# Patient Record
Sex: FEMALE | Race: WHITE | NOT HISPANIC OR LATINO | Employment: OTHER | URBAN - METROPOLITAN AREA
[De-identification: names, ages, dates, MRNs, and addresses within clinical notes are randomized per-mention and may not be internally consistent; named-entity substitution may affect disease eponyms.]

---

## 2017-01-20 ENCOUNTER — TELEPHONE (OUTPATIENT)
Dept: RHEUMATOLOGY | Facility: CLINIC | Age: 82
End: 2017-01-20

## 2017-03-13 ENCOUNTER — TELEPHONE (OUTPATIENT)
Dept: PULMONOLOGY | Facility: CLINIC | Age: 82
End: 2017-03-13

## 2017-03-13 NOTE — TELEPHONE ENCOUNTER
----- Message from Kourtney Ching sent at 3/10/2017 10:17 AM CST -----  Contact: pt  Pt is requesting to speak to the nurse regarding an order that she needs for cpap supplies. Pls call pt back at [phone].

## 2017-03-14 ENCOUNTER — OFFICE VISIT (OUTPATIENT)
Dept: PULMONOLOGY | Facility: CLINIC | Age: 82
End: 2017-03-14
Payer: MEDICARE

## 2017-03-14 VITALS
WEIGHT: 137.13 LBS | HEIGHT: 58 IN | OXYGEN SATURATION: 98 % | HEART RATE: 90 BPM | DIASTOLIC BLOOD PRESSURE: 58 MMHG | SYSTOLIC BLOOD PRESSURE: 150 MMHG | BODY MASS INDEX: 28.78 KG/M2 | RESPIRATION RATE: 18 BRPM

## 2017-03-14 DIAGNOSIS — G47.33 OSA (OBSTRUCTIVE SLEEP APNEA): Primary | ICD-10-CM

## 2017-03-14 PROCEDURE — 99999 PR PBB SHADOW E&M-EST. PATIENT-LVL III: CPT | Mod: PBBFAC,,, | Performed by: NURSE PRACTITIONER

## 2017-03-14 PROCEDURE — 1160F RVW MEDS BY RX/DR IN RCRD: CPT | Mod: S$GLB,,, | Performed by: NURSE PRACTITIONER

## 2017-03-14 PROCEDURE — 99213 OFFICE O/P EST LOW 20 MIN: CPT | Mod: S$GLB,,, | Performed by: NURSE PRACTITIONER

## 2017-03-14 PROCEDURE — 1157F ADVNC CARE PLAN IN RCRD: CPT | Mod: S$GLB,,, | Performed by: NURSE PRACTITIONER

## 2017-03-14 PROCEDURE — 1159F MED LIST DOCD IN RCRD: CPT | Mod: S$GLB,,, | Performed by: NURSE PRACTITIONER

## 2017-03-14 NOTE — Clinical Note
Qualify for new machine. Please verify she is on 10. That is what i am ordering. New supply order too

## 2017-03-14 NOTE — PROGRESS NOTES
"Subjective:      Patient ID: Samantha Balderrama is a 89 y.o. female.    Chief Complaint: Sleep Apnea    HPI Comments: Patient presents to the office today for evaluation of sleep apnea.  Last visit 2015.  She states she is still doing well with CPAP pressure.  She wears nightly.  She states she cannot sleep without her CPAP.  She needs new supplies.  She may also qualify for new CPAP machine.  She benefits from CPAP.      BP (!) 150/58  Pulse 90  Resp 18  Ht 4' 10" (1.473 m)  Wt 62.2 kg (137 lb 2 oz)  SpO2 98%  BMI 28.66 kg/m2  Body mass index is 28.66 kg/(m^2).    Review of Systems   Constitutional: Negative.    HENT: Negative.    Respiratory: Negative.    Cardiovascular: Negative.    Musculoskeletal: Negative.    Gastrointestinal: Negative.    Neurological: Negative.    Psychiatric/Behavioral: Negative.      Objective:      Physical Exam   Constitutional: She is oriented to person, place, and time. She appears well-developed and well-nourished.   HENT:   Head: Normocephalic and atraumatic.   Neck: Normal range of motion. Neck supple.   Cardiovascular: Normal rate and regular rhythm.  Exam reveals no gallop.    No murmur heard.  Pulmonary/Chest: Effort normal and breath sounds normal.   Abdominal: Soft. She exhibits no mass.   Musculoskeletal: Normal range of motion. She exhibits no edema.   Neurological: She is alert and oriented to person, place, and time.   Skin: Skin is warm and dry.   Psychiatric: She has a normal mood and affect.       Assessment:       1. BASILIO (obstructive sleep apnea)        Outpatient Encounter Prescriptions as of 3/14/2017   Medication Sig Dispense Refill    cholecalciferol, vitamin D3, 50,000 unit capsule Take 1 capsule (50,000 Units total) by mouth twice a week. 24 capsule 4    fluticasone (FLONASE) 50 mcg/actuation nasal spray 1 spray by Each Nare route once daily. 1 Bottle 11    FLUZONE HIGH-DOSE 2016-17, PF, 180 mcg/0.5 mL Syrg TO BE ADMINISTERED BY IMMUNIZING PHARMACIST  0 "    montelukast (SINGULAIR) 10 mg tablet Take 1 tablet (10 mg total) by mouth every evening. 30 tablet 6    neomycin-polymyxin-dexamethasone (MAXITROL) 3.5mg/mL-10,000 unit/mL-0.1 % DrpS Use 1 drop in left eye (put in eye and on skin of lid) 4 x a day for 10 days 5 mL 0    salt moisturizing solution no1 (OCEAN ULTRA SALINE MIST) Mist 2 Pump by Nasal route 2 (two) times daily as needed. 90 mL 0    tamoxifen (NOLVADEX) 20 MG Tab Take 1 tablet (20 mg total) by mouth once daily. 90 tablet 3    VIT A/VIT C/VIT E/ZINC/COPPER (PRESERVISION AREDS ORAL) Take by mouth.      [DISCONTINUED] tamoxifen (NOLVADEX) 20 MG Tab TAKE 1 TABLET EVERY DAY 90 tablet 3     Facility-Administered Encounter Medications as of 3/14/2017   Medication Dose Route Frequency Provider Last Rate Last Dose    sodium chloride 0.9% flush 10 mL  10 mL Intravenous PRN Sudeep Beckwith MD   10 mL at 07/23/14 0910    sodium chloride 0.9% flush 10 mL  10 mL Intravenous PRN Sudeep Beckwith MD   10 mL at 07/22/15 1355     Orders Placed This Encounter   Procedures    CPAP/BIPAP SUPPLIES     Order Specific Question:   Type of mask:     Answer:   Nasal     Comments:   or FFM     Order Specific Question:   Headgear?     Answer:   Yes     Order Specific Question:   Tubing?     Answer:   Yes     Order Specific Question:   Humidifier chamber?     Answer:   Yes     Order Specific Question:   Chin strap?     Answer:   Yes     Order Specific Question:   Filters?     Answer:   Yes     Order Specific Question:   Length of need (1-99 months):     Answer:   99    CPAP FOR HOME USE     Order Specific Question:   Type:     Answer:   CPAP     Order Specific Question:   CPAP setting (cmH20):     Answer:   10     Order Specific Question:   Length of need (1-99 months):     Answer:   99     Order Specific Question:   Humidification:     Answer:   Heated     Order Specific Question:   Type of mask:     Answer:   FFM     Comments:   or nasal     Order Specific  Question:   Headgear?     Answer:   Yes     Order Specific Question:   Tubing?     Answer:   Yes     Order Specific Question:   Humidifier chamber?     Answer:   Yes     Order Specific Question:   Chin strap?     Answer:   Yes     Order Specific Question:   Filters?     Answer:   Yes     Plan:   Patient states CPAP over 5 years old.  Order for new CPAP follow-up in 2 months to review compliance and symptoms or call earlier if any problems.  CPAP supply order.

## 2017-05-08 ENCOUNTER — LAB VISIT (OUTPATIENT)
Dept: LAB | Facility: HOSPITAL | Age: 82
End: 2017-05-08
Attending: INTERNAL MEDICINE
Payer: MEDICARE

## 2017-05-08 DIAGNOSIS — Z85.89 HISTORY OF SECONDARY LUNG CANCER: ICD-10-CM

## 2017-05-08 DIAGNOSIS — I70.0 ATHEROSCLEROSIS OF AORTA: ICD-10-CM

## 2017-05-08 DIAGNOSIS — Z13.220 LIPID SCREENING: ICD-10-CM

## 2017-05-08 DIAGNOSIS — Z13.29 THYROID DISORDER SCREENING: ICD-10-CM

## 2017-05-08 DIAGNOSIS — M81.0 OSTEOPOROSIS: ICD-10-CM

## 2017-05-08 DIAGNOSIS — E55.9 HYPOVITAMINOSIS D: Chronic | ICD-10-CM

## 2017-05-08 DIAGNOSIS — Z85.3 HISTORY OF MALIGNANT NEOPLASM OF BOTH BREASTS: ICD-10-CM

## 2017-05-08 LAB
25(OH)D3+25(OH)D2 SERPL-MCNC: 50 NG/ML
ALBUMIN SERPL BCP-MCNC: 3.5 G/DL
ALP SERPL-CCNC: 40 U/L
ALT SERPL W/O P-5'-P-CCNC: 16 U/L
ANION GAP SERPL CALC-SCNC: 8 MMOL/L
AST SERPL-CCNC: 19 U/L
BASOPHILS # BLD AUTO: 0.04 K/UL
BASOPHILS NFR BLD: 0.7 %
BILIRUB SERPL-MCNC: 0.6 MG/DL
BUN SERPL-MCNC: 13 MG/DL
CALCIUM SERPL-MCNC: 9.3 MG/DL
CHLORIDE SERPL-SCNC: 104 MMOL/L
CHOLEST/HDLC SERPL: 3.4 {RATIO}
CO2 SERPL-SCNC: 27 MMOL/L
CREAT SERPL-MCNC: 0.7 MG/DL
DIFFERENTIAL METHOD: ABNORMAL
EOSINOPHIL # BLD AUTO: 0.1 K/UL
EOSINOPHIL NFR BLD: 2.1 %
ERYTHROCYTE [DISTWIDTH] IN BLOOD BY AUTOMATED COUNT: 14.7 %
EST. GFR  (AFRICAN AMERICAN): >60 ML/MIN/1.73 M^2
EST. GFR  (NON AFRICAN AMERICAN): >60 ML/MIN/1.73 M^2
GLUCOSE SERPL-MCNC: 115 MG/DL
HCT VFR BLD AUTO: 39.3 %
HDL/CHOLESTEROL RATIO: 29.3 %
HDLC SERPL-MCNC: 188 MG/DL
HDLC SERPL-MCNC: 55 MG/DL
HGB BLD-MCNC: 12.5 G/DL
LDLC SERPL CALC-MCNC: 109.4 MG/DL
LYMPHOCYTES # BLD AUTO: 1.7 K/UL
LYMPHOCYTES NFR BLD: 28.2 %
MCH RBC QN AUTO: 27.9 PG
MCHC RBC AUTO-ENTMCNC: 31.8 %
MCV RBC AUTO: 88 FL
MONOCYTES # BLD AUTO: 0.6 K/UL
MONOCYTES NFR BLD: 9.2 %
NEUTROPHILS # BLD AUTO: 3.6 K/UL
NEUTROPHILS NFR BLD: 59.6 %
NONHDLC SERPL-MCNC: 133 MG/DL
PLATELET # BLD AUTO: 155 K/UL
PMV BLD AUTO: 10.2 FL
POTASSIUM SERPL-SCNC: 4 MMOL/L
PROT SERPL-MCNC: 7.2 G/DL
RBC # BLD AUTO: 4.48 M/UL
SODIUM SERPL-SCNC: 139 MMOL/L
TRIGL SERPL-MCNC: 118 MG/DL
TSH SERPL DL<=0.005 MIU/L-ACNC: 2.32 UIU/ML
WBC # BLD AUTO: 6.06 K/UL

## 2017-05-08 PROCEDURE — 85025 COMPLETE CBC W/AUTO DIFF WBC: CPT

## 2017-05-08 PROCEDURE — 80053 COMPREHEN METABOLIC PANEL: CPT

## 2017-05-08 PROCEDURE — 80061 LIPID PANEL: CPT

## 2017-05-08 PROCEDURE — 84443 ASSAY THYROID STIM HORMONE: CPT

## 2017-05-08 PROCEDURE — 82306 VITAMIN D 25 HYDROXY: CPT

## 2017-05-08 PROCEDURE — 36415 COLL VENOUS BLD VENIPUNCTURE: CPT | Mod: PO

## 2017-05-15 ENCOUNTER — TELEPHONE (OUTPATIENT)
Dept: PULMONOLOGY | Facility: CLINIC | Age: 82
End: 2017-05-15

## 2017-05-16 ENCOUNTER — OFFICE VISIT (OUTPATIENT)
Dept: HEMATOLOGY/ONCOLOGY | Facility: CLINIC | Age: 82
End: 2017-05-16
Payer: MEDICARE

## 2017-05-16 ENCOUNTER — OFFICE VISIT (OUTPATIENT)
Dept: SLEEP MEDICINE | Facility: CLINIC | Age: 82
End: 2017-05-16
Payer: MEDICARE

## 2017-05-16 ENCOUNTER — OFFICE VISIT (OUTPATIENT)
Dept: RHEUMATOLOGY | Facility: CLINIC | Age: 82
End: 2017-05-16
Payer: MEDICARE

## 2017-05-16 ENCOUNTER — HOSPITAL ENCOUNTER (OUTPATIENT)
Dept: RADIOLOGY | Facility: HOSPITAL | Age: 82
Discharge: HOME | End: 2017-05-16
Attending: NURSE PRACTITIONER
Payer: MEDICARE

## 2017-05-16 VITALS
DIASTOLIC BLOOD PRESSURE: 70 MMHG | RESPIRATION RATE: 18 BRPM | HEIGHT: 58 IN | WEIGHT: 137.38 LBS | TEMPERATURE: 97 F | SYSTOLIC BLOOD PRESSURE: 120 MMHG | HEART RATE: 83 BPM | OXYGEN SATURATION: 99 % | BODY MASS INDEX: 28.84 KG/M2

## 2017-05-16 VITALS
WEIGHT: 139.75 LBS | SYSTOLIC BLOOD PRESSURE: 91 MMHG | HEART RATE: 83 BPM | BODY MASS INDEX: 29.21 KG/M2 | DIASTOLIC BLOOD PRESSURE: 66 MMHG

## 2017-05-16 VITALS
HEIGHT: 58 IN | WEIGHT: 139.75 LBS | SYSTOLIC BLOOD PRESSURE: 104 MMHG | DIASTOLIC BLOOD PRESSURE: 60 MMHG | HEART RATE: 89 BPM | RESPIRATION RATE: 18 BRPM | OXYGEN SATURATION: 97 % | BODY MASS INDEX: 29.33 KG/M2

## 2017-05-16 DIAGNOSIS — M81.0 SENILE OSTEOPOROSIS: ICD-10-CM

## 2017-05-16 DIAGNOSIS — Z85.89 HISTORY OF SECONDARY LUNG CANCER: Primary | ICD-10-CM

## 2017-05-16 DIAGNOSIS — Z85.3 HISTORY OF MALIGNANT NEOPLASM OF BOTH BREASTS: ICD-10-CM

## 2017-05-16 DIAGNOSIS — E55.9 HYPOVITAMINOSIS D: Chronic | ICD-10-CM

## 2017-05-16 DIAGNOSIS — M15.9 PRIMARY OSTEOARTHRITIS INVOLVING MULTIPLE JOINTS: Primary | ICD-10-CM

## 2017-05-16 DIAGNOSIS — Z85.89 HISTORY OF SECONDARY LUNG CANCER: ICD-10-CM

## 2017-05-16 DIAGNOSIS — G47.33 OSA (OBSTRUCTIVE SLEEP APNEA): Primary | ICD-10-CM

## 2017-05-16 PROCEDURE — 1157F ADVNC CARE PLAN IN RCRD: CPT | Mod: S$GLB,,, | Performed by: NURSE PRACTITIONER

## 2017-05-16 PROCEDURE — 99999 PR PBB SHADOW E&M-EST. PATIENT-LVL III: CPT | Mod: PBBFAC,,, | Performed by: NURSE PRACTITIONER

## 2017-05-16 PROCEDURE — 71020 XR CHEST PA AND LATERAL: CPT | Mod: 26,,, | Performed by: RADIOLOGY

## 2017-05-16 PROCEDURE — 1159F MED LIST DOCD IN RCRD: CPT | Mod: S$GLB,,, | Performed by: NURSE PRACTITIONER

## 2017-05-16 PROCEDURE — 1125F AMNT PAIN NOTED PAIN PRSNT: CPT | Mod: S$GLB,,, | Performed by: PHYSICIAN ASSISTANT

## 2017-05-16 PROCEDURE — 1160F RVW MEDS BY RX/DR IN RCRD: CPT | Mod: S$GLB,,, | Performed by: PHYSICIAN ASSISTANT

## 2017-05-16 PROCEDURE — 99214 OFFICE O/P EST MOD 30 MIN: CPT | Mod: S$GLB,,, | Performed by: PHYSICIAN ASSISTANT

## 2017-05-16 PROCEDURE — 99213 OFFICE O/P EST LOW 20 MIN: CPT | Mod: S$GLB,,, | Performed by: NURSE PRACTITIONER

## 2017-05-16 PROCEDURE — 99999 PR PBB SHADOW E&M-EST. PATIENT-LVL III: CPT | Mod: PBBFAC,,, | Performed by: PHYSICIAN ASSISTANT

## 2017-05-16 PROCEDURE — 71020 XR CHEST PA AND LATERAL: CPT | Mod: TC,PO

## 2017-05-16 PROCEDURE — 1160F RVW MEDS BY RX/DR IN RCRD: CPT | Mod: S$GLB,,, | Performed by: NURSE PRACTITIONER

## 2017-05-16 PROCEDURE — 99499 UNLISTED E&M SERVICE: CPT | Mod: S$GLB,,, | Performed by: PHYSICIAN ASSISTANT

## 2017-05-16 PROCEDURE — 99999 PR PBB SHADOW E&M-EST. PATIENT-LVL IV: CPT | Mod: PBBFAC,,, | Performed by: NURSE PRACTITIONER

## 2017-05-16 PROCEDURE — 1159F MED LIST DOCD IN RCRD: CPT | Mod: S$GLB,,, | Performed by: PHYSICIAN ASSISTANT

## 2017-05-16 PROCEDURE — 99499 UNLISTED E&M SERVICE: CPT | Mod: S$GLB,,, | Performed by: NURSE PRACTITIONER

## 2017-05-16 PROCEDURE — 1126F AMNT PAIN NOTED NONE PRSNT: CPT | Mod: S$GLB,,, | Performed by: NURSE PRACTITIONER

## 2017-05-16 NOTE — PROGRESS NOTES
Subjective:       Patient ID: Samantha Balderrama is a 89 y.o. female.    Chief Complaint: Osteoarthritis and Osteoporosis    HPI Comments: Ms Samantha Lauren is a sweet 88 y/o laldy here for routine follow up today. She is seen for osteoporosis and osteoarthritis.  She has been on Reclast and has had a total of 6 treatments (last tx 7/2015).  Last July 2016 she was put on holiday after her dexa was reveiwed showing stable BMD.  Total hip -1.6, neck -2.5, spine -2.6.  Reclast Holiday was recommended and next dexa to be done in 1-2 years.  Patient has a history of Vit D deficiency and was on 50,000units twice weekly.  Her levels have been normal so we decreased to once weekly vit D.  No recent falls or fractures.  She is very active.      She also has osteoarthritis mainly in her hands with degenerative changes at her pip and dip joints.  She says they don't hurt her as much as they just dont work like they used to.  She does not take anything for her arthritis.  No complaints with knee pain or shoulder pain.  She has a h/o breast ca and is still on tamoxifen.  Overall she is doing very well and has no issues today.    Osteoarthritis   Associated symptoms include arthralgias. Pertinent negatives include no abdominal pain, chest pain, chills, coughing, fatigue, fever, headaches, joint swelling, myalgias, nausea, numbness, rash, sore throat, vomiting or weakness.     Review of Systems   Constitutional: Negative for chills, fatigue and fever.   HENT: Negative for mouth sores, rhinorrhea and sore throat.    Eyes: Negative for pain and redness.   Respiratory: Negative for cough and shortness of breath.    Cardiovascular: Negative for chest pain.   Gastrointestinal: Negative for abdominal pain, constipation, diarrhea, nausea and vomiting.   Genitourinary: Negative for dysuria and hematuria.   Musculoskeletal: Positive for arthralgias. Negative for joint swelling and myalgias.   Skin: Negative for rash.   Neurological: Negative  for weakness, numbness and headaches.   Psychiatric/Behavioral: The patient is not nervous/anxious.          Objective:     BP 91/66  Pulse 83  Wt 63.4 kg (139 lb 12.4 oz)  BMI 29.21 kg/m2     Physical Exam   Constitutional: She is oriented to person, place, and time and well-developed, well-nourished, and in no distress.   HENT:   Head: Normocephalic and atraumatic.   Eyes: Pupils are equal, round, and reactive to light. Right eye exhibits no discharge.   Neck: Normal range of motion.   Cardiovascular: Normal rate, regular rhythm and normal heart sounds.  Exam reveals no friction rub.    Pulmonary/Chest: Effort normal and breath sounds normal. No respiratory distress.   Abdominal: Soft. She exhibits no distension. There is no tenderness.   Lymphadenopathy:     She has no cervical adenopathy.   Neurological: She is alert and oriented to person, place, and time.   Skin: No rash noted. No erythema.     Psychiatric: Mood normal.   Musculoskeletal: Normal range of motion. She exhibits no edema or deformity.   Significant degenerative changes to naseem hands pip and dip joints, heberdens and bouchards nodes, no acute inflammation or synovitis notes  Naseem knees with good rom, no effusions, minimal crepitus         Recent Results (from the past 504 hour(s))   Vitamin D    Collection Time: 05/08/17 11:18 AM   Result Value Ref Range    Vit D, 25-Hydroxy 50 30 - 96 ng/mL   TSH    Collection Time: 05/08/17 11:18 AM   Result Value Ref Range    TSH 2.317 0.400 - 4.000 uIU/mL   CBC auto differential    Collection Time: 05/08/17 11:18 AM   Result Value Ref Range    WBC 6.06 3.90 - 12.70 K/uL    RBC 4.48 4.00 - 5.40 M/uL    Hemoglobin 12.5 12.0 - 16.0 g/dL    Hematocrit 39.3 37.0 - 48.5 %    MCV 88 82 - 98 fL    MCH 27.9 27.0 - 31.0 pg    MCHC 31.8 (L) 32.0 - 36.0 %    RDW 14.7 (H) 11.5 - 14.5 %    Platelets 155 150 - 350 K/uL    MPV 10.2 9.2 - 12.9 fL    Gran # 3.6 1.8 - 7.7 K/uL    Lymph # 1.7 1.0 - 4.8 K/uL    Mono # 0.6 0.3 -  1.0 K/uL    Eos # 0.1 0.0 - 0.5 K/uL    Baso # 0.04 0.00 - 0.20 K/uL    Gran% 59.6 38.0 - 73.0 %    Lymph% 28.2 18.0 - 48.0 %    Mono% 9.2 4.0 - 15.0 %    Eosinophil% 2.1 0.0 - 8.0 %    Basophil% 0.7 0.0 - 1.9 %    Differential Method Automated    CMP    Collection Time: 05/08/17 11:18 AM   Result Value Ref Range    Sodium 139 136 - 145 mmol/L    Potassium 4.0 3.5 - 5.1 mmol/L    Chloride 104 95 - 110 mmol/L    CO2 27 23 - 29 mmol/L    Glucose 115 (H) 70 - 110 mg/dL    BUN, Bld 13 8 - 23 mg/dL    Creatinine 0.7 0.5 - 1.4 mg/dL    Calcium 9.3 8.7 - 10.5 mg/dL    Total Protein 7.2 6.0 - 8.4 g/dL    Albumin 3.5 3.5 - 5.2 g/dL    Total Bilirubin 0.6 0.1 - 1.0 mg/dL    Alkaline Phosphatase 40 (L) 55 - 135 U/L    AST 19 10 - 40 U/L    ALT 16 10 - 44 U/L    Anion Gap 8 8 - 16 mmol/L    eGFR if African American >60.0 >60 mL/min/1.73 m^2    eGFR if non African American >60.0 >60 mL/min/1.73 m^2   Lipid panel    Collection Time: 05/08/17 11:18 AM   Result Value Ref Range    Cholesterol 188 120 - 199 mg/dL    Triglycerides 118 30 - 150 mg/dL    HDL 55 40 - 75 mg/dL    LDL Cholesterol 109.4 63.0 - 159.0 mg/dL    HDL/Chol Ratio 29.3 20.0 - 50.0 %    Total Cholesterol/HDL Ratio 3.4 2.0 - 5.0    Non-HDL Cholesterol 133 mg/dL       DEXA 7/2016: osteoporosis stable bmd, Total hip -1.6, femur neck -2.5, spine -2.6     Assessment:       1. Primary osteoarthritis involving multiple joints    2. Senile osteoporosis    3. Hypovitaminosis D        1.  OA garett hands with notable OA changes, no significant pain, not taking any medication for this  2.  Osteoporosis--on reclast holiday after 6 treatments, last dexa 7/2016 osteoporosis stable bmd, Total hip -1.6, femur neck -2.5, spine -2.6  3. Vit D deficiency: now normalized, taking 50,000units Vit d weekly      Plan:       Continue reclast holiday, next dexa July 2018  cont Vit D once weekly 50,000units, calcium in diet  Call us if any flares of pain, with any questions or concers  rec  tumeric 1000mg/day     Will follow her once yearly, bring back when time for next bone density scan but sooner if needed

## 2017-05-16 NOTE — PROGRESS NOTES
ONCOLOGIST: JACQUELINE Toney MD    CC: Breast cancer followup.    HPI: 89-year-old female with a history of bilateral breast cancer with metastatic disease to the lung status post lung resection, bilateral mastectomies, and radiation therapy/ chemo in 1985. She has been on long term Tamoxifen with no adverse side effects. She has been followed every 6 mons with clinical exams, labs and chest x-rays.     Pt has dips in her platelet counts intermittently dating back to Sept 2012. Saw Dr. Toney 9/25/15- no further recommendations- just follow cbc. Dr. Toney would like for her to remain on Tamoxifen indefinitely. Had gyn exam with Dr. Coronado on 10/7/15.     Pt comes in for 6 mon chest x-ray and chest wall exam. Lab review.     Answers for HPI/ROS submitted by the patient on 5/16/2017   appetite change : No  unexpected weight change: No  visual disturbance: Yes  cough: No  shortness of breath: No  chest pain: Yes  abdominal pain: No  diarrhea: No  frequency: No  back pain: No  rash: No  headaches: No  adenopathy: No  nervous/ anxious: No      ROS: Denies sob, chest pain, weakness or fatigue, dizziness, nausea, wt loss or gain. No sore throat. No sinus congestion, dry cough or sneezing. No fever or night sweats. No itchy eyes. No lela aches or pains out of the ordinary. No change in bowel or bladder function or character. No chest wall lumps. No appetite changes. No vaginal discharge or bleeding. No lower extremity edema or pain. Has hearing aids and hears very well. No gingival bleeding, nose bleeds or unusual bruising. Not walking- admits needs to get back in to it. States feels good.  Balance of ROS is negative otherwise.     PAST MEDICAL HISTORY: Arthritis, obstructive sleep apnea, bilateral breast cancer with metastatic disease to lung s/p resection, osteoporosis, thrombocytopenia.     PAST SURGICAL HISTORY: Bilateral mastectomies. Lung resection.     FAMILY HISTORY: Sisters with hypertension, father and mother with  heart disease, sister with asthma, sister with breast cancer, aunt with colon cancer. Niece in mid 30's breast cancer. There is also a family history of osteoporosis.     SOCIAL HISTORY: She is single, never , no children. Does not smoke and reports alcohol usage of 1 to 2 per week, caffeine 4 to 5 per day. Denies any regular exercise program. 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 Appears younger than her stated age. Well-nourished and developed.  HEAD: Atraumatic and normocephalic. Pupils equal round reactive to light. Conjunctiva is non-icteric. Oral mucosa pink and moist with no posterior erythema. Nasal passages patent and pale/boggy in appearance.   NECK: Soft, supple, symmetric. No thyromegaly. No bruit garett carotids.   LYMPHATICS: There is no cervical, supraclavicular, infraclavicular or axillary adenopathy.  CV: Regular rate rhythm with no murmurs or gallops.  CHEST: Clear to auscultation. Unlabored respiratory effort.  CHEST WALL: Breasts are surgically absent. There is no visible rash, lump, erythema, dimpling, or architectural distortion over bilateral chest walls. No palpable abnormality appreciated over the chest walls.  ABDOMEN: Soft, nontender, no hepatosplenomegaly.  SKIN: Warm and dry to touch. Skin turgor within normal limits.    EXTREMITIES: There is no clubbing, cyanosis, or edema. No evidence of lymphedema in the upper extremities.  PSYCH: Affect is appropriate and pleasant.    CHEST X-ray today -results pending. Cbc wnl- chemistries - mild elevation in glucose- pt not fasting, lipids - wnl, tsh- wnl, vit d normal- dexa up to date.    Last colonoscopy 2/2005 - had polyp excised. Was recommended by Dr. Pierre to have 5 year follow-up. This was ordered and Gastro decided due to her age she would be ok not to have the scope.     ASSESSMENT:  1. Bilateral breast cancer with metastatic disease to the lung. Status post  bilateral mastectomies and lung resection. Status post chemotherapy and chest wall radiation and on long-term tamoxifen.  2. Sleep apnea. Using CPAP without difficulty  3. Health maintenance. Colonoscopy canceled by gastro - decided not needed  4. Hearing loss corrected with bilateral hearing aides  5. Thrombocytopenia intermittent and relatively stable- labs wnl today  6. CXR - pending    PLAN:  1. RTC 6 mons with PA and lateral chest x-ray, CBC, and chem 20  2. B12 sublinguinal daily.   3. Patient was asked to perform monthly chest wall examinations and was instructed on what to look for during the exam.  4. Patient encouraged to get back into her walking and she agrees.      Clarice Lombardi, RN, MSN, NP-C

## 2017-05-16 NOTE — PROGRESS NOTES
"Subjective:      Patient ID: Samantha Balderrama is a 89 y.o. female.    Chief Complaint: Sleep Apnea    HPI Comments: Patient presents to office for evaluation of CPAP. This is replacement. She is doing well with new machine. She is wearing her old machine when she is at her sister's house which is quite often.   She benefits from use and wears nightly.   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    Review of Systems   Constitutional: Negative.    HENT: Negative.    Respiratory: Negative.    Cardiovascular: Negative.    Musculoskeletal: Negative.    Gastrointestinal: Negative.    Neurological: Negative.    Psychiatric/Behavioral: Negative.      Objective:      Physical Exam   Constitutional: She is oriented to person, place, and time. She appears well-developed and well-nourished.   HENT:   Head: Normocephalic and atraumatic.   Mouth/Throat: Oropharynx is clear and moist.   Neck: Normal range of motion. Neck supple.   Cardiovascular: Normal rate and regular rhythm.  Exam reveals no gallop.    No murmur heard.  Pulmonary/Chest: Effort normal and breath sounds normal.   Abdominal: Soft.   Musculoskeletal: Normal range of motion. She exhibits no edema.   Neurological: She is alert and oriented to person, place, and time.   Skin: Skin is warm and dry.   Psychiatric: She has a normal mood and affect.     Personal Diagnostic Review  CPAP download shows patient wears on average 6 " hrs and 54 minutes. Greater than 4 hrs 60 % of the time. AHI 2.6  She also wears another CPAP          Assessment:       1. BASILIO (obstructive sleep apnea)        Outpatient Encounter Prescriptions as of 5/16/2017   Medication Sig Dispense Refill    cholecalciferol, vitamin D3, 50,000 unit capsule Take 1 capsule (50,000 Units total) by mouth twice a week. 24 capsule 4    montelukast (SINGULAIR) 10 mg tablet Take 1 tablet (10 mg total) by mouth every evening. 30 tablet 6    neomycin-polymyxin-dexamethasone (MAXITROL) 3.5mg/mL-10,000 unit/mL-0.1 % DrpS Use 1 drop in left eye (put in eye and on skin of lid) 4 x a day for 10 days 5 mL 0    salt moisturizing solution no1 (OCEAN ULTRA SALINE MIST) Mist 2 Pump by Nasal route 2 (two) times daily as needed. 90 mL 0    tamoxifen (NOLVADEX) 20 MG Tab Take 1 tablet (20 mg total) by mouth once daily. 90 tablet 3    VIT A/VIT C/VIT E/ZINC/COPPER (PRESERVISION AREDS ORAL) Take by mouth.      [DISCONTINUED] FLUZONE HIGH-DOSE 2016-17, PF, 180 mcg/0.5 mL Syrg TO BE ADMINISTERED BY IMMUNIZING PHARMACIST  0     Facility-Administered Encounter Medications as of 5/16/2017   Medication Dose Route Frequency Provider Last Rate Last Dose    sodium chloride 0.9% flush 10 mL  10 mL Intravenous PRN Sudeep Beckwith MD   10 mL at 07/23/14 0910    sodium chloride 0.9% flush 10 mL  10 mL Intravenous PRN Sudeep Beckwith MD   10 mL at 07/22/15 1355     No orders of the defined types were placed in this encounter.    Plan:      Discussed with patient to wear new machine nightly and followup before 90 day set up day to review compliance.

## 2017-05-16 NOTE — MR AVS SNAPSHOT
[facility] Rheumatology  [address] [place] [address]  Phone: [phone]  Fax: [phone]                  Samantha Balderrama   2017 10:00 AM   Office Visit    Description:  Female : 10/28/1927   Provider:  Danya Downey PA-C   Department[facility]           Reason for Visit     Osteoarthritis     Osteoporosis           Diagnoses this Visit        Comments    Primary osteoarthritis involving multiple joints    -  Primary     Senile osteoporosis                To Do List           Future Appointments        Provider Department Dept Phone    2017 11:00 AM Clarice Lombardi NP [facility] - Hemotology Oncology [phone]    2017 8:40 AM Elizabeth Lejeune, NP [facility] Sleep Clinic [phone]    2017 10:30 AM ANASTACIA Amaral MD [facility] Ophthalmology [phone]    3/14/2018 10:00 AM Elizabeth Lejeune, NP [facility] Sleep [facility] [phone]      Goals (5 Years of Data)     None      Follow-Up and Disposition     Return in about 1 year (around 2018) for dexa due july.      [facility]     [facility] Nurse Care Line -  Assistance  Unless otherwise directed by your provider, please contact [facility], our nurse care line that is available for  assistance.     Registered nurses in the [facility] provide: appointment scheduling, clinical advisement, health education, and other advisory services.  Call: [phone] (toll free)               Medications           Message regarding Medications     Verify the changes and/or additions to your medication regime listed below are the same as discussed with your clinician today.  If any of these changes or additions are incorrect, please notify your healthcare provider.             Verify that the below list of medications is an accurate representation of the medications you are currently taking.  If none reported, the list may be blank. If incorrect, please contact your healthcare provider.  Carry this list with you in case of emergency.           Current Medications     cholecalciferol, vitamin D3, 50,000 unit capsule Take 1 capsule (50,000 Units total) by mouth twice a week.    montelukast (SINGULAIR) 10 mg tablet Take 1 tablet (10 mg total) by mouth every evening.    neomycin-polymyxin-dexamethasone (MAXITROL) 3.5mg/mL-10,000 unit/mL-0.1 % DrpS Use 1 drop in left eye (put in eye and on skin of lid) 4 x a day for 10 days    salt moisturizing solution no1 (OCEAN ULTRA SALINE MIST) Mist 2 Pump by Nasal route 2 (two) times daily as needed.    tamoxifen (NOLVADEX) 20 MG Tab Take 1 tablet (20 mg total) by mouth once daily.    VIT A/VIT C/VIT E/ZINC/COPPER (PRESERVISION AREDS ORAL) Take by mouth.           Clinical Reference Information           Your Vitals Were     BP Pulse Weight BMI       91/66 83 63.4 kg (139 lb 12.4 oz) 29.21 kg/m2       Blood Pressure          Most Recent Value    BP  91/66      Allergies as of 5/16/2017     No Known Allergies      Immunizations Administered on Date of Encounter - 5/16/2017     None      Instructions    Tumeric (Curmcumin) 1000mg/day (in supplement area at the store)    Will repeat bone density next July, will just schedule you back then, call if needed sooner     Cont vit D 50,000U weekly        Language Assistance Services     ATTENTION: Language assistance services are available, free of charge. Please call [phone].      ATENCIÓN: Si habla judith, tiene a ascencio disposición servicios gratuitos de asistencia lingüística. Llame al [phone].     [facility] Ý: N?u b?n nói Ti?ng Vi?t, có các d?ch v? h? tr? ngôn ng? mi?n phí dành cho b?n. G?i s? [phone].         [facility] complies with applicable Federal civil rights laws and does not discriminate on the basis of race, color, national origin, age, disability, or sex.

## 2017-06-06 ENCOUNTER — OFFICE VISIT (OUTPATIENT)
Dept: SLEEP MEDICINE | Facility: CLINIC | Age: 82
End: 2017-06-06
Payer: MEDICARE

## 2017-06-06 VITALS
HEIGHT: 58 IN | OXYGEN SATURATION: 99 % | SYSTOLIC BLOOD PRESSURE: 100 MMHG | WEIGHT: 136 LBS | DIASTOLIC BLOOD PRESSURE: 64 MMHG | RESPIRATION RATE: 18 BRPM | HEART RATE: 74 BPM | BODY MASS INDEX: 28.55 KG/M2

## 2017-06-06 DIAGNOSIS — G47.33 OSA (OBSTRUCTIVE SLEEP APNEA): Primary | ICD-10-CM

## 2017-06-06 PROCEDURE — 1159F MED LIST DOCD IN RCRD: CPT | Mod: S$GLB,,, | Performed by: NURSE PRACTITIONER

## 2017-06-06 PROCEDURE — 99999 PR PBB SHADOW E&M-EST. PATIENT-LVL III: CPT | Mod: PBBFAC,,, | Performed by: NURSE PRACTITIONER

## 2017-06-06 PROCEDURE — 99213 OFFICE O/P EST LOW 20 MIN: CPT | Mod: S$GLB,,, | Performed by: NURSE PRACTITIONER

## 2017-06-06 NOTE — PROGRESS NOTES
"Subjective:      Patient ID: Samantha Balderrama is a 89 y.o. female.    Chief Complaint: Sleep Apnea    Presents to office for review of CPAP therapy. Recent replacement.Patient states improved symptoms with use of CPAP. Sleeping more soundly. Waking up feeling more refreshed. Improved daytime sleepiness. Patient states she is benefiting from use of the CPAP.   Patient Active Problem List:     Senile osteoporosis     Macular degeneration, age related, nonexudative     Serous detachment of retinal pigment epithelium     Pseudophakia     BASILIO on CPAP     Hypovitaminosis D     History of secondary lung cancer     History of malignant neoplasm of both breasts     Atherosclerosis of aorta     History of skin cancer     Obesity (BMI 30.0-34.9)     Primary osteoarthritis involving multiple joints              /64   Pulse 74   Resp 18   Ht 4' 10" (1.473 m)   Wt 61.7 kg (136 lb 0.4 oz)   SpO2 99%   BMI 28.43 kg/m²   Body mass index is 28.43 kg/m².    Review of Systems   Constitutional: Negative.    HENT: Negative.    Respiratory: Negative.    Cardiovascular: Negative.    Musculoskeletal: Negative.    Gastrointestinal: Negative.    Neurological: Negative.    Psychiatric/Behavioral: Negative.      Objective:      Physical Exam   Constitutional: She is oriented to person, place, and time. She appears well-developed and well-nourished.   HENT:   Head: Normocephalic and atraumatic.   Mouth/Throat: Oropharynx is clear and moist.   Neck: Normal range of motion. Neck supple.   Cardiovascular: Normal rate and regular rhythm.  Exam reveals no gallop.    No murmur heard.  Pulmonary/Chest: Effort normal and breath sounds normal.   Abdominal: Soft.   Musculoskeletal: Normal range of motion. She exhibits no edema.   Neurological: She is alert and oriented to person, place, and time.   Skin: Skin is warm and dry.   Psychiatric: She has a normal mood and affect.     Personal Diagnostic Review  CPAP download shows patient wears on " average 6 hrs and 46 minutes. Greater than 4 hrs 73.3 % of the time. AHI 3.6    Assessment:       1. BASILIO (obstructive sleep apnea)        Outpatient Encounter Prescriptions as of 6/6/2017   Medication Sig Dispense Refill    cholecalciferol, vitamin D3, 50,000 unit capsule Take 1 capsule (50,000 Units total) by mouth twice a week. 24 capsule 4    montelukast (SINGULAIR) 10 mg tablet Take 1 tablet (10 mg total) by mouth every evening. 30 tablet 6    neomycin-polymyxin-dexamethasone (MAXITROL) 3.5mg/mL-10,000 unit/mL-0.1 % DrpS Use 1 drop in left eye (put in eye and on skin of lid) 4 x a day for 10 days 5 mL 0    salt moisturizing solution no1 (OCEAN ULTRA SALINE MIST) Mist 2 Pump by Nasal route 2 (two) times daily as needed. 90 mL 0    tamoxifen (NOLVADEX) 20 MG Tab Take 1 tablet (20 mg total) by mouth once daily. 90 tablet 3    VIT A/VIT C/VIT E/ZINC/COPPER (PRESERVISION AREDS ORAL) Take by mouth.       Facility-Administered Encounter Medications as of 6/6/2017   Medication Dose Route Frequency Provider Last Rate Last Dose    sodium chloride 0.9% flush 10 mL  10 mL Intravenous PRN Sudeep Beckwith MD   10 mL at 07/23/14 0910    sodium chloride 0.9% flush 10 mL  10 mL Intravenous PRN Sudeep Beckwith MD   10 mL at 07/22/15 1355     No orders of the defined types were placed in this encounter.    Plan:      Doing well on PAP settings. Patient is compliant. Follow up in 12 months with PAP data download or call earlier if any problems.

## 2017-08-09 ENCOUNTER — OFFICE VISIT (OUTPATIENT)
Dept: INTERNAL MEDICINE | Facility: CLINIC | Age: 82
End: 2017-08-09
Payer: MEDICARE

## 2017-08-09 VITALS
HEART RATE: 88 BPM | OXYGEN SATURATION: 97 % | BODY MASS INDEX: 27.58 KG/M2 | SYSTOLIC BLOOD PRESSURE: 122 MMHG | HEIGHT: 58 IN | DIASTOLIC BLOOD PRESSURE: 70 MMHG | WEIGHT: 131.38 LBS

## 2017-08-09 DIAGNOSIS — Z85.89 HISTORY OF SECONDARY LUNG CANCER: ICD-10-CM

## 2017-08-09 DIAGNOSIS — M15.9 PRIMARY OSTEOARTHRITIS INVOLVING MULTIPLE JOINTS: ICD-10-CM

## 2017-08-09 DIAGNOSIS — Z85.3 HISTORY OF MALIGNANT NEOPLASM OF BOTH BREASTS: ICD-10-CM

## 2017-08-09 DIAGNOSIS — G47.33 OSA ON CPAP: Chronic | ICD-10-CM

## 2017-08-09 DIAGNOSIS — Z00.00 ENCOUNTER FOR PREVENTIVE HEALTH EXAMINATION: Primary | ICD-10-CM

## 2017-08-09 DIAGNOSIS — E55.9 HYPOVITAMINOSIS D: Chronic | ICD-10-CM

## 2017-08-09 DIAGNOSIS — M81.0 SENILE OSTEOPOROSIS: ICD-10-CM

## 2017-08-09 DIAGNOSIS — Z85.828 HISTORY OF SKIN CANCER: ICD-10-CM

## 2017-08-09 DIAGNOSIS — H35.3190 MACULAR DEGENERATION, AGE RELATED, NONEXUDATIVE: ICD-10-CM

## 2017-08-09 DIAGNOSIS — I70.0 ATHEROSCLEROSIS OF AORTA: ICD-10-CM

## 2017-08-09 PROCEDURE — 99999 PR PBB SHADOW E&M-EST. PATIENT-LVL IV: CPT | Mod: PBBFAC,,, | Performed by: PHYSICIAN ASSISTANT

## 2017-08-09 PROCEDURE — G0439 PPPS, SUBSEQ VISIT: HCPCS | Mod: S$GLB,,, | Performed by: PHYSICIAN ASSISTANT

## 2017-08-09 PROCEDURE — 99499 UNLISTED E&M SERVICE: CPT | Mod: S$GLB,,, | Performed by: PHYSICIAN ASSISTANT

## 2017-08-09 NOTE — PATIENT INSTRUCTIONS
Counseling and Referral of Other Preventative  (Italic type indicates deductible and co-insurance are waived)    Patient Name: Samantha Balderrama  Today's Date: 8/9/2017      SERVICE LIMITATIONS RECOMMENDATION    Vaccines    · Pneumococcal (once after 65)    · Influenza (annually)    · Hepatitis B (if medium/high risk)    · Prevnar 13      Hepatitis B medium/high risk factors:       - End-stage renal disease       - Hemophiliacs who received Factor VII or         IX concentrates       - Clients of institutions for the mentally             retarded       - Persons who live in the same house as          a HepB carrier       - Homosexual men       - Illicit injectable drug abusers     Pneumococcal: Report got at [facility]. Follow PCP     Influenza: Will get in the fall     Hepatitis B: N/A     Prevnar 13: Report done at [facility].  follow PCP    Mammogram (biennial age 50-74)  Annually (age 40 or over)  N/A    Pap (up to age 70 and after 70 if unknown history or abnormal study last 10 years)    N/A. Follow Dr. Coronado     The USPSTF recommends against screening for cervical cancer in women older than age 65 years who have had adequate prior screening and are not otherwise at high risk for cervical cancer.      Colorectal cancer screening (to age 75)    · Fecal occult blood test (annual)  · Flexible sigmoidoscopy (5y)  · Screening colonoscopy (10y)  · Barium enema   N/A. GI reports no longer necessary. Continue follow PCP.    Diabetes self-management training (no USPSTF recommendations)  Requires referral by treating physician for patient with diabetes or renal disease. 10 hours of initial DSMT sessions of no less than 30 minutes each in a continuous 12-month period. 2 hours of follow-up DSMT in subsequent years.  N/A    Bone mass measurements (age 65 & older, biennial)  Requires diagnosis related to osteoporosis or estrogen deficiency. Biennial benefit unless patient has history of long-term glucocorticoid  Last done 7/2016, recommend  to repeat every 2  years    Glaucoma screening (no USPSTF recommendation)  Diabetes mellitus, family history   , age 50 or over    American, age 65 or over  Done this year, repeat every year. Follow Dr. Amaral    Medical nutrition therapy for diabetes or renal disease (no recommended schedule)  Requires referral by treating physician for patient with diabetes or renal disease or kidney transplant within the past 3 years.  Can be provided in same year as diabetes self-management training (DSMT), and CMS recommends medical nutrition therapy take place after DSMT. Up to 3 hours for initial year and 2 hours in subsequent years.  N/A    Cardiovascular screening blood tests (every 5 years)  · Fasting lipid panel  Order as a panel if possible  Done this year, repeat every year    Diabetes screening tests (at least every 3 years, Medicare covers annually or at 6-month intervals for prediabetic patients)  · Fasting blood sugar (FBS) or glucose tolerance test (GTT)  Patient must be diagnosed with one of the following:       - Hypertension       - Dyslipidemia       - Obesity (BMI 30kg/m2)       - Previous elevated impaired FBS or GTT       ... or any two of the following:       - Overweight (BMI 25 but <30)       - Family history of diabetes       - Age 65 or older       - History of gestational diabetes or birth of baby weighing more than 9 pounds Follow PCP    HIV screening (annually for increased risk patients)  · HIV-1 and HIV-2 by EIA, or RON, rapid antibody test or oral mucosa transudate  Patients must be at increased risk for HIV infection per USPSTF guidelines or pregnant. Tests covered annually for patient at increased risk or as requested by the patient. Pregnant patients may receive up to 3 tests during pregnancy.  Risks discussed, screening is not recommended    Smoking cessation counseling (up to 8 sessions per year)  Patients must be asymptomatic of tobacco-related conditions to receive  as a preventative service.  Non-smoker    Subsequent annual wellness visit  At least 12 months since last AWV  Return in one year     The following information is provided to all patients.  This information is to help you find resources for any of the problems found today that may be affecting your health:                Living healthy guide: www.[facility].[place].[facility]      Understanding Diabetes: www.diabetes.org      Eating healthy: www.cdc.gov/healthyweight      CDC home safety checklist: www.cdc.gov/steadi/patient.html      Agency on Aging: www.goea.[place].[facility]      Alcoholics anonymous (AA): www.aa.org      Physical Activity: www.isabel.nih.gov/yo2zfwt      Tobacco use: www.quitwithusla.org

## 2017-08-09 NOTE — PROGRESS NOTES
"Samantha Balderrama presented for a  Medicare AWV and comprehensive Health Risk Assessment today. The following components were reviewed and updated:    · Medical history  · Family History  · Social history  · Allergies and Current Medications  · Health Risk Assessment  · Health Maintenance  · Care Team     ** See Completed Assessments for Annual Wellness Visit within the encounter summary.**       The following assessments were completed:  · Living Situation  · CAGE  · Depression Screening  · Timed Get Up and Go  · Whisper Test  · Cognitive Function Screening  · Nutrition Screening  · ADL Screening  · PAQ Screening    Vitals:    08/09/17 0949   BP: 122/70   BP Location: Left arm   Patient Position: Sitting   BP Method: Manual   Pulse: 88   SpO2: 97%   Weight: 59.6 kg (131 lb 6.3 oz)   Height: 4' 10" (1.473 m)     Body mass index is 27.46 kg/m².  Physical Exam   Constitutional: She appears well-developed and well-nourished. No distress.   HENT:   Head: Normocephalic and atraumatic.   Bilateral hearing aids   Eyes: Conjunctivae and EOM are normal. Right eye exhibits no discharge. Left eye exhibits no discharge.   Neck: Normal range of motion. Neck supple. No tracheal deviation present. No thyromegaly present.   Cardiovascular: Normal rate, regular rhythm and normal heart sounds.    No murmur heard.  Pulses:       Radial pulses are 2+ on the right side, and 2+ on the left side.   Pulmonary/Chest: Effort normal and breath sounds normal. No respiratory distress. She has no wheezes.   Abdominal: Soft. Bowel sounds are normal. She exhibits no distension. There is no tenderness. There is no rebound and no guarding.   Musculoskeletal: Normal range of motion. She exhibits no edema or tenderness.   Bilateral hand stiffness   Neurological: No cranial nerve deficit.   Grasp equal both hands, No tremors, or muscle fasciculations noted. Toes downgoing, Sensation intact to soft touch. Gait: No ataxia.    Skin: Skin is warm and dry. No " rash noted. She is not diaphoretic. No erythema. No pallor.   Psychiatric: She has a normal mood and affect. Her behavior is normal. Judgment and thought content normal.   Nursing note and vitals reviewed.        Diagnoses and health risks identified today and associated recommendations/orders:    1. Encounter for preventive health examination  Completed today    2. Senile osteoporosis  Dexa 7/15/14, 7/18/16.  On Vit D. Continue current treatment plan as previously prescribed with your PCP and rheumatology.    3. BASILOI on CPAP  Stable. Polysomnogram 6/1/05. Wears CPAP nightly.  Continue current treatment plan as previously prescribed with pulmonology. Appt 6/5/18    4. History of malignant neoplasm of both breasts  History of bilateral breast cancer with metastatic disease to the lung status post lung resection, bilateral mastectomies,   and radiation therapy/ chemo in 1985. She has been on long term Tamoxifen with no adverse side effects. On Tamoxifen indefinitely.  Stable. Continue current treatment plan as previously prescribed with your PCP and oncology. Appt 11/15/17    5. History of secondary lung cancer  History of bilateral breast cancer with metastatic disease to the lung status post lung resection, bilateral mastectomies,   and radiation therapy/ chemo in 1985. She has been on long term Tamoxifen with no adverse side effects. On Tamoxifen indefinitely.  Stable. Continue current treatment plan as previously prescribed with your PCP and oncologist, Dr. Toney.    6. History of skin cancer  Follows Dr. SAQIB Armenta. BCC left shoulder and SSC left lower leg excision.  Stable. Continue follo with your dermatologist as necessary.    7. Hypovitaminosis D  Stable. Taking Vid D.  Continue current treatment plan as previously prescribed with your PCP    8. Atherosclerosis of aorta  Chest x-ray 3/13/12- Documented atherosclerosis and ectasia of the aorta. Pt denies chest pains, sob or palpations.   Discussed need to  continue control BP, lipids, glucose, diet/ exercise  to avoid further arterial wall buildup.    9. Primary osteoarthritis involving multiple joints  Low back and bilateral hand stiffness.  No specific pain. Continue current treatment plan as previously prescribed with rheumatology.    10. Macular degeneration, age related, nonexudative  Stable. Continue current treatment plan as previously prescribed with your ophthalmologist, Dr Amaral. Appt 12/5/17      Provided Samantha with a 5-10 year written screening schedule and personal prevention plan. Recommendations were developed using the USPSTF age appropriate recommendations. Education, counseling, and referrals were provided as needed. After Visit Summary printed and given to patient which includes a list of additional screenings\tests needed.  Reports following Dr. Toney as PCP. Continue to follow as scheduled or sooner if necessary.    Jose Roberto Colvin PA-C

## 2017-08-23 NOTE — ADDENDUM NOTE
Encounter addended by: Kourtney James MA on: 8/23/2017  8:33 AM<BR>    Actions taken: Document created

## 2017-11-05 DIAGNOSIS — C50.011 MALIGNANT NEOPLASM OF NIPPLE OF RIGHT BREAST IN FEMALE: ICD-10-CM

## 2017-11-05 RX ORDER — TAMOXIFEN CITRATE 20 MG/1
20 TABLET ORAL DAILY
Qty: 90 TABLET | Refills: 1 | Status: SHIPPED | OUTPATIENT
Start: 2017-11-05 | End: 2019-02-21 | Stop reason: SDUPTHER

## 2017-11-14 DIAGNOSIS — Z85.89 HISTORY OF SECONDARY LUNG CANCER: ICD-10-CM

## 2017-11-14 DIAGNOSIS — Z85.3 HISTORY OF MALIGNANT NEOPLASM OF BOTH BREASTS: Primary | ICD-10-CM

## 2017-11-15 ENCOUNTER — OFFICE VISIT (OUTPATIENT)
Dept: SURGERY | Facility: CLINIC | Age: 82
End: 2017-11-15
Payer: MEDICARE

## 2017-11-15 ENCOUNTER — HOSPITAL ENCOUNTER (OUTPATIENT)
Dept: RADIOLOGY | Facility: HOSPITAL | Age: 82
Discharge: HOME | End: 2017-11-15
Attending: NURSE PRACTITIONER
Payer: MEDICARE

## 2017-11-15 VITALS
HEART RATE: 84 BPM | RESPIRATION RATE: 16 BRPM | DIASTOLIC BLOOD PRESSURE: 70 MMHG | SYSTOLIC BLOOD PRESSURE: 102 MMHG | OXYGEN SATURATION: 97 % | BODY MASS INDEX: 30.3 KG/M2 | HEIGHT: 57 IN | WEIGHT: 140.44 LBS

## 2017-11-15 DIAGNOSIS — Z85.89 HISTORY OF SECONDARY LUNG CANCER: ICD-10-CM

## 2017-11-15 DIAGNOSIS — Z85.3 HISTORY OF MALIGNANT NEOPLASM OF BOTH BREASTS: Primary | ICD-10-CM

## 2017-11-15 DIAGNOSIS — Z85.3 HISTORY OF MALIGNANT NEOPLASM OF BOTH BREASTS: ICD-10-CM

## 2017-11-15 DIAGNOSIS — M81.0 SENILE OSTEOPOROSIS: ICD-10-CM

## 2017-11-15 DIAGNOSIS — Z71.9 HEALTH EDUCATION/COUNSELING: ICD-10-CM

## 2017-11-15 DIAGNOSIS — M81.8 OTHER OSTEOPOROSIS WITHOUT CURRENT PATHOLOGICAL FRACTURE: ICD-10-CM

## 2017-11-15 PROCEDURE — 71020 XR CHEST PA AND LATERAL: CPT | Mod: TC,PO

## 2017-11-15 PROCEDURE — 99499 UNLISTED E&M SERVICE: CPT | Mod: S$GLB,,, | Performed by: NURSE PRACTITIONER

## 2017-11-15 PROCEDURE — 99214 OFFICE O/P EST MOD 30 MIN: CPT | Mod: S$GLB,,, | Performed by: NURSE PRACTITIONER

## 2017-11-15 PROCEDURE — 99999 PR PBB SHADOW E&M-EST. PATIENT-LVL III: CPT | Mod: PBBFAC,,, | Performed by: NURSE PRACTITIONER

## 2017-11-15 PROCEDURE — 71020 XR CHEST PA AND LATERAL: CPT | Mod: 26,,, | Performed by: RADIOLOGY

## 2017-11-15 RX ORDER — ATORVASTATIN CALCIUM 20 MG/1
20 TABLET, FILM COATED ORAL DAILY
Qty: 30 TABLET | Refills: 11 | Status: SHIPPED | OUTPATIENT
Start: 2017-11-15 | End: 2019-07-06 | Stop reason: SDUPTHER

## 2017-12-18 ENCOUNTER — OFFICE VISIT (OUTPATIENT)
Dept: OPHTHALMOLOGY | Facility: CLINIC | Age: 82
End: 2017-12-18
Payer: MEDICARE

## 2017-12-18 DIAGNOSIS — H35.3131 EARLY STAGE DRY AGE-RELATED MACULAR DEGENERATION OF BOTH EYES: Primary | ICD-10-CM

## 2017-12-18 PROCEDURE — 99999 PR PBB SHADOW E&M-EST. PATIENT-LVL II: CPT | Mod: PBBFAC,,, | Performed by: OPHTHALMOLOGY

## 2017-12-18 PROCEDURE — 92014 COMPRE OPH EXAM EST PT 1/>: CPT | Mod: S$GLB,,, | Performed by: OPHTHALMOLOGY

## 2017-12-18 PROCEDURE — 92250 FUNDUS PHOTOGRAPHY W/I&R: CPT | Mod: S$GLB,,, | Performed by: OPHTHALMOLOGY

## 2017-12-18 NOTE — PROGRESS NOTES
"    ===============================  12/19/2017   Samantha Balderrama,   89 y.o. female   Last visit [facility]: :12/2/2016   Last visit eye dept. Visit date not found  [facility]:  Corrected distance visual acuity was 20/20 in the right eye and 20/40 in the left eye.  Tonometry     Tonometry (Applanation, 10:01 AM)       Right Left    Pressure 16 14              Wearing Rx     Wearing Rx       Sphere Cylinder Axis Add    Right -1.00 +1.00 108 +3.00    Left [place] +0.50 070 +3.00    Age:  1yr    Type:  Bifocal               Not recorded        Chief Complaint   Patient presents with    Macular Degeneration     1 year RTC w/MOCT and Optos. No ocular complaints, currently taking eye vitamins, does her amsler test weekly. No ocular pain or irritation. Doing well with her glasses, decline a new prescription today. Not using any drops at this time.         HPI     Macular Degeneration    Additional comments: 1 year RTC w/MOCT and Optos. No ocular complaints,   currently taking eye vitamins, does her amsler test weekly. No ocular pain   or irritation. Doing well with her glasses, decline a new prescription   today. Not using any drops at this time.            Comments       1. H/o episodic vision loss  2. PCIOL OU  3. Dry Eyes  4. Dry AMD OU /FIBROTIC STRAND OS  5. Blepharitis  6. Refractive Error       Last edited by Last Schneider on 12/18/2017 10:05 AM. 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       ===========================    "

## 2018-03-12 ENCOUNTER — OFFICE VISIT (OUTPATIENT)
Dept: SURGERY | Facility: CLINIC | Age: 83
End: 2018-03-12
Payer: MEDICARE

## 2018-03-12 ENCOUNTER — HOSPITAL ENCOUNTER (OUTPATIENT)
Dept: RADIOLOGY | Facility: HOSPITAL | Age: 83
Discharge: HOME | End: 2018-03-12
Attending: NURSE PRACTITIONER
Payer: MEDICARE

## 2018-03-12 VITALS
DIASTOLIC BLOOD PRESSURE: 78 MMHG | SYSTOLIC BLOOD PRESSURE: 122 MMHG | HEIGHT: 57 IN | BODY MASS INDEX: 30.39 KG/M2 | WEIGHT: 140.88 LBS | HEART RATE: 99 BPM | RESPIRATION RATE: 18 BRPM

## 2018-03-12 DIAGNOSIS — Z85.3 HISTORY OF MALIGNANT NEOPLASM OF BOTH BREASTS: ICD-10-CM

## 2018-03-12 DIAGNOSIS — R05.9 COUGH: ICD-10-CM

## 2018-03-12 DIAGNOSIS — Z85.89 HISTORY OF SECONDARY LUNG CANCER: Primary | ICD-10-CM

## 2018-03-12 DIAGNOSIS — H35.3131 EARLY STAGE DRY AGE-RELATED MACULAR DEGENERATION OF BOTH EYES: ICD-10-CM

## 2018-03-12 DIAGNOSIS — R04.2 HEMOPTYSIS: Primary | ICD-10-CM

## 2018-03-12 DIAGNOSIS — Z85.89 HISTORY OF SECONDARY LUNG CANCER: ICD-10-CM

## 2018-03-12 DIAGNOSIS — R04.2 BLOOD IN SPUTUM: ICD-10-CM

## 2018-03-12 DIAGNOSIS — R04.2 HEMOPTYSIS: ICD-10-CM

## 2018-03-12 PROCEDURE — 71046 X-RAY EXAM CHEST 2 VIEWS: CPT | Mod: TC

## 2018-03-12 PROCEDURE — 99213 OFFICE O/P EST LOW 20 MIN: CPT | Mod: S$GLB,,, | Performed by: NURSE PRACTITIONER

## 2018-03-12 PROCEDURE — 99999 PR PBB SHADOW E&M-EST. PATIENT-LVL IV: CPT | Mod: PBBFAC,,, | Performed by: NURSE PRACTITIONER

## 2018-03-12 PROCEDURE — 71046 X-RAY EXAM CHEST 2 VIEWS: CPT | Mod: 26,,, | Performed by: RADIOLOGY

## 2018-03-12 PROCEDURE — 99499 UNLISTED E&M SERVICE: CPT | Mod: S$GLB,,, | Performed by: NURSE PRACTITIONER

## 2018-03-12 NOTE — PROGRESS NOTES
ONCOLOGIST: JACQUELINE Toney MD    CC: Patient reports an episode of coughing up blood this morning.  Further questioning revealed the patient actually tasted blood in the back of her throat and then coughed and noted blood in her sputum.  She denies any coughing, short windedness, has had slight  nasal drip over the past few days.  Slight stuffy nose.  She has been using DX VapoRub at the base of her nose and took Sudafed yesterday.  Denies any congestion.  No fever.  She is not using her antihistamines or Flonase.    States when she tasted the blood she coughed up sputum with blood in it she can't remember if it was bright red or dark red.  She denies any more episodes.  States this really frightened her.      Chest x-ray today was stable with no change.      89-year-old female with a history of bilateral breast cancer with metastatic disease to the lung status post lung resection, bilateral mastectomies, and radiation therapy/ chemo in . She has been on long term Tamoxifen with no adverse side effects. She has been followed every 6 mons with clinical exams, labs and chest x-rays.     Pt has dips in her platelet counts intermittently dating back to 2012. Saw Dr. Toney 9/25/15- no further recommendations- just follow cbc. Dr. Toney would like for her to remain on Tamoxifen indefinitely. Had gyn exam with Dr. Coronado on 10/7/15.     Pt is seen every 6 mon chest x-ray and chest wall exam. Lab review.     ROS: Denies sob, chest pain, weakness or fatigue, dizziness, nausea, wt loss or gain. No sore throat. No sinus congestion, dry cough or sneezing. No fever or night sweats. No itchy eyes. No lela aches or pains out of the ordinary. No change in bowel or bladder function or character. No chest wall lumps. No appetite changes. No vaginal discharge or bleeding. No lower extremity edema or pain. Has hearing aids and hears very well. No gingival bleeding, nose bleeds or unusual bruising. Not walking-  admits needs to get back in to it. States feels good.  Balance of ROS is negative otherwise.     PAST MEDICAL HISTORY: Arthritis, obstructive sleep apnea, bilateral breast cancer with metastatic disease to lung s/p resection, osteoporosis, thrombocytopenia.     PAST SURGICAL HISTORY: Bilateral mastectomies. Lung resection.     FAMILY HISTORY: Sisters with hypertension, father and mother with heart disease, sister with asthma, sister with breast cancer, aunt with colon cancer. Niece in mid 30's breast cancer. There is also a family history of osteoporosis.     SOCIAL HISTORY: She is single, never , no children. Does not smoke and reports alcohol usage of 1 to 2 per week, caffeine 4 to 5 per day. Denies any regular exercise program. Works or volunteers several times a month with [employer]     PE:   GENERAL: Awake, alert, adult female. Appears younger than her stated age. Well-nourished and developed.  HEAD: Atraumatic and normocephalic. Pupils equal round reactive to light. Conjunctiva is non-icteric. Oral mucosa pink and moist with no posterior erythema. Nasal passages patent and pale/boggy in appearance.  No visible blood and nasal passages are in the back of her throat.  Bilateral tympanic membranes noted with no redness or swelling.  NECK: Soft, supple, symmetric. No thyromegaly. No bruit garett carotids.   LYMPHATICS: There is no cervical, supraclavicular, infraclavicular or axillary adenopathy.  CV: Regular rate rhythm with no murmurs or gallops.  CHEST: Clear to auscultation. Unlabored respiratory effort.  CHEST WALL: Breasts are surgically absent. There is no visible rash, lump, erythema, dimpling, or architectural distortion over bilateral chest walls. No palpable abnormality appreciated over the chest walls.  ABDOMEN: Soft, nontender, no hepatosplenomegaly.  SKIN: Warm and dry to touch. Skin turgor within normal limits.    EXTREMITIES: There is no clubbing, cyanosis, or edema. No evidence of  lymphedema in the upper extremities.  PSYCH: Affect is appropriate and pleasant.    CHEST X-ray today - stable findings.    Last colonoscopy 2/2005 - had polyp excised. Was recommended by Dr. Pierre to have 5 year follow-up. This was ordered and Gastro decided due to her age she would be ok not to have the scope.     ASSESSMENT:  1. Bilateral breast cancer with metastatic disease to the lung. Status post bilateral mastectomies and lung resection. Status post chemotherapy and chest wall radiation and on long-term tamoxifen.  2. Sleep apnea. Using CPAP without difficulty  3. Health maintenance. Colonoscopy canceled by gastro - decided not needed  4. Hearing loss corrected with bilateral hearing aides  5. Thrombocytopenia intermittent and relatively stable-   6. CXR - stable  7.  One episode of blood in sputum after patient tasted and in her mouth and then coughed it up.  No more episodes.  Has used Sudafed.  Denies any congestion but has had slight postnasal drip.  No blood in stool, gum bleeding or bruising.     PLAN:  1.  Zyrtec 1 tab by mouth at night for ALLERGY symptoms.  Flonase 1 spray per nostril at night.  Ocean Spray 2 sprays per nostril in the morning.  Encouraged patient to drink more water for hydration.  She is to call if she has any more episodes of blood in her postnasal drainage.  If this occurs again we will obtain a platelet count and possible CT of chest and sinuses.  Patient verbalizes understanding and relief.  2. RTC in May 2018 with PA and lateral chest x-ray, CBC, tsh, lipids and chem 20  3. B12 sublinguinal daily.   4. Patient was asked to perform monthly chest wall examinations and was instructed on what to look for during the exam.  5. Patient encouraged to get back into her walking and she agrees.      Clarice Lombardi, RN, MSN, NP-C

## 2018-03-12 NOTE — PATIENT INSTRUCTIONS
Take zyrtec at night   Flonase at night one spray per nostril  Ocean spray in the morning     Drink more water

## 2018-05-08 ENCOUNTER — PATIENT OUTREACH (OUTPATIENT)
Dept: ADMINISTRATIVE | Facility: HOSPITAL | Age: 83
End: 2018-05-08

## 2018-05-17 ENCOUNTER — PES CALL (OUTPATIENT)
Dept: ADMINISTRATIVE | Facility: CLINIC | Age: 83
End: 2018-05-17

## 2018-05-29 ENCOUNTER — OFFICE VISIT (OUTPATIENT)
Dept: OPHTHALMOLOGY | Facility: CLINIC | Age: 83
End: 2018-05-29
Payer: MEDICARE

## 2018-05-29 DIAGNOSIS — H35.3132 INTERMEDIATE STAGE NONEXUDATIVE AGE-RELATED MACULAR DEGENERATION OF BOTH EYES: Primary | ICD-10-CM

## 2018-05-29 DIAGNOSIS — H04.129 DRYNESS, EYE: ICD-10-CM

## 2018-05-29 PROCEDURE — 92134 CPTRZ OPH DX IMG PST SGM RTA: CPT | Mod: S$GLB,,, | Performed by: OPHTHALMOLOGY

## 2018-05-29 PROCEDURE — 99999 PR PBB SHADOW E&M-EST. PATIENT-LVL II: CPT | Mod: PBBFAC,,, | Performed by: OPHTHALMOLOGY

## 2018-05-29 PROCEDURE — 92014 COMPRE OPH EXAM EST PT 1/>: CPT | Mod: S$GLB,,, | Performed by: OPHTHALMOLOGY

## 2018-05-29 NOTE — PROGRESS NOTES
===============================  05/29/2018   Samantha Balderrama,   89 y.o. female   Last visit [facility]: :12/18/2017   Last visit eye dept. 12/18/2017  [facility]:  Corrected distance visual acuity was 20/25 in the right eye and 20/50 in the left eye.  Tonometry     Tonometry (Applanation, 8:50 AM)       Right Left    Pressure 16 18              Wearing Rx     Wearing Rx       Sphere Cylinder Axis Add    Right -1.00 +1.00 108 +3.00    Left [place] +0.50 070 +3.00    Type:  Bifocal              Manifest Refraction     Manifest Refraction       Sphere Cylinder [place] Dist [facility]    Right -1.25 +1.25 108 20/25    Left [place] +0.50 070 ni              Chief Complaint   Patient presents with    Macular Degeneration     pt states that she is having trouble seeing small print        HPI     Macular Degeneration    Additional comments: pt states that she is having trouble seeing small   print           Comments   1. H/o episodic vision loss  2. PCIOL OU  3. Dry Eyes  4. Dry AMD OU /FIBROTIC STRAND OS  5. Blepharitis  6. Refractive Error       Last edited by Marleny Cedillo on 5/29/2018  8:41 AM. (History)          ________________  5/29/2018  Problem List Items Addressed This Visit        Eye/Vision problems    Intermediate stage nonexudative age-related macular degeneration of both eyes - Primary  OD soft drusen, no srf  OS rped, drusen, no srf  Both stable    Here today in uc visit with complaint of worse OS vision.  No change in macula, no bleeding.    Relevant Orders    Posterior Segment OCT Retina-Both eyes (Completed)       Other    Dryness, eye  + SPK OS>OD today  Recommend at's tid or more  Likely cause of symptoms    rtc as scheduled.          .       ===========================

## 2018-06-05 ENCOUNTER — HOSPITAL ENCOUNTER (OUTPATIENT)
Dept: RADIOLOGY | Facility: HOSPITAL | Age: 83
Discharge: HOME | End: 2018-06-05
Attending: NURSE PRACTITIONER
Payer: MEDICARE

## 2018-06-05 ENCOUNTER — OFFICE VISIT (OUTPATIENT)
Dept: SLEEP MEDICINE | Facility: CLINIC | Age: 83
End: 2018-06-05
Payer: MEDICARE

## 2018-06-05 VITALS
OXYGEN SATURATION: 96 % | WEIGHT: 137.13 LBS | RESPIRATION RATE: 16 BRPM | DIASTOLIC BLOOD PRESSURE: 70 MMHG | SYSTOLIC BLOOD PRESSURE: 110 MMHG | BODY MASS INDEX: 29.58 KG/M2 | HEART RATE: 86 BPM | HEIGHT: 57 IN

## 2018-06-05 DIAGNOSIS — Z85.89 HISTORY OF SECONDARY LUNG CANCER: ICD-10-CM

## 2018-06-05 DIAGNOSIS — G47.33 OSA (OBSTRUCTIVE SLEEP APNEA): Primary | ICD-10-CM

## 2018-06-05 DIAGNOSIS — Z71.9 HEALTH EDUCATION/COUNSELING: ICD-10-CM

## 2018-06-05 DIAGNOSIS — M81.0 SENILE OSTEOPOROSIS: ICD-10-CM

## 2018-06-05 DIAGNOSIS — Z85.3 HISTORY OF MALIGNANT NEOPLASM OF BOTH BREASTS: ICD-10-CM

## 2018-06-05 PROCEDURE — 99999 PR PBB SHADOW E&M-EST. PATIENT-LVL III: CPT | Mod: PBBFAC,,, | Performed by: NURSE PRACTITIONER

## 2018-06-05 PROCEDURE — 71046 X-RAY EXAM CHEST 2 VIEWS: CPT | Mod: TC,FY,PO

## 2018-06-05 PROCEDURE — 99213 OFFICE O/P EST LOW 20 MIN: CPT | Mod: S$GLB,,, | Performed by: NURSE PRACTITIONER

## 2018-06-05 PROCEDURE — 71046 X-RAY EXAM CHEST 2 VIEWS: CPT | Mod: 26,,, | Performed by: RADIOLOGY

## 2018-06-05 NOTE — PROGRESS NOTES
"Subjective:      Patient ID: Samantha Balderrama is a 89 y.o. female.    Chief Complaint: Sleep Apnea    Presents to office for review of CPAP therapy. Recent replacement.Patient states improved symptoms with use of CPAP. Sleeping more soundly. Waking up feeling more refreshed. Improved daytime sleepiness. Patient states she is benefiting from use of the CPAP.   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    Review of Systems   Constitutional: Negative.    HENT: Negative.    Respiratory: Negative.    Cardiovascular: Negative.    Musculoskeletal: Negative.    Gastrointestinal: Negative.    Neurological: Negative.    Psychiatric/Behavioral: Negative.      Objective:      Physical Exam   Constitutional: She is oriented to person, place, and time. She appears well-developed and well-nourished.   HENT:   Head: Normocephalic and atraumatic.   Mouth/Throat: Oropharynx is clear and moist.   Neck: Normal range of motion. Neck supple.   Cardiovascular: Normal rate and regular rhythm.  Exam reveals no gallop.    No murmur heard.  Pulmonary/Chest: Effort normal and breath sounds normal.   Abdominal: Soft. She exhibits no mass. There is no tenderness.   Musculoskeletal: Normal range of motion. She exhibits no edema.   Neurological: She is alert and oriented to person, place, and time.   Skin: Skin is warm and dry.   Psychiatric: She has a normal mood and affect.     Personal Diagnostic " Review    Compliance Summary  5/6/2018 - 6/4/2018 (30 days)  Days with Device Usage 23 days  Days without Device Usage 7 days  Percent Days with Device Usage 76.7%  Cumulative Usage 7 days 4 hrs. 1 mins. 51 secs.  Maximum Usage (1 Day) 8 hrs. 58 mins. 50 secs.  Average Usage (All Days) 5 hrs. 44 mins. 3 secs.  Average Usage (Days Used) 7 hrs. 28 mins. 46 secs.  Minimum Usage (1 Day) 5 hrs. 33 mins. 7 secs.  Percent of Days with Usage >= 4 Hours 76.7%  Percent of Days with Usage < 4 Hours 23.3%  Date Range  Total Blower Time 10 days 12 hrs. 21 mins. 33 secs.  CPAP Summary  Average Time in Large Leak Per Day 4 mins. 34 secs.  Average AHI 5.2  CPAP 10.0 cmH2O    Assessment:       1. BASILIO (obstructive sleep apnea)        Outpatient Encounter Prescriptions as of 6/5/2018   Medication Sig Dispense Refill    atorvastatin (LIPITOR) 20 MG tablet Take 1 tablet (20 mg total) by mouth once daily. 30 tablet 11    cholecalciferol, vitamin D3, 50,000 unit capsule Take 1 capsule (50,000 Units total) by mouth twice a week. 24 capsule 4    montelukast (SINGULAIR) 10 mg tablet Take 1 tablet (10 mg total) by mouth every evening. 30 tablet 6    salt moisturizing solution no1 (OCEAN ULTRA SALINE MIST) Mist 2 Pump by Nasal route 2 (two) times daily as needed. 90 mL 0    tamoxifen (NOLVADEX) 20 MG Tab TAKE 1 TABLET (20 MG TOTAL) BY MOUTH ONCE DAILY. 90 tablet 1    VIT A/VIT C/VIT E/ZINC/COPPER (PRESERVISION AREDS ORAL) Take by mouth.       Facility-Administered Encounter Medications as of 6/5/2018   Medication Dose Route Frequency Provider Last Rate Last Dose    sodium chloride 0.9% flush 10 mL  10 mL Intravenous PRN Sudeep Beckwith MD   10 mL at 07/23/14 0910    sodium chloride 0.9% flush 10 mL  10 mL Intravenous PRN Sudeep Beckwith MD   10 mL at 07/22/15 1355     Orders Placed This Encounter   Procedures    CPAP/BIPAP SUPPLIES     Order Specific Question:   Type of mask:     Answer:   Nasal     Order Specific Question:    Headgear?     Answer:   Yes     Order Specific Question:   Tubing?     Answer:   Yes     Order Specific Question:   Humidifier chamber?     Answer:   Yes     Order Specific Question:   Chin strap?     Answer:   Yes     Order Specific Question:   Filters?     Answer:   Yes     Order Specific Question:   Length of need (1-99 months):     Answer:   99     Plan:   Doing well on PAP settings. Patient is compliant. Follow up in 12 months with PAP data download or call earlier if any problems.

## 2018-06-19 NOTE — PROGRESS NOTES
ONCOLOGIST: JACQUELINE Toney MD    CC: 89-year-old female with a history of bilateral breast cancer with metastatic disease to the lung status post lung resection, bilateral mastectomies, and radiation therapy/ chemo in 1985. She has been on long term Tamoxifen with no adverse side effects. She has been followed every 6 mons with clinical exams, labs and chest x-rays.     Pt has dips in her platelet counts intermittently dating back to Sept 2012. Saw Dr. Toney 9/25/15- no further recommendations- just follow cbc. Dr. Toney would like for her to remain on Tamoxifen indefinitely. Had gyn exam with Dr. Coronado on 10/7/15.     Pt is seen every 6 mon chest x-ray and chest wall exam. Lab review.     ROS: Denies sob, chest pain, weakness or fatigue, dizziness, nausea, wt loss or gain. No sore throat. No sinus congestion, dry cough or sneezing. No fever or night sweats. No itchy eyes. No lela aches or pains out of the ordinary. No change in bowel or bladder function or character. No chest wall lumps. No appetite changes. No vaginal discharge or bleeding. No lower extremity edema or pain. Has hearing aids and hears very well. No gingival bleeding, nose bleeds or unusual bruising. Not walking- admits needs to get back in to it. States feels good.  Balance of ROS is negative otherwise.     PAST MEDICAL HISTORY: Arthritis, obstructive sleep apnea, bilateral breast cancer with metastatic disease to lung s/p resection, osteoporosis, thrombocytopenia.     PAST SURGICAL HISTORY: Bilateral mastectomies. Lung resection.     FAMILY HISTORY: Sisters with hypertension, father and mother with heart disease, sister with asthma, sister with breast cancer, aunt with colon cancer. Niece in mid 30's breast cancer. There is also a family history of osteoporosis.           SOCIAL HISTORY: She is single, never , no children. Does not smoke and reports alcohol usage of 1 to 2 per week, caffeine 4 to 5 per day. Denies any regular exercise  program. Works or volunteers several times a month with [employer]     PE:   GENERAL: Awake, alert, adult female. Appears younger than her stated age. Well-nourished and developed.  HEAD: Atraumatic and normocephalic. Pupils equal round reactive to light. Conjunctiva is non-icteric. Oral mucosa pink and moist with no posterior erythema. Nasal passages patent and pale/boggy in appearance.  No visible blood and nasal passages are in the back of her throat.  Bilateral tympanic membranes noted with no redness or swelling.  NECK: Soft, supple, symmetric. No thyromegaly. No bruit garett carotids.   LYMPHATICS: There is no cervical, supraclavicular, infraclavicular or axillary adenopathy.  CV: Regular rate rhythm with no murmurs or gallops.  CHEST: Clear to auscultation. Unlabored respiratory effort.  CHEST WALL: Breasts are surgically absent. There is no visible rash, lump, erythema, dimpling, or architectural distortion over bilateral chest walls. No palpable abnormality appreciated over the chest walls.  ABDOMEN: Soft, nontender, no hepatosplenomegaly.  SKIN: Warm and dry to touch. Skin turgor within normal limits.    EXTREMITIES: There is no clubbing, cyanosis, or edema. No evidence of lymphedema in the upper extremities.  PSYCH: Affect is appropriate and pleasant.    CHEST X-ray 6/5/18  EXAMINATION:  XR CHEST PA AND LATERAL    CLINICAL HISTORY:  6 mon f/u metastatic disease; Personal history of malignant neoplasm of breast    TECHNIQUE:  PA and lateral views of the chest were performed.    COMPARISON:  03/12/2018    FINDINGS:  Postsurgical changes of bilateral mastectomies with axillary lymph node dissection are noted.  There is blunting of the right lateral costophrenic angle which is unchanged.  There is soft tissue thickening in the paratracheal regions with a nodular density again seen in the medial right upper lobe, unchanged.  Cardiomediastinal silhouette and osseous structures are stable in appearance.    Impression       Stable chest radiograph without evidence of acute disease         Results for JENNY CAMERON (MRN [id number]) as of 6/19/2018 07:28   Ref. Range 5/8/2017 11:18 11/15/2017 09:40 6/5/2018 10:12   WBC Latest Ref Range: 3.90 - 12.70 K/uL 6.06 4.74 5.53   RBC Latest Ref Range: 4.00 - 5.40 M/uL 4.48 4.53 4.50   Hemoglobin Latest Ref Range: 12.0 - 16.0 g/dL 12.5 12.5 12.7   Hematocrit Latest Ref Range: 37.0 - 48.5 % 39.3 39.3 40.4   MCV Latest Ref Range: 82 - 98 fL 88 87 90   MCH Latest Ref Range: 27.0 - 31.0 pg 27.9 27.6 28.2   MCHC Latest Ref Range: 32.0 - 36.0 g/dL 31.8 (L) 31.8 (L) 31.4 (L)   RDW Latest Ref Range: 11.5 - 14.5 % 14.7 (H) 14.2 14.4   Platelets Latest Ref Range: 150 - 350 K/uL 155 158 157   MPV Latest Ref Range: 9.2 - 12.9 fL 10.2 10.1 10.8   Gran% Latest Ref Range: 38.0 - 73.0 % 59.6 57.7 61.2   Gran # (ANC) Latest Ref Range: 1.8 - 7.7 K/uL 3.6 2.7 3.4   Immature Granulocytes Latest Ref Range: 0.0 - 0.5 %  0.6 (H) 0.5   Immature Grans (Abs) Latest Ref Range: 0.00 - 0.04 K/uL  0.03 0.03   Lymph% Latest Ref Range: 18.0 - 48.0 % 28.2 25.9 24.8   Lymph # Latest Ref Range: 1.0 - 4.8 K/uL 1.7 1.2 1.4   Mono% Latest Ref Range: 4.0 - 15.0 % 9.2 12.9 11.2   Mono # Latest Ref Range: 0.3 - 1.0 K/uL 0.6 0.6 0.6   Eosinophil% Latest Ref Range: 0.0 - 8.0 % 2.1 2.1 1.4   Eos # Latest Ref Range: 0.0 - 0.5 K/uL 0.1 0.1 0.1   Basophil% Latest Ref Range: 0.0 - 1.9 % 0.7 0.8 0.9   Baso # Latest Ref Range: 0.00 - 0.20 K/uL 0.04 0.04 0.05   nRBC Latest Ref Range: 0 /100 WBC  0 0       Last colonoscopy 2/2005 - had polyp excised. Was recommended by Dr. Pierre to have 5 year follow-up. This was ordered and Gastro decided due to her age she would be ok not to have the scope.     ASSESSMENT:  1. Bilateral breast cancer with metastatic disease to the lung. Status post bilateral mastectomies and lung resection. Status post chemotherapy and chest wall radiation and on long-term tamoxifen.  2. Sleep apnea. Using  CPAP without difficulty  3. Health maintenance. Colonoscopy canceled by gastro - decided not needed  4. Hearing loss corrected with bilateral hearing aides  5. Thrombocytopenia intermittent and relatively stable-   6. CXR - stable  7.  Need for PPV23 vacination- pt had the first vaccine- 13- did not have the second. Due for tetanus- pt will check with pharmacy for this and is up to date with zoster vaccine.     PLAN:  1.  RTC in Dec 2018 with PA and lateral chest x-ray, CBC, lipids and chem 20  2. B12 sublinguinal daily.   3. Patient was asked to perform monthly chest wall examinations and was instructed on what to look for during the exam.  4. Patient encouraged to get back into her walking and she agrees.      Clarice Lombardi, RN, MSN, NP-C          Answers for HPI/ROS submitted by the patient on 6/20/2018   appetite change : No  unexpected weight change: No  visual disturbance: No  cough: No  shortness of breath: No  chest pain: No  abdominal pain: No  diarrhea: No  frequency: No  back pain: No  rash: No  headaches: No  adenopathy: No  nervous/ anxious: No

## 2018-06-20 ENCOUNTER — OFFICE VISIT (OUTPATIENT)
Dept: HEMATOLOGY/ONCOLOGY | Facility: CLINIC | Age: 83
End: 2018-06-20
Payer: MEDICARE

## 2018-06-20 VITALS
WEIGHT: 136.88 LBS | HEIGHT: 58 IN | BODY MASS INDEX: 28.73 KG/M2 | SYSTOLIC BLOOD PRESSURE: 120 MMHG | HEART RATE: 97 BPM | OXYGEN SATURATION: 96 % | DIASTOLIC BLOOD PRESSURE: 62 MMHG

## 2018-06-20 DIAGNOSIS — Z86.39 HISTORY OF HYPERLIPIDEMIA: ICD-10-CM

## 2018-06-20 DIAGNOSIS — Z85.3 HISTORY OF MALIGNANT NEOPLASM OF BOTH BREASTS: Primary | ICD-10-CM

## 2018-06-20 DIAGNOSIS — Z85.89 HISTORY OF SECONDARY LUNG CANCER: ICD-10-CM

## 2018-06-20 DIAGNOSIS — E66.8 OTHER OBESITY: ICD-10-CM

## 2018-06-20 DIAGNOSIS — Z13.220 LIPID SCREENING: ICD-10-CM

## 2018-06-20 DIAGNOSIS — Z23 NEED FOR TETANUS BOOSTER: ICD-10-CM

## 2018-06-20 DIAGNOSIS — Z23 NEED FOR STREPTOCOCCUS PNEUMONIAE VACCINATION: ICD-10-CM

## 2018-06-20 PROCEDURE — 90732 PPSV23 VACC 2 YRS+ SUBQ/IM: CPT | Mod: S$GLB,,, | Performed by: INTERNAL MEDICINE

## 2018-06-20 PROCEDURE — 99999 PR PBB SHADOW E&M-EST. PATIENT-LVL III: CPT | Mod: PBBFAC,,, | Performed by: NURSE PRACTITIONER

## 2018-06-20 PROCEDURE — 99499 UNLISTED E&M SERVICE: CPT | Mod: S$GLB,,, | Performed by: NURSE PRACTITIONER

## 2018-06-20 PROCEDURE — 99213 OFFICE O/P EST LOW 20 MIN: CPT | Mod: S$GLB,,, | Performed by: NURSE PRACTITIONER

## 2018-06-20 PROCEDURE — G0009 ADMIN PNEUMOCOCCAL VACCINE: HCPCS | Mod: S$GLB,,, | Performed by: INTERNAL MEDICINE

## 2018-07-06 NOTE — PROGRESS NOTES
Subjective:       Patient ID: Samantha Balderrama is a 89 y.o. female.    Chief Complaint: Osteoarthritis    Ms Samantha Lauren is a sweet 89 y/o laldy here for routine follow up today. She is seen for osteoporosis and osteoarthritis.  She has been on Reclast and has had a total of 6 treatments (last tx 7/2015). July 2016 she was put on holiday after her dexa was reveiwed showing stable BMD.  Total hip -1.6, neck -2.5, spine -2.6. She is due for repeat dexa. She also has a history of Vit D deficiency and takes 50K u weekly.  Levels normal lately. No recent falls or fractures.  She remains very active.     She also has osteoarthritis mainly in her hands with sig degenerative changes at her pip and dip joints. Not sig painful just limit her activity at times,  is weaker.  She does not take anything for her arthritis as it really isn't painful.  No complaints with knee pain or shoulder pain.  She has a h/o breast ca and is still on tamoxifen.  Overall she is doing very well and has no issues today. Pain 0/10      Osteoarthritis   Associated symptoms include arthralgias. Pertinent negatives include no abdominal pain, chest pain, chills, coughing, fatigue, fever, headaches, joint swelling, myalgias, nausea, numbness, rash, sore throat, vomiting or weakness.     Review of Systems   Constitutional: Negative for chills, fatigue and fever.   HENT: Negative for mouth sores, rhinorrhea and sore throat.    Eyes: Negative for pain and redness.   Respiratory: Negative for cough and shortness of breath.    Cardiovascular: Negative for chest pain.   Gastrointestinal: Negative for abdominal pain, constipation, diarrhea, nausea and vomiting.   Genitourinary: Negative for dysuria and hematuria.   Musculoskeletal: Positive for arthralgias. Negative for joint swelling and myalgias.   Skin: Negative for rash.   Neurological: Negative for weakness, numbness and headaches.   Psychiatric/Behavioral: The patient is not nervous/anxious.       "    Objective:     /70   Pulse 84   Ht 4' 10" (1.473 m)   Wt 63.6 kg (140 lb 3.4 oz)   BMI 29.30 kg/m²      Physical Exam   Constitutional: She is oriented to person, place, and time and well-developed, well-nourished, and in no distress.   HENT:   Head: Normocephalic and atraumatic.   Eyes: Pupils are equal, round, and reactive to light. Right eye exhibits no discharge.   Neck: Normal range of motion.   Cardiovascular: Normal rate, regular rhythm and normal heart sounds.  Exam reveals no friction rub.    Pulmonary/Chest: Effort normal and breath sounds normal. No respiratory distress.   Abdominal: Soft. She exhibits no distension. There is no tenderness.   Lymphadenopathy:     She has no cervical adenopathy.   Neurological: She is alert and oriented to person, place, and time.   Skin: No rash noted. No erythema.     Psychiatric: Mood normal.   Musculoskeletal: Normal range of motion. She exhibits no edema or deformity.   Significant degenerative changes to naseem hands pip and dip joints, heberdens and bouchards nodes, no acute inflammation or synovitis notes  Naseem knees with good rom, no effusions, minimal crepitus         No results found for this or any previous visit (from the past 504 hour(s)).    DEXA 7/2016: osteoporosis stable bmd, Total hip -1.6, femur neck -2.5, spine -2.6     Assessment:       1. Primary osteoarthritis involving multiple joints    2. Senile osteoporosis    3. Hypovitaminosis D        Impression:    1.  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  Osteoporosis--on reclast holiday after 6 treatments, last dexa 7/2016 stable, no falls/fxs    3. Vit D deficiency: now normalized, taking 50,000units Vit d weekly    Plan:       Due for repeat dexa to determine treatment, consider maybe prolia if needed as she completed 6 doses of iv reclast, change to diff moa,  will get prolia approved so can start it next visit if needed  cont Vit " D once weekly 50,000units, calcium in diet  Tylenol ok prn joint pain    See dr starr back in 4-5 mos with dexa, cmp, vit D, possible prolia

## 2018-07-10 ENCOUNTER — OFFICE VISIT (OUTPATIENT)
Dept: RHEUMATOLOGY | Facility: CLINIC | Age: 83
End: 2018-07-10
Payer: MEDICARE

## 2018-07-10 VITALS
HEART RATE: 84 BPM | HEIGHT: 58 IN | WEIGHT: 140.19 LBS | DIASTOLIC BLOOD PRESSURE: 70 MMHG | SYSTOLIC BLOOD PRESSURE: 103 MMHG | BODY MASS INDEX: 29.43 KG/M2

## 2018-07-10 DIAGNOSIS — M15.9 PRIMARY OSTEOARTHRITIS INVOLVING MULTIPLE JOINTS: Primary | ICD-10-CM

## 2018-07-10 DIAGNOSIS — E55.9 HYPOVITAMINOSIS D: Chronic | ICD-10-CM

## 2018-07-10 DIAGNOSIS — M81.0 SENILE OSTEOPOROSIS: ICD-10-CM

## 2018-07-10 PROCEDURE — 99214 OFFICE O/P EST MOD 30 MIN: CPT | Mod: S$GLB,,, | Performed by: PHYSICIAN ASSISTANT

## 2018-07-10 PROCEDURE — 99999 PR PBB SHADOW E&M-EST. PATIENT-LVL IV: CPT | Mod: PBBFAC,,, | Performed by: PHYSICIAN ASSISTANT

## 2018-07-10 RX ORDER — ERGOCALCIFEROL 1.25 MG/1
50000 CAPSULE ORAL
Qty: 12 CAPSULE | Refills: 3 | Status: SHIPPED | OUTPATIENT
Start: 2018-07-10 | End: 2022-01-05

## 2018-07-10 NOTE — PATIENT INSTRUCTIONS
Refilled vitamin D weekly   Will get your bone density scheduled for next time   Will see dr starr next time, we will call you to schedule the visit

## 2018-08-25 DIAGNOSIS — R05.9 COUGH: ICD-10-CM

## 2018-08-25 DIAGNOSIS — R06.7 SNEEZING: ICD-10-CM

## 2018-08-25 DIAGNOSIS — R09.81 SINUS CONGESTION: ICD-10-CM

## 2018-08-25 DIAGNOSIS — R09.89 RUNNY NOSE: ICD-10-CM

## 2018-08-27 RX ORDER — MONTELUKAST SODIUM 10 MG/1
10 TABLET ORAL NIGHTLY
Qty: 90 TABLET | Refills: 1 | Status: SHIPPED | OUTPATIENT
Start: 2018-08-27 | End: 2018-11-19 | Stop reason: SDUPTHER

## 2018-09-04 ENCOUNTER — TELEPHONE (OUTPATIENT)
Dept: PULMONOLOGY | Facility: CLINIC | Age: 83
End: 2018-09-04

## 2018-09-04 NOTE — TELEPHONE ENCOUNTER
Returned patient call, who stated her cpap machine was broken and over 5 years old, scheduled patient F/u appointment patient stated understanding

## 2018-09-06 ENCOUNTER — OFFICE VISIT (OUTPATIENT)
Dept: PULMONOLOGY | Facility: CLINIC | Age: 83
End: 2018-09-06
Payer: MEDICARE

## 2018-09-06 VITALS
BODY MASS INDEX: 28.88 KG/M2 | SYSTOLIC BLOOD PRESSURE: 110 MMHG | OXYGEN SATURATION: 97 % | RESPIRATION RATE: 17 BRPM | HEART RATE: 93 BPM | WEIGHT: 137.56 LBS | DIASTOLIC BLOOD PRESSURE: 62 MMHG | HEIGHT: 58 IN

## 2018-09-06 DIAGNOSIS — G47.33 OSA (OBSTRUCTIVE SLEEP APNEA): Primary | ICD-10-CM

## 2018-09-06 PROCEDURE — 1101F PT FALLS ASSESS-DOCD LE1/YR: CPT | Mod: CPTII,,, | Performed by: NURSE PRACTITIONER

## 2018-09-06 PROCEDURE — 99999 PR PBB SHADOW E&M-EST. PATIENT-LVL III: CPT | Mod: PBBFAC,,, | Performed by: NURSE PRACTITIONER

## 2018-09-06 PROCEDURE — 99213 OFFICE O/P EST LOW 20 MIN: CPT | Mod: PBBFAC,PO | Performed by: NURSE PRACTITIONER

## 2018-09-06 PROCEDURE — 99213 OFFICE O/P EST LOW 20 MIN: CPT | Mod: S$PBB,,, | Performed by: NURSE PRACTITIONER

## 2018-09-06 NOTE — Clinical Note
cpap power issues-broken. May need replacement. Let me know if she gets new machine so I schedule 2 month compliance. thx

## 2018-09-06 NOTE — PROGRESS NOTES
"Subjective:      Patient ID: Samantha Balderrama is a 89 y.o. female.    Chief Complaint: Sleep Apnea     Patient presents to the office today for evaluation of sleep apnea.  Her CPAP that she received in 2017 is having power issues.  She states no power. She may need a new machine.  She has been benefiting from use and wearing nightly.    Patient Active Problem List:     Senile osteoporosis     Intermediate stage nonexudative age-related macular degeneration of both eyes     Serous detachment of retinal pigment epithelium     Pseudophakia     BASILIO on CPAP     Hypovitaminosis D     History of secondary lung cancer     History of malignant neoplasm of both breasts     Atherosclerosis of aorta     History of skin cancer     Primary osteoarthritis involving multiple joints     Dryness, eye            /62   Pulse 93   Resp 17   Ht 4' 10" (1.473 m)   Wt 62.4 kg (137 lb 9.1 oz)   SpO2 97%   BMI 28.75 kg/m²   Body mass index is 28.75 kg/m².    Review of Systems   Constitutional: Negative.    HENT: Negative.    Respiratory: Negative.    Cardiovascular: Negative.    Musculoskeletal: Negative.    Gastrointestinal: Negative.    Neurological: Negative.    Psychiatric/Behavioral: Negative.      Objective:      Physical Exam   Constitutional: She is oriented to person, place, and time. She appears well-developed and well-nourished.   HENT:   Head: Normocephalic and atraumatic.   Neck: Normal range of motion. Neck supple.   Cardiovascular: Normal rate and regular rhythm.   Pulmonary/Chest: Effort normal and breath sounds normal.   Musculoskeletal: Normal range of motion.   Neurological: She is alert and oriented to person, place, and time.   Skin: Skin is warm and dry.   Psychiatric: She has a normal mood and affect.       Assessment:       1. BASILIO (obstructive sleep apnea)        Outpatient Encounter Medications as of 9/6/2018   Medication Sig Dispense Refill    atorvastatin (LIPITOR) 20 MG tablet Take 1 tablet (20 mg " total) by mouth once daily. 30 tablet 11    ergocalciferol (ERGOCALCIFEROL) 50,000 unit Cap Take 1 capsule (50,000 Units total) by mouth every 7 days. 12 capsule 3    montelukast (SINGULAIR) 10 mg tablet TAKE 1 TABLET (10 MG TOTAL) BY MOUTH EVERY EVENING. 90 tablet 1    salt moisturizing solution no1 (OCEAN ULTRA SALINE MIST) Mist 2 Pump by Nasal route 2 (two) times daily as needed. 90 mL 0    tamoxifen (NOLVADEX) 20 MG Tab TAKE 1 TABLET (20 MG TOTAL) BY MOUTH ONCE DAILY. 90 tablet 1    VIT A/VIT C/VIT E/ZINC/COPPER (PRESERVISION AREDS ORAL) Take by mouth.       No facility-administered encounter medications on file as of 9/6/2018.      Orders Placed This Encounter   Procedures    CPAP FOR HOME USE     CPAP broken, power issue.     Order Specific Question:   Type:     Answer:   CPAP     Order Specific Question:   CPAP setting (cmH20):     Answer:   10     Order Specific Question:   Length of need (1-99 months):     Answer:   99     Order Specific Question:   Humidification:     Answer:   Heated     Order Specific Question:   Type of mask:     Answer:   Nasal     Order Specific Question:   Headgear?     Answer:   Yes     Order Specific Question:   Tubing?     Answer:   Yes     Order Specific Question:   Humidifier chamber?     Answer:   Yes     Order Specific Question:   Chin strap?     Answer:   Yes     Order Specific Question:   Filters?     Answer:   Yes     Plan:       She did not bring her machine in today to evaluate.  Make appointment with DME.  Machine needs to be evaluated to be repaired or replaced.  If she receives the replacement, follow-up in 2 months to review compliance.

## 2018-10-10 DIAGNOSIS — Z85.3 HISTORY OF MALIGNANT NEOPLASM OF BOTH BREASTS: ICD-10-CM

## 2018-10-10 DIAGNOSIS — Z85.89 HISTORY OF SECONDARY LUNG CANCER: Primary | ICD-10-CM

## 2018-10-17 ENCOUNTER — PES CALL (OUTPATIENT)
Dept: ADMINISTRATIVE | Facility: CLINIC | Age: 83
End: 2018-10-17

## 2018-11-13 ENCOUNTER — OFFICE VISIT (OUTPATIENT)
Dept: INTERNAL MEDICINE | Facility: CLINIC | Age: 83
End: 2018-11-13
Payer: MEDICARE

## 2018-11-13 VITALS
OXYGEN SATURATION: 98 % | SYSTOLIC BLOOD PRESSURE: 82 MMHG | BODY MASS INDEX: 29.24 KG/M2 | TEMPERATURE: 99 F | DIASTOLIC BLOOD PRESSURE: 60 MMHG | RESPIRATION RATE: 18 BRPM | WEIGHT: 139.31 LBS | HEIGHT: 58 IN | HEART RATE: 106 BPM

## 2018-11-13 DIAGNOSIS — H35.729: ICD-10-CM

## 2018-11-13 DIAGNOSIS — E66.3 OVERWEIGHT: ICD-10-CM

## 2018-11-13 DIAGNOSIS — Z85.89 HISTORY OF SECONDARY LUNG CANCER: Chronic | ICD-10-CM

## 2018-11-13 DIAGNOSIS — E55.9 HYPOVITAMINOSIS D: Chronic | ICD-10-CM

## 2018-11-13 DIAGNOSIS — C44.81 BASAL CELL CARCINOMA (BCC) OF OVERLAPPING SITES OF SKIN: ICD-10-CM

## 2018-11-13 DIAGNOSIS — H35.3132 INTERMEDIATE STAGE NONEXUDATIVE AGE-RELATED MACULAR DEGENERATION OF BOTH EYES: Primary | Chronic | ICD-10-CM

## 2018-11-13 DIAGNOSIS — I70.0 ATHEROSCLEROSIS OF AORTA: ICD-10-CM

## 2018-11-13 DIAGNOSIS — Z85.828 HISTORY OF SKIN CANCER: Chronic | ICD-10-CM

## 2018-11-13 DIAGNOSIS — M15.9 PRIMARY OSTEOARTHRITIS INVOLVING MULTIPLE JOINTS: Chronic | ICD-10-CM

## 2018-11-13 DIAGNOSIS — M81.0 SENILE OSTEOPOROSIS: Chronic | ICD-10-CM

## 2018-11-13 DIAGNOSIS — G47.33 OSA ON CPAP: Chronic | ICD-10-CM

## 2018-11-13 DIAGNOSIS — Z85.3 HISTORY OF BILATERAL BREAST CANCER: Chronic | ICD-10-CM

## 2018-11-13 PROCEDURE — G0439 PPPS, SUBSEQ VISIT: HCPCS | Mod: HCNC,S$GLB,, | Performed by: PHYSICIAN ASSISTANT

## 2018-11-13 PROCEDURE — 99999 PR PBB SHADOW E&M-EST. PATIENT-LVL IV: CPT | Mod: PBBFAC,HCNC,, | Performed by: PHYSICIAN ASSISTANT

## 2018-11-13 PROCEDURE — 99499 UNLISTED E&M SERVICE: CPT | Mod: S$GLB,,, | Performed by: PHYSICIAN ASSISTANT

## 2018-11-13 RX ORDER — LEVOFLOXACIN 250 MG/1
250 TABLET ORAL DAILY
COMMUNITY
Start: 2018-11-12 | End: 2018-12-19

## 2018-11-13 RX ORDER — PROMETHAZINE HYDROCHLORIDE AND DEXTROMETHORPHAN HYDROBROMIDE 6.25; 15 MG/5ML; MG/5ML
5 SYRUP ORAL DAILY PRN
COMMUNITY
Start: 2018-11-12 | End: 2018-12-19

## 2018-11-13 RX ORDER — PREDNISONE 20 MG/1
20 TABLET ORAL DAILY
COMMUNITY
Start: 2018-11-12 | End: 2018-12-19

## 2018-11-13 NOTE — PROGRESS NOTES
"Samantha Balderrama presented for a  Medicare AWV and comprehensive Health Risk Assessment today. The following components were reviewed and updated:    · Medical history  · Family History  · Social history  · Allergies and Current Medications  · Health Risk Assessment  · Health Maintenance  · Care Team     She was called in by [facility] to have her annual health risk assessment examination accomplished today.  She really does not have any current problems that she is worried about.  She just recently got her flu shot done.     See Completed Assessments for Annual Wellness Visit within the encounter summary.       The following assessments were completed:  · Living Situation  · CAGE  · Depression Screening  · Timed Get Up and Go  · Whisper Test  · Cognitive Function Screening  · Nutrition Screening  · ADL Screening  · PAQ Screening    Vitals:    11/13/18 0946   BP: (!) 82/60   BP Location: Left arm   Patient Position: Sitting   BP Method: Medium (Manual)   Pulse: 106   Resp: 18   Temp: 99 °F (37.2 °C)   TempSrc: Tympanic   SpO2: 98%   Weight: 63.2 kg (139 lb 5.3 oz)   Height: 4' 10" (1.473 m)     Body mass index is 29.12 kg/m².  Physical Exam   Constitutional: She is oriented to person, place, and time. She appears well-developed and well-nourished.   She is ambulatory with a normal gait and good posture.  She does not use any ancillary mechanical device such as a cane, crutch, or walker.     HENT:   Head: Normocephalic and atraumatic.   Right Ear: External ear normal.   Left Ear: External ear normal.   Nose: Nose normal.   Mouth/Throat: Oropharynx is clear and moist.   She wears bilateral hearing aids.  She has most of her teeth and they appear to be in good repair.  She has no partial plates.   Eyes: Conjunctivae and EOM are normal. Pupils are equal, round, and reactive to light. Right eye exhibits no discharge. Left eye exhibits no discharge. No scleral icterus.   She does not wear glasses.   Neck: " Normal range of motion. Neck supple. No tracheal deviation present. No thyromegaly present.   Cardiovascular: Normal rate, regular rhythm, normal heart sounds and intact distal pulses. Exam reveals no gallop.   No murmur heard.  Pulmonary/Chest: Effort normal and breath sounds normal. She has no wheezes. She has no rales.   Abdominal: Soft. Bowel sounds are normal. She exhibits no distension and no mass. There is no tenderness. There is no rebound and no guarding.   Genitourinary:   Genitourinary Comments: This portion of the examination was not accomplished today.   Musculoskeletal: Normal range of motion. She exhibits no edema.   Lymphadenopathy:     She has no cervical adenopathy.   Neurological: She is alert and oriented to person, place, and time. She has normal reflexes. She displays normal reflexes. No cranial nerve deficit. She exhibits normal muscle tone.   Skin: Skin is warm and dry. Capillary refill takes 2 to 3 seconds. No rash noted. She is not diaphoretic. No erythema. No pallor.   Psychiatric: She has a normal mood and affect. Her behavior is normal. Judgment and thought content normal.   Nursing note and vitals reviewed.        Diagnoses and health risks identified today and associated recommendations/orders:    1. Squamous cell carcinoma  This has been followed in the past by her dermatologist Dr. Flori Nielsen MD.    2. Overweight  This is followed by her PCP Dr. REHAN Toney MD.    3. Intermediate stage nonexudative age-related macular degeneration of both eyes  This is followed by her ophthalmologists Antonio Crocker and MD Munir.    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    5. Atherosclerosis of aorta  Check answer to 2.    6. Basal cell carcinoma (BCC) of overlapping sites of skin  Check answer to 1.    7. History of bilateral breast cancer  Check answer to number 2.    8. History of secondary lung cancer  Check answer to 2.    9. History of  skin cancer  Check answer to 1.    10. Hypovitaminosis D  Check answer to 2.    11. Primary osteoarthritis involving multiple joints  Check answer to 2.    12. Senile osteoporosis  Check answer to 2.    13. BASILIO on CPAP  This is followed in Pulmonary Medicine by Elizabeth LeJeune, NP .      Provided Samantha with a 5-10 year written screening schedule and personal prevention plan. Recommendations were developed using the USPSTF age appropriate recommendations. Education, counseling, and referrals were provided as needed. After Visit Summary printed and given to patient which includes a list of additional screenings\tests needed.    She is up-to-date on most of her health maintenance needs except for her tetanus shot.  She recently got her flu shot in the past month.  There is no need for any ancillary tests or change in Rx at this time.    Follow-up in about 6 months (around 5/13/2019).    Justin Swanson PA-C

## 2018-11-19 DIAGNOSIS — R09.81 SINUS CONGESTION: ICD-10-CM

## 2018-11-19 DIAGNOSIS — R05.9 COUGH: ICD-10-CM

## 2018-11-19 DIAGNOSIS — R06.7 SNEEZING: ICD-10-CM

## 2018-11-19 DIAGNOSIS — R09.89 RUNNY NOSE: ICD-10-CM

## 2018-11-19 RX ORDER — MONTELUKAST SODIUM 10 MG/1
10 TABLET ORAL NIGHTLY
Qty: 90 TABLET | Refills: 3 | Status: SHIPPED | OUTPATIENT
Start: 2018-11-19 | End: 2018-11-28 | Stop reason: SDUPTHER

## 2018-11-20 ENCOUNTER — TELEPHONE (OUTPATIENT)
Dept: RHEUMATOLOGY | Facility: CLINIC | Age: 83
End: 2018-11-20

## 2018-11-28 DIAGNOSIS — R06.7 SNEEZING: ICD-10-CM

## 2018-11-28 DIAGNOSIS — R09.81 SINUS CONGESTION: ICD-10-CM

## 2018-11-28 DIAGNOSIS — R09.89 RUNNY NOSE: ICD-10-CM

## 2018-11-28 DIAGNOSIS — R05.9 COUGH: ICD-10-CM

## 2018-11-28 RX ORDER — MONTELUKAST SODIUM 10 MG/1
10 TABLET ORAL NIGHTLY
Qty: 90 TABLET | Refills: 3 | Status: SHIPPED | OUTPATIENT
Start: 2018-11-28 | End: 2020-02-10

## 2018-12-17 NOTE — PROGRESS NOTES
ONCOLOGIST: JACQUELINE Toney MD    CC: 89-year-old female with a history of bilateral breast cancer with metastatic disease to the lung status post lung resection, bilateral mastectomies, and radiation therapy/ chemo in 1985. She has been on long term Tamoxifen with no adverse side effects. She has been followed every 6 mons with clinical exams, labs and chest x-rays.     Pt has dips in her platelet counts intermittently dating back to Sept 2012. Saw Dr. Toney 9/25/15- no further recommendations- just follow cbc. Dr. Toney would like for her to remain on Tamoxifen indefinitely. Had gyn exam with Dr. Coronado on 10/7/15.     Pt is seen every 6 mon chest x-ray and chest wall exam. Lab review.     ROS: Denies sob, chest pain, weakness or fatigue, dizziness, nausea, wt loss or gain. No sore throat. No sinus congestion, dry cough or sneezing. No fever or night sweats. No itchy eyes. No lela aches or pains out of the ordinary. No change in bowel or bladder function or character. No chest wall lumps. No appetite changes. No vaginal discharge or bleeding. No lower extremity edema or pain. Has hearing aids and hears very well. No gingival bleeding, nose bleeds or unusual bruising. Not walking- admits needs to get back in to it. States feels good.  Balance of ROS is negative otherwise.     PAST MEDICAL HISTORY: Arthritis, obstructive sleep apnea, bilateral breast cancer with metastatic disease to lung s/p resection, osteoporosis, thrombocytopenia.     PAST SURGICAL HISTORY: Bilateral mastectomies. Lung resection.     FAMILY HISTORY: Sisters with hypertension, father and mother with heart disease, sister with asthma, sister with breast cancer, aunt with colon cancer. Niece in mid 30's breast cancer. There is also a family history of osteoporosis.     SOCIAL HISTORY: She is single, never , no children. Does not smoke and reports alcohol usage of 1 to 2 per week, caffeine 4 to 5 per day. Denies any regular exercise program.  Works or volunteers several times a month with [employer]     PE:   GENERAL: Awake, alert, adult female. Appears younger than her stated age. Well-nourished and developed.  HEAD: Atraumatic and normocephalic. Pupils equal round reactive to light. Conjunctiva is non-icteric. Oral mucosa pink and moist with no posterior erythema. Nasal passages patent and pale/boggy in appearance.  No visible blood and nasal passages are in the back of her throat.  Bilateral tympanic membranes noted with no redness or swelling.  NECK: Soft, supple, symmetric. No thyromegaly. No bruit garett carotids.   LYMPHATICS: There is no cervical, supraclavicular, infraclavicular or axillary adenopathy.  CV: Regular rate rhythm with no murmurs or gallops.  CHEST: Clear to auscultation. Unlabored respiratory effort.  CHEST WALL: Breasts are surgically absent. There is no visible rash, lump, erythema, dimpling, or architectural distortion over bilateral chest walls. No palpable abnormality appreciated over the chest walls.  ABDOMEN: Soft, nontender, no hepatosplenomegaly.  SKIN: Warm and dry to touch. Skin turgor within normal limits.    EXTREMITIES: There is no clubbing, cyanosis, or edema. No evidence of lymphedema in the upper extremities.  PSYCH: Affect is appropriate and pleasant.    CHEST X-ray - pending  Last colonoscopy 2/2005 - had polyp excised. Was recommended by Dr. Pierre to have 5 year follow-up. This was ordered and Gastro decided due to her age she would be ok not to have the scope.     ASSESSMENT:  1. Bilateral breast cancer with metastatic disease to the lung. Status post bilateral mastectomies and lung resection. Status post chemotherapy and chest wall radiation and on long-term tamoxifen.  2. Sleep apnea. Using CPAP without difficulty  3. Health maintenance. Colonoscopy canceled by gastro - decided not needed  4. Hearing loss corrected with bilateral hearing aides  5. Thrombocytopenia intermittent and relatively stable-   6.  CXR - pending  7.  Need for pneumococcal 13 vaccine.  Due for tetanus-    PLAN:  1.  RTC in June 2019 for PA and lateral chest x-ray, CBC, lipids and chem 20  2. B12 sublinguinal daily.   3. Patient was asked to perform monthly chest wall examinations and was instructed on what to look for during the exam.  4. Patient encouraged to get back into her walking and she agrees.    5. Pneumococcal valent 13 vaccine today and tetanus vaccine- potential side effects of injection explained. Pt verbalized understanding and acceptance.     Clarice Lombardi, RN, MSN, NP-C

## 2018-12-19 ENCOUNTER — HOSPITAL ENCOUNTER (OUTPATIENT)
Dept: RADIOLOGY | Facility: HOSPITAL | Age: 83
Discharge: HOME | End: 2018-12-19
Attending: NURSE PRACTITIONER
Payer: MEDICARE

## 2018-12-19 ENCOUNTER — OFFICE VISIT (OUTPATIENT)
Dept: HEMATOLOGY/ONCOLOGY | Facility: CLINIC | Age: 83
End: 2018-12-19
Payer: MEDICARE

## 2018-12-19 ENCOUNTER — IMMUNIZATION (OUTPATIENT)
Dept: PHARMACY | Facility: CLINIC | Age: 83
End: 2018-12-19
Payer: MEDICARE

## 2018-12-19 VITALS
TEMPERATURE: 98 F | BODY MASS INDEX: 29.57 KG/M2 | HEART RATE: 94 BPM | HEIGHT: 58 IN | WEIGHT: 140.88 LBS | SYSTOLIC BLOOD PRESSURE: 96 MMHG | OXYGEN SATURATION: 95 % | RESPIRATION RATE: 16 BRPM | DIASTOLIC BLOOD PRESSURE: 74 MMHG

## 2018-12-19 DIAGNOSIS — Z85.89 HISTORY OF SECONDARY LUNG CANCER: Chronic | ICD-10-CM

## 2018-12-19 DIAGNOSIS — Z85.3 HISTORY OF BILATERAL BREAST CANCER: Chronic | ICD-10-CM

## 2018-12-19 DIAGNOSIS — C44.81 BASAL CELL CARCINOMA (BCC) OF OVERLAPPING SITES OF SKIN: ICD-10-CM

## 2018-12-19 DIAGNOSIS — Z85.828 HISTORY OF SKIN CANCER: Chronic | ICD-10-CM

## 2018-12-19 DIAGNOSIS — Z85.3 HISTORY OF BILATERAL BREAST CANCER: Primary | Chronic | ICD-10-CM

## 2018-12-19 PROCEDURE — 1101F PT FALLS ASSESS-DOCD LE1/YR: CPT | Mod: CPTII,HCNC,S$GLB, | Performed by: NURSE PRACTITIONER

## 2018-12-19 PROCEDURE — 99999 PR PBB SHADOW E&M-EST. PATIENT-LVL III: CPT | Mod: PBBFAC,HCNC,, | Performed by: NURSE PRACTITIONER

## 2018-12-19 PROCEDURE — 99214 OFFICE O/P EST MOD 30 MIN: CPT | Mod: HCNC,25,S$GLB, | Performed by: NURSE PRACTITIONER

## 2018-12-19 PROCEDURE — 90670 PCV13 VACCINE IM: CPT | Mod: HCNC,S$GLB,, | Performed by: NURSE PRACTITIONER

## 2018-12-19 PROCEDURE — 71046 X-RAY EXAM CHEST 2 VIEWS: CPT | Mod: TC,FY,HCNC,PO

## 2018-12-19 PROCEDURE — 71046 X-RAY EXAM CHEST 2 VIEWS: CPT | Mod: 26,HCNC,, | Performed by: RADIOLOGY

## 2018-12-19 PROCEDURE — G0009 ADMIN PNEUMOCOCCAL VACCINE: HCPCS | Mod: HCNC,S$GLB,, | Performed by: NURSE PRACTITIONER

## 2018-12-19 PROCEDURE — 99499 UNLISTED E&M SERVICE: CPT | Mod: HCNC,S$GLB,, | Performed by: NURSE PRACTITIONER

## 2019-02-01 ENCOUNTER — OFFICE VISIT (OUTPATIENT)
Dept: OPHTHALMOLOGY | Facility: CLINIC | Age: 84
End: 2019-02-01
Payer: MEDICARE

## 2019-02-01 DIAGNOSIS — H35.3132 INTERMEDIATE STAGE NONEXUDATIVE AGE-RELATED MACULAR DEGENERATION OF BOTH EYES: Primary | ICD-10-CM

## 2019-02-01 PROCEDURE — 99999 PR PBB SHADOW E&M-EST. PATIENT-LVL II: CPT | Mod: PBBFAC,HCNC,, | Performed by: OPHTHALMOLOGY

## 2019-02-01 PROCEDURE — 99999 PR PBB SHADOW E&M-EST. PATIENT-LVL II: ICD-10-PCS | Mod: PBBFAC,HCNC,, | Performed by: OPHTHALMOLOGY

## 2019-02-01 PROCEDURE — 92134 CPTRZ OPH DX IMG PST SGM RTA: CPT | Mod: HCNC,S$GLB,, | Performed by: OPHTHALMOLOGY

## 2019-02-01 PROCEDURE — 92014 PR EYE EXAM, EST PATIENT,COMPREHESV: ICD-10-PCS | Mod: HCNC,S$GLB,, | Performed by: OPHTHALMOLOGY

## 2019-02-01 PROCEDURE — 92014 COMPRE OPH EXAM EST PT 1/>: CPT | Mod: HCNC,S$GLB,, | Performed by: OPHTHALMOLOGY

## 2019-02-01 PROCEDURE — 92134 POSTERIOR SEGMENT OCT RETINA (OCULAR COHERENCE TOMOGRAPHY)-BOTH EYES: ICD-10-PCS | Mod: HCNC,S$GLB,, | Performed by: OPHTHALMOLOGY

## 2019-02-01 NOTE — PROGRESS NOTES
===============================  02/01/2019   Samantha Balderrama,   89 y.o. female   Last visit [facility]: :5/29/2018   Last visit eye dept. Visit date not found  VA:  Uncorrected distance visual acuity was 20/20 -2 in the right eye and 20/40 -2 in the left eye.  Tonometry     Tonometry (Applanation, 10:48 AM)       Right Left    Pressure 20 22              Wearing Rx     Wearing Rx       Sphere Cylinder Axis Add    Right -1.00 +1.00 108 +3.00    Left Cannon +0.50 070 +3.00    Type:  Bifocal              Manifest Refraction     Manifest Refraction       Sphere Cylinder Axis Dist VA    Right -0.75 +0.75 105 20/20    Left Cannon +0.50 072 20/40-2              Chief Complaint   Patient presents with    Macular Degeneration        HPI     Yearly Eye Exam /Dry AMD  No changes noted in [facility]  No pain or discomfort  Patient doesn't use glasses for driving    1. H/o episodic vision loss  2. PCIOL OU  3. Dry Eyes  4. Dry AMD OU /FIBROTIC STRAND OS  5. Blepharitis  6. Refractive Error    Systane PRN OU    Last edited by Estela Rodriguez on 2/1/2019 10:36 AM. (History)          ________________  2/1/2019  Problem List Items Addressed This Visit     None        Oct ok   pciol ou  rtc 1 year    .       ===========================

## 2019-02-21 DIAGNOSIS — C50.011 MALIGNANT NEOPLASM OF NIPPLE OF RIGHT BREAST IN FEMALE: ICD-10-CM

## 2019-02-21 RX ORDER — TAMOXIFEN CITRATE 20 MG/1
20 TABLET ORAL DAILY
Qty: 90 TABLET | Refills: 0 | Status: SHIPPED | OUTPATIENT
Start: 2019-02-21 | End: 2019-05-15 | Stop reason: SDUPTHER

## 2019-05-15 DIAGNOSIS — C50.011 MALIGNANT NEOPLASM OF NIPPLE OF RIGHT BREAST IN FEMALE: ICD-10-CM

## 2019-05-15 RX ORDER — TAMOXIFEN CITRATE 20 MG/1
TABLET ORAL
Qty: 90 TABLET | Refills: 0 | Status: SHIPPED | OUTPATIENT
Start: 2019-05-15 | End: 2019-08-14 | Stop reason: SDUPTHER

## 2019-07-01 NOTE — PROGRESS NOTES
ONCOLOGIST: JACQUELINE Toney MD    CC: 89-year-old female with a history of bilateral breast cancer with metastatic disease to the lung status post lung resection, bilateral mastectomies, and radiation therapy/ chemo in 1985. She has been on long term Tamoxifen with no adverse side effects. She has been followed every 6 mons with clinical exams, labs and chest x-rays.     Pt has dips in her platelet counts intermittently dating back to Sept 2012. Saw Dr. Toney 9/25/15- no further recommendations- just follow cbc. Dr. Toney would like for her to remain on Tamoxifen indefinitely. Had gyn exam with Dr. Coronado on 10/7/15.     Pt is seen every 6 mon chest x-ray and chest wall exam. Lab review.     ROS: Denies sob, chest pain, weakness or fatigue, dizziness, nausea, wt loss or gain. No sore throat. No sinus congestion, dry cough or sneezing. No fever or night sweats. No itchy eyes. No lela aches or pains out of the ordinary. No change in bowel or bladder function or character. No chest wall lumps. No appetite changes. No vaginal discharge or bleeding. No lower extremity edema or pain. Has hearing aids and hears very well. No gingival bleeding, nose bleeds or unusual bruising. Not walking- admits needs to get back in to it. States feels good.  Balance of ROS is negative otherwise.     PAST MEDICAL HISTORY: Arthritis, obstructive sleep apnea, bilateral breast cancer with metastatic disease to lung s/p resection, osteoporosis, thrombocytopenia.     PAST SURGICAL HISTORY: Bilateral mastectomies. Lung resection.     FAMILY HISTORY: Sisters with hypertension, father and mother with heart disease, sister with asthma, sister with breast cancer, aunt with colon cancer. Niece in mid 30's breast cancer. There is also a family history of osteoporosis.     SOCIAL HISTORY: She is single, never , no children. Does not smoke and reports alcohol usage of 1 to 2 per week, caffeine 4 to 5 per day. Denies any regular exercise program.  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 Appears younger than her stated age. Well-nourished and developed.  HEAD: Atraumatic and normocephalic. Pupils equal round reactive to light. Conjunctiva is non-icteric. Oral mucosa pink and moist with no posterior erythema. Nasal passages patent and pale/boggy in appearance.  No visible blood and nasal passages are in the back of her throat.  Bilateral tympanic membranes noted with no redness or swelling.  NECK: Soft, supple, symmetric. No thyromegaly. No bruit garett carotids.   LYMPHATICS: There is no cervical, supraclavicular, infraclavicular or axillary adenopathy.  CV: Regular rate rhythm with no murmurs or gallops.  CHEST: Clear to auscultation. Unlabored respiratory effort.  CHEST WALL: Breasts are surgically absent. There is no visible rash, lump, erythema, dimpling, or architectural distortion over bilateral chest walls. No palpable abnormality appreciated over the chest walls.  ABDOMEN: Soft, nontender, no hepatosplenomegaly.  SKIN: Warm and dry to touch. Skin turgor within normal limits.    EXTREMITIES: There is no clubbing, cyanosis, or edema. No evidence of lymphedema in the upper extremities.  PSYCH: Affect is appropriate and pleasant.    CHEST X-ray - stable  Last colonoscopy 2/2005 - had polyp excised. Was recommended by Dr. Pierre to have 5 year follow-up. This was ordered and Gastro decided due to her age she would be ok not to have the scope.     ASSESSMENT:  1. Bilateral breast cancer with metastatic disease to the lung. Status post bilateral mastectomies and lung resection. Status post chemotherapy and chest wall radiation and on long-term tamoxifen.  2. Sleep apnea. Using CPAP without difficulty  3. Health maintenance. Colonoscopy canceled by gastro - decided not needed  4. Hearing loss corrected with bilateral hearing aides  5. Thrombocytopenia intermittent and relatively stable-   6. CXR  - wnl  7.  Labs wnl    PLAN:  1.  RTC in Jan 2020 for PA and lateral chest x-ray, CBC and chem 20  2. B12 sublinguinal daily.   3. Patient was asked to perform monthly chest wall examinations and was instructed on what to look for during the exam. Continue to take Tamoxifen as directed by Dr. Toney  4. Patient encouraged to get back into her walking and she agrees.  Pt agrees to wt loss of 10 # by Jan 2020    Clarice Lombardi RN, MSN, NP-C

## 2019-07-02 ENCOUNTER — HOSPITAL ENCOUNTER (OUTPATIENT)
Dept: RADIOLOGY | Facility: HOSPITAL | Age: 84
Discharge: HOME | End: 2019-07-02
Attending: NURSE PRACTITIONER
Payer: MEDICARE

## 2019-07-02 ENCOUNTER — OFFICE VISIT (OUTPATIENT)
Dept: HEMATOLOGY/ONCOLOGY | Facility: CLINIC | Age: 84
End: 2019-07-02
Payer: MEDICARE

## 2019-07-02 VITALS
HEART RATE: 87 BPM | HEIGHT: 58 IN | WEIGHT: 139.75 LBS | OXYGEN SATURATION: 96 % | SYSTOLIC BLOOD PRESSURE: 100 MMHG | RESPIRATION RATE: 18 BRPM | TEMPERATURE: 99 F | BODY MASS INDEX: 29.33 KG/M2 | DIASTOLIC BLOOD PRESSURE: 78 MMHG

## 2019-07-02 DIAGNOSIS — C44.81 BASAL CELL CARCINOMA (BCC) OF OVERLAPPING SITES OF SKIN: ICD-10-CM

## 2019-07-02 DIAGNOSIS — Z85.89 HISTORY OF SECONDARY LUNG CANCER: Chronic | ICD-10-CM

## 2019-07-02 DIAGNOSIS — Z85.89 HISTORY OF SECONDARY LUNG CANCER: ICD-10-CM

## 2019-07-02 DIAGNOSIS — Z85.3 HISTORY OF BILATERAL BREAST CANCER: ICD-10-CM

## 2019-07-02 DIAGNOSIS — Z85.828 HISTORY OF SKIN CANCER: ICD-10-CM

## 2019-07-02 DIAGNOSIS — Z85.3 HISTORY OF BILATERAL BREAST CANCER: Primary | Chronic | ICD-10-CM

## 2019-07-02 PROCEDURE — 99999 PR PBB SHADOW E&M-EST. PATIENT-LVL III: CPT | Mod: PBBFAC,HCNC,, | Performed by: NURSE PRACTITIONER

## 2019-07-02 PROCEDURE — 1101F PT FALLS ASSESS-DOCD LE1/YR: CPT | Mod: HCNC,CPTII,S$GLB, | Performed by: NURSE PRACTITIONER

## 2019-07-02 PROCEDURE — 99499 UNLISTED E&M SERVICE: CPT | Mod: HCNC,S$GLB,, | Performed by: NURSE PRACTITIONER

## 2019-07-02 PROCEDURE — 99214 PR OFFICE/OUTPT VISIT, EST, LEVL IV, 30-39 MIN: ICD-10-PCS | Mod: HCNC,S$GLB,, | Performed by: NURSE PRACTITIONER

## 2019-07-02 PROCEDURE — 99214 OFFICE O/P EST MOD 30 MIN: CPT | Mod: HCNC,S$GLB,, | Performed by: NURSE PRACTITIONER

## 2019-07-02 PROCEDURE — 71046 XR CHEST PA AND LATERAL: ICD-10-PCS | Mod: 26,HCNC,, | Performed by: RADIOLOGY

## 2019-07-02 PROCEDURE — 1101F PR PT FALLS ASSESS DOC 0-1 FALLS W/OUT INJ PAST YR: ICD-10-PCS | Mod: HCNC,CPTII,S$GLB, | Performed by: NURSE PRACTITIONER

## 2019-07-02 PROCEDURE — 99999 PR PBB SHADOW E&M-EST. PATIENT-LVL III: ICD-10-PCS | Mod: PBBFAC,HCNC,, | Performed by: NURSE PRACTITIONER

## 2019-07-02 PROCEDURE — 71046 X-RAY EXAM CHEST 2 VIEWS: CPT | Mod: 26,HCNC,, | Performed by: RADIOLOGY

## 2019-07-02 PROCEDURE — 71046 X-RAY EXAM CHEST 2 VIEWS: CPT | Mod: TC,HCNC

## 2019-07-02 PROCEDURE — 99499 RISK ADDL DX/OHS AUDIT: ICD-10-PCS | Mod: HCNC,S$GLB,, | Performed by: NURSE PRACTITIONER

## 2019-07-06 DIAGNOSIS — E78.00 ELEVATED CHOLESTEROL: Primary | ICD-10-CM

## 2019-07-08 RX ORDER — ATORVASTATIN CALCIUM 20 MG/1
TABLET, FILM COATED ORAL
Qty: 90 TABLET | Refills: 3 | Status: ON HOLD | OUTPATIENT
Start: 2019-07-08 | End: 2021-02-03 | Stop reason: HOSPADM

## 2019-07-10 ENCOUNTER — OFFICE VISIT (OUTPATIENT)
Dept: SLEEP MEDICINE | Facility: CLINIC | Age: 84
End: 2019-07-10
Payer: MEDICARE

## 2019-07-10 VITALS
RESPIRATION RATE: 16 BRPM | OXYGEN SATURATION: 95 % | SYSTOLIC BLOOD PRESSURE: 134 MMHG | BODY MASS INDEX: 29.63 KG/M2 | WEIGHT: 141.13 LBS | HEART RATE: 99 BPM | HEIGHT: 58 IN | DIASTOLIC BLOOD PRESSURE: 60 MMHG

## 2019-07-10 DIAGNOSIS — G47.33 OSA ON CPAP: Primary | Chronic | ICD-10-CM

## 2019-07-10 PROCEDURE — 99214 PR OFFICE/OUTPT VISIT, EST, LEVL IV, 30-39 MIN: ICD-10-PCS | Mod: HCNC,S$GLB,, | Performed by: NURSE PRACTITIONER

## 2019-07-10 PROCEDURE — 1101F PT FALLS ASSESS-DOCD LE1/YR: CPT | Mod: HCNC,CPTII,S$GLB, | Performed by: NURSE PRACTITIONER

## 2019-07-10 PROCEDURE — 99999 PR PBB SHADOW E&M-EST. PATIENT-LVL III: ICD-10-PCS | Mod: PBBFAC,HCNC,, | Performed by: NURSE PRACTITIONER

## 2019-07-10 PROCEDURE — 1101F PR PT FALLS ASSESS DOC 0-1 FALLS W/OUT INJ PAST YR: ICD-10-PCS | Mod: HCNC,CPTII,S$GLB, | Performed by: NURSE PRACTITIONER

## 2019-07-10 PROCEDURE — 99999 PR PBB SHADOW E&M-EST. PATIENT-LVL III: CPT | Mod: PBBFAC,HCNC,, | Performed by: NURSE PRACTITIONER

## 2019-07-10 PROCEDURE — 99214 OFFICE O/P EST MOD 30 MIN: CPT | Mod: HCNC,S$GLB,, | Performed by: NURSE PRACTITIONER

## 2019-07-10 NOTE — PROGRESS NOTES
"Subjective:      Patient ID: Samantha Balderrama is a 89 y.o. female.    Chief Complaint: Sleep Apnea    HPI  Presents to office for review of CPAP therapy. Patient states improved symptoms with use of CPAP. Sleeping more soundly. Waking up feeling more refreshed. Improved daytime sleepiness. Patient states she is benefiting from use of the CPAP.     Patient Active Problem List   Diagnosis    Senile osteoporosis    Intermediate stage nonexudative age-related macular degeneration of both eyes    Serous detachment of retinal pigment epithelium    Pseudophakia    BASILIO on CPAP    Hypovitaminosis D    History of secondary lung cancer    History of bilateral breast cancer    Atherosclerosis of aorta    History of skin cancer    Primary osteoarthritis involving multiple joints    Dryness, eye    Squamous cell carcinoma    Basal cell carcinoma    Overweight         /60   Pulse 99   Resp 16   Ht 4' 10" (1.473 m)   Wt 64 kg (141 lb 1.5 oz)   SpO2 95%   BMI 29.49 kg/m²   Body mass index is 29.49 kg/m².    Review of Systems   Constitutional: Negative.    HENT: Negative.    Respiratory: Negative.    Cardiovascular: Negative.    Musculoskeletal: Negative.    Gastrointestinal: Negative.    Neurological: Negative.    Psychiatric/Behavioral: Negative.      Objective:      Physical Exam   Constitutional: She is oriented to person, place, and time. She appears well-developed and well-nourished.   HENT:   Head: Normocephalic and atraumatic.   Neck: Normal range of motion. Neck supple.   Cardiovascular: Normal rate and regular rhythm.   Pulmonary/Chest: Effort normal and breath sounds normal.   Abdominal: Soft.   Musculoskeletal: Normal range of motion. She exhibits no edema.   Neurological: She is alert and oriented to person, place, and time.   Skin: Skin is warm and dry.   Psychiatric: She has a normal mood and affect.     Personal Diagnostic Review    CPAP download reviewed.   She uses 2 machines. 10cm H2O. " AHI 6    Assessment:       1. BASILIO on CPAP        Outpatient Encounter Medications as of 7/10/2019   Medication Sig Dispense Refill    atorvastatin (LIPITOR) 20 MG tablet TAKE 1 TABLET BY MOUTH EVERY DAY 90 tablet 3    ergocalciferol (ERGOCALCIFEROL) 50,000 unit Cap Take 1 capsule (50,000 Units total) by mouth every 7 days. 12 capsule 3    FLUZONE HIGH-DOSE 2018-19, PF, 180 mcg/0.5 mL vaccine       montelukast (SINGULAIR) 10 mg tablet Take 1 tablet (10 mg total) by mouth every evening. 90 tablet 3    salt moisturizing solution no1 (OCEAN ULTRA SALINE MIST) Mist 2 Pump by Nasal route 2 (two) times daily as needed. 90 mL 0    tamoxifen (NOLVADEX) 20 MG Tab TAKE 1 TABLET BY MOUTH EVERY DAY 90 tablet 0    VIT A/VIT C/VIT E/ZINC/COPPER (PRESERVISION AREDS ORAL) Take by mouth once daily.        No facility-administered encounter medications on file as of 7/10/2019.      Orders Placed This Encounter   Procedures    CPAP/BIPAP SUPPLIES     Order Specific Question:   Type of mask:     Answer:   Nasal     Order Specific Question:   Headgear?     Answer:   Yes     Order Specific Question:   Tubing?     Answer:   Yes     Order Specific Question:   Humidifier chamber?     Answer:   Yes     Order Specific Question:   Chin strap?     Answer:   Yes     Order Specific Question:   Filters?     Answer:   Yes     Order Specific Question:   Cushions?     Answer:   Yes     Order Specific Question:   Length of need (1-99 months):     Answer:   99     Plan:      Doing well on PAP settings. Follow up in 12 months with PAP data download or call earlier if any problems.    Problem List Items Addressed This Visit        Other    BASILIO on CPAP - Primary (Chronic)    Overview     CPAP 10cm         Relevant Orders    CPAP/BIPAP SUPPLIES

## 2019-08-14 DIAGNOSIS — C50.011 MALIGNANT NEOPLASM OF NIPPLE OF RIGHT BREAST IN FEMALE: ICD-10-CM

## 2019-08-14 RX ORDER — TAMOXIFEN CITRATE 20 MG/1
TABLET ORAL
Qty: 90 TABLET | Refills: 3 | Status: SHIPPED | OUTPATIENT
Start: 2019-08-14 | End: 2022-01-05

## 2019-09-19 ENCOUNTER — OFFICE VISIT (OUTPATIENT)
Dept: URGENT CARE | Facility: CLINIC | Age: 84
End: 2019-09-19
Payer: MEDICARE

## 2019-09-19 VITALS
HEIGHT: 58 IN | HEART RATE: 84 BPM | RESPIRATION RATE: 19 BRPM | SYSTOLIC BLOOD PRESSURE: 131 MMHG | BODY MASS INDEX: 29.6 KG/M2 | WEIGHT: 141 LBS | DIASTOLIC BLOOD PRESSURE: 81 MMHG | OXYGEN SATURATION: 95 % | TEMPERATURE: 98 F

## 2019-09-19 DIAGNOSIS — R42 DIZZINESS: Primary | ICD-10-CM

## 2019-09-19 PROCEDURE — 93005 ELECTROCARDIOGRAM TRACING: CPT | Mod: S$GLB,,, | Performed by: NURSE PRACTITIONER

## 2019-09-19 PROCEDURE — 1101F PR PT FALLS ASSESS DOC 0-1 FALLS W/OUT INJ PAST YR: ICD-10-PCS | Mod: CPTII,S$GLB,, | Performed by: NURSE PRACTITIONER

## 2019-09-19 PROCEDURE — 93010 ELECTROCARDIOGRAM REPORT: CPT | Mod: S$GLB,,, | Performed by: INTERNAL MEDICINE

## 2019-09-19 PROCEDURE — 99214 OFFICE O/P EST MOD 30 MIN: CPT | Mod: S$GLB,,, | Performed by: NURSE PRACTITIONER

## 2019-09-19 PROCEDURE — 93010 EKG 12-LEAD: ICD-10-PCS | Mod: S$GLB,,, | Performed by: INTERNAL MEDICINE

## 2019-09-19 PROCEDURE — 1101F PT FALLS ASSESS-DOCD LE1/YR: CPT | Mod: CPTII,S$GLB,, | Performed by: NURSE PRACTITIONER

## 2019-09-19 PROCEDURE — 99214 PR OFFICE/OUTPT VISIT, EST, LEVL IV, 30-39 MIN: ICD-10-PCS | Mod: S$GLB,,, | Performed by: NURSE PRACTITIONER

## 2019-09-19 PROCEDURE — 93005 EKG 12-LEAD: ICD-10-PCS | Mod: S$GLB,,, | Performed by: NURSE PRACTITIONER

## 2019-09-19 NOTE — PROGRESS NOTES
"Subjective:       Patient ID: Samantha Balderrama is a 89 y.o. female.    Vitals:  height is 4' 10" (1.473 m) and weight is 64 kg (141 lb). Her oral temperature is 98.3 °F (36.8 °C). Her blood pressure is 131/81 and her pulse is 84. Her respiration is 19 and oxygen saturation is 95%.     Chief Complaint: Dizziness        This is a 89-year-old female presents today with complaints dizziness after standing up quickly yesterday.  She states she was sitting down for a while and then got up, the dizziness only lasted a couple of seconds.  She states this has never happened in the past and wants to get herself checked out.  She states the dizziness has not returned since then and only had one episode.  Denies any associated headache, nausea vomiting, sweating.  Denies any visual changes, blurred vision or loss of vision.  Denies any ear pain or loss of hearing.  Denies any numbness tingling or weakness.  Denies any recent vomiting or diarrhea.  Denies recent fever/chills or poor oral intake.  Denies any chest pain, shortness of breath syncope or seizures.  Denies any new changes with her medications.  Patient states she is in otherwise good health.    Dizziness:   Chronicity:  New  Onset:  Yesterday  Progression since onset:  Resolved  Severity:  Mild  Duration:  Very brief  Dizziness characteristics:  Spacial disorientationno hearing loss, no ear congestion, no ear pain, no fever, no headaches, no tinnitus, no nausea, no vomiting, no diaphoresis, no aural fullness, no weakness, no visual disturbances, no light-headedness, no syncope, no palpitations, no panic, no facial weakness, no slurred speech, no numbness in extremities and no chest pain.  Aggravated by:  Nothing  Treatments tried:  Nothing  Improvements on treatment:  No relief      Constitution: Negative for chills, sweating, fatigue and fever.   HENT: Negative for ear pain, tinnitus, hearing loss, congestion and sore throat.    Neck: Negative for painful lymph " nodes.   Cardiovascular: Negative for chest pain, leg swelling, palpitations and passing out.   Eyes: Negative for double vision and blurred vision.   Respiratory: Negative for cough and shortness of breath.    Gastrointestinal: Negative for nausea, vomiting and diarrhea.   Genitourinary: Negative for dysuria, frequency, urgency and history of kidney stones.   Musculoskeletal: Negative for joint pain, joint swelling, muscle cramps and muscle ache.   Skin: Negative for color change, pale, rash and bruising.   Allergic/Immunologic: Negative for seasonal allergies.   Neurological: Positive for dizziness. Negative for history of vertigo, light-headedness, passing out and headaches.   Hematologic/Lymphatic: Negative for swollen lymph nodes.   Psychiatric/Behavioral: Negative for nervous/anxious, sleep disturbance and depression. The patient is not nervous/anxious.        Objective:      Physical Exam   Constitutional: She is oriented to person, place, and time. She appears well-developed and well-nourished. She is cooperative.  Non-toxic appearance. She does not appear ill. No distress.   HENT:   Head: Normocephalic and atraumatic.   Right Ear: Hearing, tympanic membrane, external ear and ear canal normal.   Left Ear: Hearing, tympanic membrane, external ear and ear canal normal.   Nose: Nose normal. No mucosal edema, rhinorrhea or nasal deformity. No epistaxis. Right sinus exhibits no maxillary sinus tenderness and no frontal sinus tenderness. Left sinus exhibits no maxillary sinus tenderness and no frontal sinus tenderness.   Mouth/Throat: Uvula is midline, oropharynx is clear and moist and mucous membranes are normal. No trismus in the jaw. Normal dentition. No uvula swelling. No posterior oropharyngeal erythema.   Eyes: Conjunctivae and lids are normal. Right eye exhibits no discharge. Left eye exhibits no discharge. No scleral icterus.   Sclera clear bilat   Neck: Trachea normal, normal range of motion, full passive  range of motion without pain and phonation normal. Neck supple.   Cardiovascular: Normal rate, regular rhythm, normal heart sounds, intact distal pulses and normal pulses.   Pulmonary/Chest: Effort normal and breath sounds normal. No respiratory distress.   Abdominal: Soft. Normal appearance and bowel sounds are normal. She exhibits no distension, no pulsatile midline mass and no mass. There is no tenderness.   Musculoskeletal: Normal range of motion. She exhibits no edema or deformity.   Neurological: She is alert and oriented to person, place, and time. She exhibits normal muscle tone. Coordination normal.   CN II-XII grossly intact.   Gait smooth and steady.   Speech is clear.   Follows all commands.   Rapid, alternating hand movements intact.   Finger to nose intact.   Heel to shin intact.   Face is symmetrical   Hearing intact.   Strength 5/5 to all 4 extremities.   Moves all 4 extremities equally.         Skin: Skin is warm, dry and intact. She is not diaphoretic. No pallor.   Psychiatric: She has a normal mood and affect. Her speech is normal and behavior is normal. Judgment and thought content normal. Cognition and memory are normal.   Nursing note and vitals reviewed.      EKG: VENT RATE 81 BPM, NSR, NO ECTOPY  ORTHOSTATICS NEGATIVE  Assessment:       1. Dizziness        Plan:       Discussed warning signs and symptoms with patient and when it is appropriate to seek emergent care.  Patient verbalized understanding and agrees to course of treatment.    Dizziness  -     Orthostatic vital signs  -     EKG 12-lead            Patient Instructions   Red Flag signs include; thunderclap headache, weakness, blurry vision or other sudden sensory deficit, new onset numbness or tingling, shortness of breath, chest pain, nausea, sweating or fatigue. Please go immediately to the Emergency Department with any of these signs.    You must understand that you've received an Urgent Care treatment only and that you may be  released before all your medical problems are known or treated. You, the patient, will arrange for follow up care as instructed.  If your condition worsens we recommend that you receive another evaluation at the emergency room immediately or contact your primary medical clinics after hours call service to discuss your concerns.  Please return here or go to the Emergency Department for any concerns or worsening of condition.        Possible Causes of Dizziness or Fainting    Dizziness and fainting can have many causes. Below are some examples of possible causes your healthcare provider will look to rule out.  Benign paroxysmal positional vertigo (BPPV)  BPPV results when calcium crystals inside the inner ear shift into the wrong position. BPPV causes episodes of vertigo (a spinning sensation). Episodes most often happen when the head is moved in a certain way. This is more common in people 65 and older.   Infection or inflammation  The semicircular canals of the ear may become infected or inflamed. In this case, they can send the wrong balance signals. This can cause vertigo.  Meniere disease  Meniere disease happens when there is too much fluid in the semicircular canals. This can cause vertigo. It also can cause hearing problems and buzzing or ringing in the ears (called tinnitus). You may also have a feeling of pressure or fullness in the ear.  Syncope  Syncope is fainting that happens when the brain doesnt get enough oxygen-rich blood. It can be caused by low heart rate or low blood pressure. This is called vasovagal syncope. It can also be caused by sitting or standing up too quickly. This is called orthostatic hypotension. Syncope may also be due to a heart valve problem, an abnormal heart rhythm, or other heart problems. Dizziness can also happen from stroke, hemorrhage in the brain, or other problems in the brain. Your healthcare provider may do certain tests to rule out these conditions.  Other  causes  Other causes include:  · Medicines. Certain medicines can cause dizziness and even fainting. In some cases, stopping a medicine too quickly can lead to withdrawal symptoms, including dizziness and fainting.  · Anxiety. Being anxious can lead to breathing changes, such as hyperventilation. These can lead to dizziness and fainting.  Additional causes for dizziness and fainting also exist. Talk to your healthcare provider for more information.     Date Last Reviewed: 10/6/2015  © 6610-2511 [employer]. [address] [place] [address], [place] [address]. All rights reserved. This information is not intended as a substitute for professional medical care. Always follow your healthcare professional's instructions.

## 2019-09-19 NOTE — PATIENT INSTRUCTIONS
Red Flag signs include; thunderclap headache, weakness, blurry vision or other sudden sensory deficit, new onset numbness or tingling, shortness of breath, chest pain, nausea, sweating or fatigue. Please go immediately to the Emergency Department with any of these signs.    You must understand that you've received an Urgent Care treatment only and that you may be released before all your medical problems are known or treated. You, the patient, will arrange for follow up care as instructed.  If your condition worsens we recommend that you receive another evaluation at the emergency room immediately or contact your primary medical clinics after hours call service to discuss your concerns.  Please return here or go to the Emergency Department for any concerns or worsening of condition.        Possible Causes of Dizziness or Fainting    Dizziness and fainting can have many causes. Below are some examples of possible causes your healthcare provider will look to rule out.  Benign paroxysmal positional vertigo (BPPV)  BPPV results when calcium crystals inside the inner ear shift into the wrong position. BPPV causes episodes of vertigo (a spinning sensation). Episodes most often happen when the head is moved in a certain way. This is more common in people 65 and older.   Infection or inflammation  The semicircular canals of the ear may become infected or inflamed. In this case, they can send the wrong balance signals. This can cause vertigo.  Meniere disease  Meniere disease happens when there is too much fluid in the semicircular canals. This can cause vertigo. It also can cause hearing problems and buzzing or ringing in the ears (called tinnitus). You may also have a feeling of pressure or fullness in the ear.  Syncope  Syncope is fainting that happens when the brain doesnt get enough oxygen-rich blood. It can be caused by low heart rate or low blood pressure. This is called vasovagal syncope. It can also be caused by  sitting or standing up too quickly. This is called orthostatic hypotension. Syncope may also be due to a heart valve problem, an abnormal heart rhythm, or other heart problems. Dizziness can also happen from stroke, hemorrhage in the brain, or other problems in the brain. Your healthcare provider may do certain tests to rule out these conditions.  Other causes  Other causes include:  · Medicines. Certain medicines can cause dizziness and even fainting. In some cases, stopping a medicine too quickly can lead to withdrawal symptoms, including dizziness and fainting.  · Anxiety. Being anxious can lead to breathing changes, such as hyperventilation. These can lead to dizziness and fainting.  Additional causes for dizziness and fainting also exist. Talk to your healthcare provider for more information.     Date Last Reviewed: 10/6/2015  © 1362-1279 [employer]. [address] [place] [address], [place] [address]. All rights reserved. This information is not intended as a substitute for professional medical care. Always follow your healthcare professional's instructions.

## 2020-01-24 ENCOUNTER — OFFICE VISIT (OUTPATIENT)
Dept: URGENT CARE | Facility: CLINIC | Age: 85
End: 2020-01-24
Payer: MEDICARE

## 2020-01-24 VITALS
OXYGEN SATURATION: 96 % | HEIGHT: 58 IN | BODY MASS INDEX: 29.6 KG/M2 | TEMPERATURE: 98 F | SYSTOLIC BLOOD PRESSURE: 120 MMHG | WEIGHT: 141 LBS | DIASTOLIC BLOOD PRESSURE: 78 MMHG | HEART RATE: 94 BPM

## 2020-01-24 DIAGNOSIS — H61.21 IMPACTED CERUMEN OF RIGHT EAR: Primary | ICD-10-CM

## 2020-01-24 DIAGNOSIS — J30.9 ALLERGIC RHINITIS, UNSPECIFIED SEASONALITY, UNSPECIFIED TRIGGER: ICD-10-CM

## 2020-01-24 PROCEDURE — 69209 REMOVE IMPACTED EAR WAX UNI: CPT | Mod: RT,S$GLB,, | Performed by: PHYSICIAN ASSISTANT

## 2020-01-24 PROCEDURE — 99214 PR OFFICE/OUTPT VISIT, EST, LEVL IV, 30-39 MIN: ICD-10-PCS | Mod: 25,S$GLB,, | Performed by: PHYSICIAN ASSISTANT

## 2020-01-24 PROCEDURE — 99214 OFFICE O/P EST MOD 30 MIN: CPT | Mod: 25,S$GLB,, | Performed by: PHYSICIAN ASSISTANT

## 2020-01-24 PROCEDURE — 69209 EAR CERUMEN REMOVAL: ICD-10-PCS | Mod: RT,S$GLB,, | Performed by: PHYSICIAN ASSISTANT

## 2020-01-24 RX ORDER — FLUTICASONE PROPIONATE 50 MCG
1 SPRAY, SUSPENSION (ML) NASAL DAILY
Qty: 1 BOTTLE | Refills: 0 | Status: ON HOLD | OUTPATIENT
Start: 2020-01-24 | End: 2021-02-03 | Stop reason: HOSPADM

## 2020-01-24 NOTE — PATIENT INSTRUCTIONS
Saline nasal spray  Blow nose then use flonase  Can use over the counter antihistamine for symptoms  Follow up with primary care in 1 week    Please drink plenty of fluids.  Please get plenty of rest.  Please return here or go to the Emergency Department for any concerns or worsening of condition.  If you were given wait & see antibiotics, please wait 3-5 days before taking them, and only take them if your symptoms have worsened or not improved.  If you do begin taking the antibiotics, please take them to completion.  If you were prescribed antibiotics, please take them to completion.  If you were prescribed a narcotic medication, do not drive or operate heavy equipment or machinery while taking these medications.  If you do not have Hypertension or any history of palpitations, it is ok to take over the counter Sudafed or Mucinex D or Allegra-D or Claritin-D or Zyrtec-D.  If you do take one of the above, it is ok to combine that with plain over the counter Mucinex or Allegra or Claritin or Zyrtec.  If for example you are taking Zyrtec -D, you can combine that with Mucinex, but not Mucinex-D.  If you are taking Mucinex-D, you can combine that with plain Allegra or Claritin or Zyrtec.   If you do have Hypertension or palpitations, it is safe to take Coricidin HBP for relief of sinus symptoms.  We recommend you take over the counter Flonase (Fluticasone) or another nasally inhaled steroid unless you are already taking one.  Nasal irrigation with a saline spray or Netti Pot like device per their directions is also recommended.  If not allergic, please take over the counter Tylenol (Acetaminophen) and/or Motrin (Ibuprofen) as directed for control of pain and/or fever.  Please follow up with your primary care doctor or specialist as needed.    If you  smoke, please stop smoking.            Allergic Rhinitis  Allergic rhinitis is an allergic reaction that affects the nose, and often the eyes. Its often known as nasal  allergies. Nasal allergies are often due to things in the environment that are breathed in. Depending what you are sensitive to, nasal allergies may occur only during certain seasons. Or they may occur year round. Common indoor allergens include house dust mites, mold, cockroaches, and pet dander. Outdoor allergens include pollen from trees, grasses, and weeds.   Symptoms include a drippy, stuffy, and itchy nose. They also include sneezing and red and itchy eyes. You may feel tired more often. Severe allergies may also affect your breathing and trigger a condition called asthma.   Tests can be done to see what allergens are affecting you. You may be referred to an allergy specialist for testing and further evaluation.  Home care  Your healthcare provider may prescribe medicines to help relieve allergy symptoms. These may include oral medicines, nasal sprays, or eye drops.  Ask your provider for advice on how to avoid substances that you are allergic to. Below are a few tips for each type of allergen.  Pet dander:  · Do not have pets with fur and feathers.  · If you can't avoid having a pet, keep it out of your bedroom and off upholstered furniture.  Pollen:  · When pollen counts are high, keep windows of your car and home closed. If possible, use an air conditioner instead.  · Wear a filter mask when mowing or doing yard work.  House dust mites:  · Wash bedding every week in warm water and detergent and dry on a hot setting.  · Cover the mattress, box spring, and pillows with allergy covers.   · If possible, sleep in a room with no carpet, curtains, or upholstered furniture.  Cockroaches:  · Store food in sealed containers.  · Remove garbage from the home promptly.  · Fix water leaks  Mold:  · Keep humidity low by using a dehumidifier or air conditioner. Keep the dehumidifier and air conditioner clean and free of mold.  · Clean moldy areas with bleach and water.  In general:  · Vacuum once or twice a week. If  possible, use a vacuum with a high-efficiency particulate air (HEPA) filter.  · Do not smoke. Avoid cigarette smoke. Cigarette smoke is an irritant that can make symptoms worse.  Follow-up care  Follow up as advised by the healthcare provider or our staff. If you were referred to an allergy specialist, make this appointment promptly.  When to seek medical advice  Call your healthcare provider right away if the following occur:  · Coughing or wheezing  · Fever greater than 100.4°F (38°C)  · Hives (raised red bumps)  · Continuing symptoms, new symptoms, or worsening symptoms  Call 911 right away if you have:  · Trouble breathing  · Severe swelling of the face or severe itching of the eyes or mouth  Date Last Reviewed: 3/1/2017  © 2815-5663 [employer]. [address] [place] [address], [place] [address]. All rights reserved. This information is not intended as a substitute for professional medical care. Always follow your healthcare professional's instructions.

## 2020-01-24 NOTE — PROGRESS NOTES
"Subjective:       Patient ID: Samantha Balderrama is a 89 y.o. female.    Vitals:  height is 4' 10" (1.473 m) and weight is 64 kg (141 lb). Her temperature is 97.8 °F (36.6 °C). Her blood pressure is 120/78 and her pulse is 94. Her oxygen saturation is 96%.     Chief Complaint: Otalgia    This is a 89-year-old female presents complaining of pain in bilateral ears at started yesterday.  Worsened right ear.  States that is mild.  Having trouble hearing out of right ear.  States that she has also felt stuffy since yesterday.  Denies any fevers, chills, sweats, cough, nausea, vomiting.    Otalgia    There is pain in both ears. This is a new problem. The current episode started yesterday. The problem occurs constantly. The problem has been gradually worsening. There has been no fever. The pain is at a severity of 5/10. The pain is moderate. Pertinent negatives include no coughing, diarrhea, headaches, rash, sore throat or vomiting. She has tried nothing for the symptoms. The treatment provided no relief. Her past medical history is significant for hearing loss. bilateral hearing aides       Constitution: Negative for chills, fatigue and fever.   HENT: Positive for ear pain. Negative for congestion and sore throat.    Neck: Negative for painful lymph nodes.   Cardiovascular: Negative for chest pain and leg swelling.   Eyes: Negative for double vision and blurred vision.   Respiratory: Negative for cough and shortness of breath.    Gastrointestinal: Negative for nausea, vomiting and diarrhea.   Genitourinary: Negative for dysuria, frequency, urgency and history of kidney stones.   Musculoskeletal: Negative for joint pain, joint swelling, muscle cramps and muscle ache.   Skin: Negative for color change, pale, rash and bruising.   Allergic/Immunologic: Negative for seasonal allergies.   Neurological: Negative for dizziness, history of vertigo, light-headedness, passing out and headaches.   Hematologic/Lymphatic: Negative for " swollen lymph nodes.   Psychiatric/Behavioral: Negative for nervous/anxious, sleep disturbance and depression. The patient is not nervous/anxious.        Objective:      Physical Exam   Constitutional: She is oriented to person, place, and time. She appears well-developed and well-nourished. She is cooperative.  Non-toxic appearance. She does not have a sickly appearance. She does not appear ill. No distress.   HENT:   Head: Normocephalic and atraumatic.   Right Ear: Hearing, tympanic membrane, external ear and ear canal normal. There is cerumen present.   Left Ear: Hearing, tympanic membrane, external ear and ear canal normal.   Nose: Nose normal. No mucosal edema, rhinorrhea or nasal deformity. No epistaxis. Right sinus exhibits no maxillary sinus tenderness and no frontal sinus tenderness. Left sinus exhibits no maxillary sinus tenderness and no frontal sinus tenderness.   Mouth/Throat: Uvula is midline, oropharynx is clear and moist and mucous membranes are normal. No trismus in the jaw. Normal dentition. No uvula swelling. No oropharyngeal exudate, posterior oropharyngeal edema or posterior oropharyngeal erythema.   Cerumen removed from right ear, TM clear after removal   Eyes: Conjunctivae and lids are normal. No scleral icterus.   Neck: Trachea normal, full passive range of motion without pain and phonation normal. Neck supple. No neck rigidity. No edema and no erythema present.   Cardiovascular: Normal rate, regular rhythm, normal heart sounds, intact distal pulses and normal pulses.   Pulmonary/Chest: Effort normal and breath sounds normal. No respiratory distress. She has no decreased breath sounds. She has no rhonchi.   Abdominal: Normal appearance.   Musculoskeletal: Normal range of motion. She exhibits no edema or deformity.   Neurological: She is alert and oriented to person, place, and time. She exhibits normal muscle tone. Coordination normal.   Skin: Skin is warm, dry, intact, not diaphoretic and  not pale.   Psychiatric: She has a normal mood and affect. Her speech is normal and behavior is normal. Judgment and thought content normal. Cognition and memory are normal.   Nursing note and vitals reviewed.        Assessment:       1. Impacted cerumen of right ear    2. Allergic rhinitis, unspecified seasonality, unspecified trigger        Plan:       Cerumen removed in clinic, pt asymptomatic after removal and feeling much better    Labs reviewed, pertinent imaging reviewed, previous medical records, medical history, surgical history, social history, family history reviewed.      Impacted cerumen of right ear  -     Ear Cerumen Removal    Allergic rhinitis, unspecified seasonality, unspecified trigger  -     sodium chloride (SALINE NASAL) 0.65 % nasal spray; 1 spray by Nasal route as needed for Congestion.; Refill: 12  -     fluticasone propionate (FLONASE) 50 mcg/actuation nasal spray; 1 spray (50 mcg total) by Each Nostril route once daily.  Dispense: 1 Bottle; Refill: 0         Patient Instructions   Saline nasal spray  Blow nose then use flonase  Can use over the counter antihistamine for symptoms  Follow up with primary care in 1 week    Please drink plenty of fluids.  Please get plenty of rest.  Please return here or go to the Emergency Department for any concerns or worsening of condition.  If you were given wait & see antibiotics, please wait 3-5 days before taking them, and only take them if your symptoms have worsened or not improved.  If you do begin taking the antibiotics, please take them to completion.  If you were prescribed antibiotics, please take them to completion.  If you were prescribed a narcotic medication, do not drive or operate heavy equipment or machinery while taking these medications.  If you do not have Hypertension or any history of palpitations, it is ok to take over the counter Sudafed or Mucinex D or Allegra-D or Claritin-D or Zyrtec-D.  If you do take one of the above, it is ok  to combine that with plain over the counter Mucinex or Allegra or Claritin or Zyrtec.  If for example you are taking Zyrtec -D, you can combine that with Mucinex, but not Mucinex-D.  If you are taking Mucinex-D, you can combine that with plain Allegra or Claritin or Zyrtec.   If you do have Hypertension or palpitations, it is safe to take Coricidin HBP for relief of sinus symptoms.  We recommend you take over the counter Flonase (Fluticasone) or another nasally inhaled steroid unless you are already taking one.  Nasal irrigation with a saline spray or Netti Pot like device per their directions is also recommended.  If not allergic, please take over the counter Tylenol (Acetaminophen) and/or Motrin (Ibuprofen) as directed for control of pain and/or fever.  Please follow up with your primary care doctor or specialist as needed.    If you  smoke, please stop smoking.            Allergic Rhinitis  Allergic rhinitis is an allergic reaction that affects the nose, and often the eyes. Its often known as nasal allergies. Nasal allergies are often due to things in the environment that are breathed in. Depending what you are sensitive to, nasal allergies may occur only during certain seasons. Or they may occur year round. Common indoor allergens include house dust mites, mold, cockroaches, and pet dander. Outdoor allergens include pollen from trees, grasses, and weeds.   Symptoms include a drippy, stuffy, and itchy nose. They also include sneezing and red and itchy eyes. You may feel tired more often. Severe allergies may also affect your breathing and trigger a condition called asthma.   Tests can be done to see what allergens are affecting you. You may be referred to an allergy specialist for testing and further evaluation.  Home care  Your healthcare provider may prescribe medicines to help relieve allergy symptoms. These may include oral medicines, nasal sprays, or eye drops.  Ask your provider for advice on how to avoid  substances that you are allergic to. Below are a few tips for each type of allergen.  Pet dander:  · Do not have pets with fur and feathers.  · If you can't avoid having a pet, keep it out of your bedroom and off upholstered furniture.  Pollen:  · When pollen counts are high, keep windows of your car and home closed. If possible, use an air conditioner instead.  · Wear a filter mask when mowing or doing yard work.  House dust mites:  · Wash bedding every week in warm water and detergent and dry on a hot setting.  · Cover the mattress, box spring, and pillows with allergy covers.   · If possible, sleep in a room with no carpet, curtains, or upholstered furniture.  Cockroaches:  · Store food in sealed containers.  · Remove garbage from the home promptly.  · Fix water leaks  Mold:  · Keep humidity low by using a dehumidifier or air conditioner. Keep the dehumidifier and air conditioner clean and free of mold.  · Clean moldy areas with bleach and water.  In general:  · Vacuum once or twice a week. If possible, use a vacuum with a high-efficiency particulate air (HEPA) filter.  · Do not smoke. Avoid cigarette smoke. Cigarette smoke is an irritant that can make symptoms worse.  Follow-up care  Follow up as advised by the healthcare provider or our staff. If you were referred to an allergy specialist, make this appointment promptly.  When to seek medical advice  Call your healthcare provider right away if the following occur:  · Coughing or wheezing  · Fever greater than 100.4°F (38°C)  · Hives (raised red bumps)  · Continuing symptoms, new symptoms, or worsening symptoms  Call 911 right away if you have:  · Trouble breathing  · Severe swelling of the face or severe itching of the eyes or mouth  Date Last Reviewed: 3/1/2017  © 7962-6072 [employer]. [address] [place] [address], [place] [address]. All rights reserved. This information is not intended as a substitute for professional medical care. Always follow your  healthcare professional's instructions.

## 2020-02-08 DIAGNOSIS — R06.7 SNEEZING: ICD-10-CM

## 2020-02-08 DIAGNOSIS — R09.89 RUNNY NOSE: ICD-10-CM

## 2020-02-08 DIAGNOSIS — R05.9 COUGH: ICD-10-CM

## 2020-02-08 DIAGNOSIS — R09.81 SINUS CONGESTION: ICD-10-CM

## 2020-02-10 RX ORDER — MONTELUKAST SODIUM 10 MG/1
TABLET ORAL
Qty: 90 TABLET | Refills: 3 | Status: SHIPPED | OUTPATIENT
Start: 2020-02-10 | End: 2021-02-18 | Stop reason: SDUPTHER

## 2020-02-10 NOTE — PROGRESS NOTES
ONCOLOGIST: JACQUELINE Toney MD    CC: 89-year-old female with a history of bilateral breast cancer with metastatic disease to the lung status post lung resection, bilateral mastectomies, and radiation therapy/ chemo in 1985. She has been on long term Tamoxifen with no adverse side effects. She has been followed every 6 mons with clinical exams, labs and chest x-rays.     Pt has dips in her platelet counts intermittently dating back to Sept 2012. Saw Dr. Toney 9/25/15- no further recommendations- just follow cbc. Dr. Toney would like for her to remain on Tamoxifen indefinitely. Had gyn exam with Dr. oCronado on 10/7/15.     Pt is seen every 6 mon chest x-ray and chest wall exam. Lab review.     Pt reports recent trip to [place] and returning 13 days ago with an URI- saw urgent care outside of [facility] and was placed on 3 days of steroids, Cough syrup and flonase- states ran temp of 101 at first and has not had fever for several days- feeling better- has dry cough and fatigue- no wheezing. Appetite good- admits not drinking enough fluids. Denies any SOB or chest pain.     ROS: Denies sob, chest pain, weakness or fatigue, dizziness, nausea, wt loss or gain. No sore throat. +sinus congestion, + dry cough. No recent fever or night sweats. No itchy eyes. No lela aches or pains out of the ordinary. No change in bowel or bladder function or character. No chest wall lumps. No appetite changes. No vaginal discharge or bleeding. No lower extremity edema or pain. Has hearing aids and hears very well. No gingival bleeding, nose bleeds or unusual bruising. Not walking- admits needs to get back in to it. States feels better.  Balance of ROS is negative otherwise.     PAST MEDICAL HISTORY: Arthritis, obstructive sleep apnea, bilateral breast cancer with metastatic disease to lung s/p resection, osteoporosis, thrombocytopenia.     PAST SURGICAL HISTORY: Bilateral mastectomies. Lung resection.     FAMILY HISTORY: Sisters with  hypertension, father and mother with heart disease, sister with asthma, sister with breast cancer, aunt with colon cancer. Niece in mid 30's breast cancer. There is also a family history of osteoporosis.     SOCIAL HISTORY: She is single, never , no children. Does not smoke and reports alcohol usage of 1 to 2 per week, caffeine 4 to 5 per day. Denies any regular exercise program. 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 Appears younger than her stated age. Well-nourished and developed.  HEAD: Atraumatic and normocephalic. Pupils equal round reactive to light. Conjunctiva is non-icteric. Oral mucosa pink and moist with no posterior erythema. Nasal passages patent and pale/boggy in appearance.  No visible blood and nasal passages are in the back of her throat.  Bilateral tympanic membranes noted with no redness or swelling.  NECK: Soft, supple, symmetric. No thyromegaly. No bruit garett carotids.   LYMPHATICS: There is no cervical, supraclavicular, infraclavicular or axillary adenopathy.  CV: Regular rate rhythm with no murmurs or gallops.  CHEST: Clear to auscultation. Unlabored respiratory effort.  CHEST WALL: Breasts are surgically absent. There is no visible rash, lump, erythema, dimpling, or architectural distortion over bilateral chest walls. No palpable abnormality appreciated over the chest walls.  ABDOMEN: Soft, nontender, no hepatosplenomegaly.  SKIN: Warm and dry to touch. Skin turgor within normal limits.    EXTREMITIES: There is no clubbing, cyanosis, or edema. No evidence of lymphedema in the upper extremities.  PSYCH: Affect is appropriate and pleasant.    CHEST X-ray - stable  Last colonoscopy 2/2005 - had polyp excised. Was recommended by Dr. Pierre to have 5 year follow-up. This was ordered and Gastro decided due to her age she would be ok not to have the scope.   LABS:  Results for JENNY CAMERON (MRN [id number]) as of  2/12/2020 14:46   Ref. Range 7/2/2019 09:14 2/12/2020 13:58   WBC Latest Ref Range: 3.90 - 12.70 K/uL 6.54 11.25   RBC Latest Ref Range: 4.00 - 5.40 M/uL 4.92 4.41   Hemoglobin Latest Ref Range: 12.0 - 16.0 g/dL 13.7 12.3   Hematocrit Latest Ref Range: 37.0 - 48.5 % 42.8 38.8   MCV Latest Ref Range: 82 - 98 fL 87 88   MCH Latest Ref Range: 27.0 - 31.0 pg 27.8 27.9   MCHC Latest Ref Range: 32.0 - 36.0 g/dL 32.0 31.7 (L)   RDW Latest Ref Range: 11.5 - 14.5 % 14.0 14.6 (H)   Platelets Latest Ref Range: 150 - 350 K/uL 168 384 (H)   MPV Latest Ref Range: 9.2 - 12.9 fL 9.2 9.0 (L)   Gran% Latest Ref Range: 38.0 - 73.0 % 67.1 79.7 (H)   Gran # (ANC) Latest Ref Range: 1.8 - 7.7 K/uL 4.4 9.0 (H)   Lymph% Latest Ref Range: 18.0 - 48.0 % 19.1 12.0 (L)   Lymph # Latest Ref Range: 1.0 - 4.8 K/uL 1.3 1.4   Mono% Latest Ref Range: 4.0 - 15.0 % 11.2 7.2   Mono # Latest Ref Range: 0.3 - 1.0 K/uL 0.7 0.8   Eosinophil% Latest Ref Range: 0.0 - 8.0 % 1.8 0.7   Eos # Latest Ref Range: 0.0 - 0.5 K/uL 0.1 0.1   Basophil% Latest Ref Range: 0.0 - 1.9 % 0.8 0.4   Baso # Latest Ref Range: 0.00 - 0.20 K/uL 0.05 0.05   nRBC Latest Ref Range: 0 /100 WBC 0 0   Differential Method Unknown Automated Automated   Immature Grans (Abs) Latest Ref Range: 0.00 - 0.04 K/uL 0.02 0.08 (H)   Immature Granulocytes Latest Ref Range: 0.0 - 0.5 % 0.3 0.7 (H)   Results for JENNY CAMERON (MRN [id number]) as of 2/12/2020 14:46   Ref. Range 7/2/2019 09:14 2/12/2020 13:58   Sodium Latest Ref Range: 136 - 145 mmol/L 140 141   Potassium Latest Ref Range: 3.5 - 5.1 mmol/L 4.3 4.6   Chloride Latest Ref Range: 95 - 110 mmol/L 106 104   CO2 Latest Ref Range: 23 - 29 mmol/L 27 29   Anion Gap Latest Ref Range: 8 - 16 mmol/L 7 (L) 8   BUN, Bld Latest Ref Range: 10 - 30 mg/dL 13 23   Creatinine Latest Ref Range: 0.5 - 1.4 mg/dL 0.7 0.7   eGFR if non African American Latest Ref Range: >60 mL/min/1.73 m^2 >60 >60   eGFR if  Latest Ref Range: >60 mL/min/1.73 m^2  >60 >60   Glucose Latest Ref Range: 70 - 110 mg/dL 107 97   Calcium Latest Ref Range: 8.7 - 10.5 mg/dL 9.6 9.6   Alkaline Phosphatase Latest Ref Range: 55 - 135 U/L 58 50 (L)   PROTEIN TOTAL Latest Ref Range: 6.0 - 8.4 g/dL 7.7 8.1   Albumin Latest Ref Range: 3.5 - 5.2 g/dL 3.9 3.4 (L)   BILIRUBIN TOTAL Latest Ref Range: 0.1 - 1.0 mg/dL 0.6 0.4   AST Latest Ref Range: 10 - 40 U/L 17 14   ALT Latest Ref Range: 10 - 44 U/L 18 10   Triglycerides Latest Ref Range: 30 - 150 mg/dL 145    Cholesterol Latest Ref Range: 120 - 199 mg/dL 212 (H)    HDL Latest Ref Range: 40 - 75 mg/dL 51    Hdl/Cholesterol Ratio Latest Ref Range: 20.0 - 50.0 % 24.1    LDL Cholesterol External Latest Ref Range: 63.0 - 159.0 mg/dL 132.0    Non-HDL Cholesterol Latest Units: mg/dL 161    Total Cholesterol/HDL Ratio Latest Ref Range: 2.0 - 5.0  4.2      ASSESSMENT:  1. Bilateral breast cancer with metastatic disease to the lung. Status post bilateral mastectomies and lung resection. Status post chemotherapy and chest wall radiation and on long-term tamoxifen.  2. Sleep apnea. Using CPAP without difficulty  3. Health maintenance. Colonoscopy canceled by gastro - decided not needed  4. Hearing loss corrected with bilateral hearing aides  5. Thrombocytopenia intermittent and relatively stable-   6. CXR - wnl  7.  Labs abn most likely related to recent steroid use    PLAN:  1.  RTC 6 weeks for recheck of cbc, tessalon perles for dry cough. flonase daily, push fluids, rest- if begin to have temp over 100 call me or if cough does not improve  2.  in August 2020 rtc for PA and lateral chest x-ray, CBC and chem 20  2. B12 sublinguinal daily.   3. Patient was asked to perform monthly chest wall examinations and was instructed on what to look for during the exam. Continue to take Tamoxifen as directed by Dr. Toney  4. Patient encouraged to get back into her walking and she agrees.  Wt loss?    Clarice Lombardi, RN, MSN, NP-C

## 2020-02-12 ENCOUNTER — HOSPITAL ENCOUNTER (OUTPATIENT)
Dept: RADIOLOGY | Facility: HOSPITAL | Age: 85
Discharge: HOME | End: 2020-02-12
Attending: NURSE PRACTITIONER
Payer: MEDICARE

## 2020-02-12 ENCOUNTER — OFFICE VISIT (OUTPATIENT)
Dept: HEMATOLOGY/ONCOLOGY | Facility: CLINIC | Age: 85
End: 2020-02-12
Payer: MEDICARE

## 2020-02-12 VITALS
TEMPERATURE: 97 F | BODY MASS INDEX: 30.46 KG/M2 | WEIGHT: 145.75 LBS | SYSTOLIC BLOOD PRESSURE: 103 MMHG | HEART RATE: 106 BPM | DIASTOLIC BLOOD PRESSURE: 69 MMHG

## 2020-02-12 DIAGNOSIS — Z85.89 HISTORY OF SECONDARY LUNG CANCER: ICD-10-CM

## 2020-02-12 DIAGNOSIS — R79.89 ABNORMAL CBC: ICD-10-CM

## 2020-02-12 DIAGNOSIS — Z85.3 HISTORY OF BILATERAL BREAST CANCER: Primary | ICD-10-CM

## 2020-02-12 DIAGNOSIS — J98.01 COUGH DUE TO BRONCHOSPASM: ICD-10-CM

## 2020-02-12 DIAGNOSIS — Z85.3 HISTORY OF BILATERAL BREAST CANCER: ICD-10-CM

## 2020-02-12 PROCEDURE — 71046 X-RAY EXAM CHEST 2 VIEWS: CPT | Mod: TC,HCNC

## 2020-02-12 PROCEDURE — 99214 PR OFFICE/OUTPT VISIT, EST, LEVL IV, 30-39 MIN: ICD-10-PCS | Mod: HCNC,S$GLB,, | Performed by: NURSE PRACTITIONER

## 2020-02-12 PROCEDURE — 99214 OFFICE O/P EST MOD 30 MIN: CPT | Mod: HCNC,S$GLB,, | Performed by: NURSE PRACTITIONER

## 2020-02-12 PROCEDURE — 71046 XR CHEST PA AND LATERAL: ICD-10-PCS | Mod: 26,HCNC,, | Performed by: RADIOLOGY

## 2020-02-12 PROCEDURE — 1126F PR PAIN SEVERITY QUANTIFIED, NO PAIN PRESENT: ICD-10-PCS | Mod: HCNC,S$GLB,, | Performed by: NURSE PRACTITIONER

## 2020-02-12 PROCEDURE — 99999 PR PBB SHADOW E&M-EST. PATIENT-LVL IV: ICD-10-PCS | Mod: PBBFAC,HCNC,, | Performed by: NURSE PRACTITIONER

## 2020-02-12 PROCEDURE — 1101F PR PT FALLS ASSESS DOC 0-1 FALLS W/OUT INJ PAST YR: ICD-10-PCS | Mod: HCNC,CPTII,S$GLB, | Performed by: NURSE PRACTITIONER

## 2020-02-12 PROCEDURE — 99999 PR PBB SHADOW E&M-EST. PATIENT-LVL IV: CPT | Mod: PBBFAC,HCNC,, | Performed by: NURSE PRACTITIONER

## 2020-02-12 PROCEDURE — 99499 UNLISTED E&M SERVICE: CPT | Mod: HCNC,S$GLB,, | Performed by: NURSE PRACTITIONER

## 2020-02-12 PROCEDURE — 1101F PT FALLS ASSESS-DOCD LE1/YR: CPT | Mod: HCNC,CPTII,S$GLB, | Performed by: NURSE PRACTITIONER

## 2020-02-12 PROCEDURE — 71046 X-RAY EXAM CHEST 2 VIEWS: CPT | Mod: 26,HCNC,, | Performed by: RADIOLOGY

## 2020-02-12 PROCEDURE — 1159F MED LIST DOCD IN RCRD: CPT | Mod: HCNC,S$GLB,, | Performed by: NURSE PRACTITIONER

## 2020-02-12 PROCEDURE — 1159F PR MEDICATION LIST DOCUMENTED IN MEDICAL RECORD: ICD-10-PCS | Mod: HCNC,S$GLB,, | Performed by: NURSE PRACTITIONER

## 2020-02-12 PROCEDURE — 99499 RISK ADDL DX/OHS AUDIT: ICD-10-PCS | Mod: HCNC,S$GLB,, | Performed by: NURSE PRACTITIONER

## 2020-02-12 PROCEDURE — 1126F AMNT PAIN NOTED NONE PRSNT: CPT | Mod: HCNC,S$GLB,, | Performed by: NURSE PRACTITIONER

## 2020-02-12 RX ORDER — PREDNISONE 20 MG/1
TABLET ORAL
Status: ON HOLD | COMMUNITY
Start: 2020-02-01 | End: 2021-02-03 | Stop reason: HOSPADM

## 2020-02-12 RX ORDER — BENZONATATE 200 MG/1
CAPSULE ORAL
COMMUNITY
Start: 2020-02-01 | End: 2020-02-12 | Stop reason: SDUPTHER

## 2020-02-12 RX ORDER — TAMOXIFEN CITRATE 20 MG/1
TABLET ORAL
Status: ON HOLD | COMMUNITY
Start: 2019-08-14 | End: 2021-02-03 | Stop reason: HOSPADM

## 2020-02-12 RX ORDER — DEXCHLORPHENIRAMINE MALEATE, DEXTROMETHORPHAN HBR, PHENYLEPHRINE HCL 1; 10; 5 MG/5ML; MG/5ML; MG/5ML
SYRUP ORAL
Status: ON HOLD | COMMUNITY
Start: 2020-02-01 | End: 2021-02-03 | Stop reason: HOSPADM

## 2020-02-12 RX ORDER — MONTELUKAST SODIUM 10 MG/1
TABLET ORAL
Status: ON HOLD | COMMUNITY
Start: 2019-08-15 | End: 2021-02-03 | Stop reason: HOSPADM

## 2020-02-12 RX ORDER — BENZONATATE 200 MG/1
200 CAPSULE ORAL 3 TIMES DAILY PRN
Qty: 30 CAPSULE | Refills: 1 | Status: SHIPPED | OUTPATIENT
Start: 2020-02-12 | End: 2021-02-24 | Stop reason: SDUPTHER

## 2020-06-01 NOTE — PROGRESS NOTES
ONCOLOGIST: JACQUELINE Toney MD    CC: 89-year-old female with a history of bilateral breast cancer with metastatic disease to the lung status post lung resection, bilateral mastectomies, and radiation therapy/ chemo in 1985. She has been on long term Tamoxifen with no adverse side effects. She has been followed every 6 mons with clinical exams, labs and chest x-rays.     Pt has dips in her platelet counts intermittently dating back to Sept 2012. Saw Dr. Toney 9/25/15- no further recommendations- just follow cbc. Dr. Toney would like for her to remain on Tamoxifen indefinitely. Had gyn exam with Dr. Coronado on 10/7/15.     Pt is seen every 6 mon chest x-ray and chest wall exam. Lab review.     One week ago noted when bends head down experiences dizziness that lasts a few seconds. Only when bending down. Denies with standing or turning head. No associated nausea or headache. No sinus pressure over frontal or maxillary areas. No cough- states clears her voice constantly- has clear drainage from nasal passages most days and intermittent blood tinged drainage. Using flonase daily - not using antihistamine. Using sudafed intermittently as well    No pharyngitis. No ear pain. Has garett hearing aides with full feeling in left occasionally.    ROS: Denies sob, chest pain, weakness or fatigue, nausea, wt loss or gain. No sore throat.  No recent fever or night sweats. No itchy eyes. No lela aches or pains out of the ordinary. No change in bowel or bladder function or character. No chest wall lumps. No appetite changes. No vaginal discharge or bleeding. No lower extremity edema or pain. Has hearing aids and hears very well. No gingival bleeding or unusual bruising. Not walking- admits needs to get back in to it.   Balance of ROS is negative otherwise.     PAST MEDICAL HISTORY: Arthritis, obstructive sleep apnea, bilateral breast cancer with metastatic disease to lung s/p resection, osteoporosis, thrombocytopenia.     PAST  SURGICAL HISTORY: Bilateral mastectomies. Lung resection.     FAMILY HISTORY: Sisters with hypertension, father and mother with heart disease, sister with asthma, sister with breast cancer, aunt with colon cancer. Niece in mid 30's breast cancer. There is also a family history of osteoporosis.     SOCIAL HISTORY: She is single, never , no children. Does not smoke and reports alcohol usage of 1 to 2 per week, caffeine 4 to 5 per day. Denies any regular exercise program. Works or volunteers several times a month with [employer]     PE:   GENERAL: Awake, alert, adult female. Appears younger than her stated age. Well-nourished and developed.  HEAD: Atraumatic and normocephalic. Pupils equal round reactive to light. Conjunctiva is non-icteric. Oral mucosa pink and moist with no posterior erythema. Nasal passages patent and pale/boggy in appearance.  No visible blood and nasal passages are in the back of her throat. Bilateral tympanic membranes noted with no redness or swelling. Cloudy- more cerumen left than right  NECK: Soft, supple, symmetric. No thyromegaly. No bruit garett carotids.   LYMPHATICS: There is no cervical, supraclavicular, infraclavicular or axillary adenopathy.  CV: Regular rate rhythm with no murmurs or gallops.  CHEST: Clear to auscultation. Unlabored respiratory effort.  CHEST WALL: Breasts are surgically absent. There is no visible rash, lump, erythema, dimpling, or architectural distortion over bilateral chest walls. No palpable abnormality appreciated over the chest walls.  ABDOMEN: Soft, nontender, no hepatosplenomegaly.  SKIN: Warm and dry to touch. Skin turgor within normal limits.    EXTREMITIES: There is no clubbing, cyanosis, or edema. No evidence of lymphedema in the upper extremities.  PSYCH: Affect is appropriate and pleasant.  Neuro- CN 2-12 wnl    CHEST X-ray - stable in Feb  Last colonoscopy 2/2005 - had polyp excised. Was recommended by Dr. Pierre to have 5 year follow-up. This  was ordered and Gastro decided due to her age she would be ok not to have the scope.   LABS:  Cbc and cmp today wnl    ASSESSMENT:  1. Bilateral breast cancer with metastatic disease to the lung. Status post bilateral mastectomies and lung resection. Status post chemotherapy and chest wall radiation and on long-term tamoxifen.  2. Sleep apnea. Using CPAP without difficulty  3. Health maintenance. Colonoscopy canceled by gastro - decided not needed  4. Hearing loss corrected with bilateral hearing aides  5. Thrombocytopenia intermittent and relatively stable-   6. CXR - wnl in Feb 2020  7.  Dizziness - could be inner ear related since positional- has chronic pnd    PLAN:  1.  RTC 6 mons for exam  2.  in August 2020 rtc for PA and lateral chest x-ray, CBC and chem 20- tsh and lipids- pt would like to have the covid antibody testing due to severe uri in Feb 2020  3. B12 sublinguinal daily.   4. Patient was asked to perform monthly chest wall examinations and was instructed on what to look for during the exam. Continue to take Tamoxifen as directed by Dr. Toney  5. Patient encouraged to get back into her walking and she agrees.  Wt loss?  6. Dizziness- intermittent nose bleed- dizziness is positional- could be related to inner ear vs side effect of the sudafed. Asked her to stop sudafed. Use saline nasal spray to moisten membranes and start zyrtec or claritin daily - we will send to ENT for evaluation of dizziness, nose bleed and chronic pnd. Pt agrees    Clarice Lombardi, RN, MSN, NP-C

## 2020-06-04 ENCOUNTER — LAB VISIT (OUTPATIENT)
Dept: LAB | Facility: HOSPITAL | Age: 85
End: 2020-06-04
Attending: INTERNAL MEDICINE
Payer: MEDICARE

## 2020-06-04 ENCOUNTER — OFFICE VISIT (OUTPATIENT)
Dept: HEMATOLOGY/ONCOLOGY | Facility: CLINIC | Age: 85
End: 2020-06-04
Payer: MEDICARE

## 2020-06-04 VITALS
SYSTOLIC BLOOD PRESSURE: 119 MMHG | TEMPERATURE: 98 F | HEART RATE: 85 BPM | DIASTOLIC BLOOD PRESSURE: 74 MMHG | WEIGHT: 132.94 LBS | BODY MASS INDEX: 27.91 KG/M2 | RESPIRATION RATE: 18 BRPM | OXYGEN SATURATION: 98 % | HEIGHT: 58 IN

## 2020-06-04 DIAGNOSIS — Z85.89 HISTORY OF SECONDARY LUNG CANCER: ICD-10-CM

## 2020-06-04 DIAGNOSIS — R42 DIZZINESSES: ICD-10-CM

## 2020-06-04 DIAGNOSIS — R79.9 ABNORMAL FINDING OF BLOOD CHEMISTRY, UNSPECIFIED: ICD-10-CM

## 2020-06-04 DIAGNOSIS — Z13.220 LIPID SCREENING: ICD-10-CM

## 2020-06-04 DIAGNOSIS — Z85.3 HISTORY OF BILATERAL BREAST CANCER: Primary | ICD-10-CM

## 2020-06-04 DIAGNOSIS — Z86.39 HISTORY OF HYPERLIPIDEMIA: ICD-10-CM

## 2020-06-04 DIAGNOSIS — Z87.09 HISTORY OF URI (UPPER RESPIRATORY INFECTION): ICD-10-CM

## 2020-06-04 DIAGNOSIS — D53.9 NUTRITIONAL ANEMIA, UNSPECIFIED: ICD-10-CM

## 2020-06-04 DIAGNOSIS — Z85.3 HISTORY OF BILATERAL BREAST CANCER: ICD-10-CM

## 2020-06-04 DIAGNOSIS — Z85.3 HISTORY OF MALIGNANT NEOPLASM OF BOTH BREASTS: ICD-10-CM

## 2020-06-04 LAB
ALBUMIN SERPL BCP-MCNC: 3.7 G/DL (ref 3.5–5.2)
ALP SERPL-CCNC: 48 U/L (ref 55–135)
ALT SERPL W/O P-5'-P-CCNC: 9 U/L (ref 10–44)
ANION GAP SERPL CALC-SCNC: 12 MMOL/L (ref 8–16)
AST SERPL-CCNC: 15 U/L (ref 10–40)
BASOPHILS # BLD AUTO: 0.03 K/UL (ref 0–0.2)
BASOPHILS NFR BLD: 0.5 % (ref 0–1.9)
BILIRUB SERPL-MCNC: 0.4 MG/DL (ref 0.1–1)
BUN SERPL-MCNC: 23 MG/DL (ref 10–30)
CALCIUM SERPL-MCNC: 8.9 MG/DL (ref 8.7–10.5)
CHLORIDE SERPL-SCNC: 106 MMOL/L (ref 95–110)
CO2 SERPL-SCNC: 24 MMOL/L (ref 23–29)
CREAT SERPL-MCNC: 0.7 MG/DL (ref 0.5–1.4)
DIFFERENTIAL METHOD: ABNORMAL
EOSINOPHIL # BLD AUTO: 0.1 K/UL (ref 0–0.5)
EOSINOPHIL NFR BLD: 1.6 % (ref 0–8)
ERYTHROCYTE [DISTWIDTH] IN BLOOD BY AUTOMATED COUNT: 14.7 % (ref 11.5–14.5)
EST. GFR  (AFRICAN AMERICAN): >60 ML/MIN/1.73 M^2
EST. GFR  (NON AFRICAN AMERICAN): >60 ML/MIN/1.73 M^2
GLUCOSE SERPL-MCNC: 99 MG/DL (ref 70–110)
HCT VFR BLD AUTO: 39.9 % (ref 37–48.5)
HGB BLD-MCNC: 12.3 G/DL (ref 12–16)
IMM GRANULOCYTES # BLD AUTO: 0.02 K/UL (ref 0–0.04)
IMM GRANULOCYTES NFR BLD AUTO: 0.4 % (ref 0–0.5)
LYMPHOCYTES # BLD AUTO: 1.8 K/UL (ref 1–4.8)
LYMPHOCYTES NFR BLD: 31.9 % (ref 18–48)
MCH RBC QN AUTO: 27.5 PG (ref 27–31)
MCHC RBC AUTO-ENTMCNC: 30.8 G/DL (ref 32–36)
MCV RBC AUTO: 89 FL (ref 82–98)
MONOCYTES # BLD AUTO: 0.7 K/UL (ref 0.3–1)
MONOCYTES NFR BLD: 12.6 % (ref 4–15)
NEUTROPHILS # BLD AUTO: 2.9 K/UL (ref 1.8–7.7)
NEUTROPHILS NFR BLD: 53 % (ref 38–73)
NRBC BLD-RTO: 0 /100 WBC
PLATELET # BLD AUTO: 170 K/UL (ref 150–350)
PMV BLD AUTO: 9.7 FL (ref 9.2–12.9)
POTASSIUM SERPL-SCNC: 3.9 MMOL/L (ref 3.5–5.1)
PROT SERPL-MCNC: 7.4 G/DL (ref 6–8.4)
RBC # BLD AUTO: 4.48 M/UL (ref 4–5.4)
SODIUM SERPL-SCNC: 142 MMOL/L (ref 136–145)
WBC # BLD AUTO: 5.49 K/UL (ref 3.9–12.7)

## 2020-06-04 PROCEDURE — 99214 OFFICE O/P EST MOD 30 MIN: CPT | Mod: HCNC,S$GLB,, | Performed by: NURSE PRACTITIONER

## 2020-06-04 PROCEDURE — 99999 PR PBB SHADOW E&M-EST. PATIENT-LVL V: ICD-10-PCS | Mod: PBBFAC,HCNC,, | Performed by: NURSE PRACTITIONER

## 2020-06-04 PROCEDURE — 1126F PR PAIN SEVERITY QUANTIFIED, NO PAIN PRESENT: ICD-10-PCS | Mod: HCNC,S$GLB,, | Performed by: NURSE PRACTITIONER

## 2020-06-04 PROCEDURE — 99499 RISK ADDL DX/OHS AUDIT: ICD-10-PCS | Mod: HCNC,S$GLB,, | Performed by: NURSE PRACTITIONER

## 2020-06-04 PROCEDURE — 85025 COMPLETE CBC W/AUTO DIFF WBC: CPT | Mod: HCNC

## 2020-06-04 PROCEDURE — 1159F PR MEDICATION LIST DOCUMENTED IN MEDICAL RECORD: ICD-10-PCS | Mod: HCNC,S$GLB,, | Performed by: NURSE PRACTITIONER

## 2020-06-04 PROCEDURE — 1126F AMNT PAIN NOTED NONE PRSNT: CPT | Mod: HCNC,S$GLB,, | Performed by: NURSE PRACTITIONER

## 2020-06-04 PROCEDURE — 1101F PR PT FALLS ASSESS DOC 0-1 FALLS W/OUT INJ PAST YR: ICD-10-PCS | Mod: HCNC,CPTII,S$GLB, | Performed by: NURSE PRACTITIONER

## 2020-06-04 PROCEDURE — 99214 PR OFFICE/OUTPT VISIT, EST, LEVL IV, 30-39 MIN: ICD-10-PCS | Mod: HCNC,S$GLB,, | Performed by: NURSE PRACTITIONER

## 2020-06-04 PROCEDURE — 36415 COLL VENOUS BLD VENIPUNCTURE: CPT | Mod: HCNC

## 2020-06-04 PROCEDURE — 1101F PT FALLS ASSESS-DOCD LE1/YR: CPT | Mod: HCNC,CPTII,S$GLB, | Performed by: NURSE PRACTITIONER

## 2020-06-04 PROCEDURE — 1159F MED LIST DOCD IN RCRD: CPT | Mod: HCNC,S$GLB,, | Performed by: NURSE PRACTITIONER

## 2020-06-04 PROCEDURE — 99999 PR PBB SHADOW E&M-EST. PATIENT-LVL V: CPT | Mod: PBBFAC,HCNC,, | Performed by: NURSE PRACTITIONER

## 2020-06-04 PROCEDURE — 80053 COMPREHEN METABOLIC PANEL: CPT | Mod: HCNC

## 2020-06-04 PROCEDURE — 99499 UNLISTED E&M SERVICE: CPT | Mod: HCNC,S$GLB,, | Performed by: NURSE PRACTITIONER

## 2020-06-07 ENCOUNTER — HOSPITAL ENCOUNTER (EMERGENCY)
Facility: HOSPITAL | Age: 85
Discharge: HOME | End: 2020-06-07
Attending: EMERGENCY MEDICINE
Payer: MEDICARE

## 2020-06-07 VITALS
HEART RATE: 82 BPM | HEIGHT: 58 IN | SYSTOLIC BLOOD PRESSURE: 168 MMHG | TEMPERATURE: 99 F | BODY MASS INDEX: 27.71 KG/M2 | WEIGHT: 132 LBS | DIASTOLIC BLOOD PRESSURE: 76 MMHG | OXYGEN SATURATION: 96 % | RESPIRATION RATE: 19 BRPM

## 2020-06-07 DIAGNOSIS — R53.83 FATIGUE: ICD-10-CM

## 2020-06-07 LAB
ALBUMIN SERPL BCP-MCNC: 4 G/DL (ref 3.5–5.2)
ALP SERPL-CCNC: 51 U/L (ref 55–135)
ALT SERPL W/O P-5'-P-CCNC: 10 U/L (ref 10–44)
ANION GAP SERPL CALC-SCNC: 14 MMOL/L (ref 8–16)
AST SERPL-CCNC: 18 U/L (ref 10–40)
BASOPHILS # BLD AUTO: 0.05 K/UL (ref 0–0.2)
BASOPHILS NFR BLD: 1 % (ref 0–1.9)
BILIRUB SERPL-MCNC: 0.4 MG/DL (ref 0.1–1)
BILIRUB UR QL STRIP: NEGATIVE
BUN SERPL-MCNC: 16 MG/DL (ref 10–30)
CALCIUM SERPL-MCNC: 9 MG/DL (ref 8.7–10.5)
CHLORIDE SERPL-SCNC: 110 MMOL/L (ref 95–110)
CLARITY UR REFRACT.AUTO: CLEAR
CO2 SERPL-SCNC: 20 MMOL/L (ref 23–29)
COLOR UR AUTO: COLORLESS
CREAT SERPL-MCNC: 0.7 MG/DL (ref 0.5–1.4)
DIFFERENTIAL METHOD: ABNORMAL
EOSINOPHIL # BLD AUTO: 0.1 K/UL (ref 0–0.5)
EOSINOPHIL NFR BLD: 1.2 % (ref 0–8)
ERYTHROCYTE [DISTWIDTH] IN BLOOD BY AUTOMATED COUNT: 14.7 % (ref 11.5–14.5)
EST. GFR  (AFRICAN AMERICAN): >60 ML/MIN/1.73 M^2
EST. GFR  (NON AFRICAN AMERICAN): >60 ML/MIN/1.73 M^2
ETHANOL SERPL-MCNC: 88 MG/DL
GLUCOSE SERPL-MCNC: 95 MG/DL (ref 70–110)
GLUCOSE UR QL STRIP: NEGATIVE
HCT VFR BLD AUTO: 43.7 % (ref 37–48.5)
HGB BLD-MCNC: 13.7 G/DL (ref 12–16)
HGB UR QL STRIP: ABNORMAL
IMM GRANULOCYTES # BLD AUTO: 0.02 K/UL (ref 0–0.04)
IMM GRANULOCYTES NFR BLD AUTO: 0.4 % (ref 0–0.5)
KETONES UR QL STRIP: NEGATIVE
LEUKOCYTE ESTERASE UR QL STRIP: NEGATIVE
LYMPHOCYTES # BLD AUTO: 1.3 K/UL (ref 1–4.8)
LYMPHOCYTES NFR BLD: 24.9 % (ref 18–48)
MCH RBC QN AUTO: 28.1 PG (ref 27–31)
MCHC RBC AUTO-ENTMCNC: 31.4 G/DL (ref 32–36)
MCV RBC AUTO: 90 FL (ref 82–98)
MICROSCOPIC COMMENT: NORMAL
MONOCYTES # BLD AUTO: 0.6 K/UL (ref 0.3–1)
MONOCYTES NFR BLD: 11.3 % (ref 4–15)
NEUTROPHILS # BLD AUTO: 3.2 K/UL (ref 1.8–7.7)
NEUTROPHILS NFR BLD: 61.2 % (ref 38–73)
NITRITE UR QL STRIP: NEGATIVE
NRBC BLD-RTO: 0 /100 WBC
PH UR STRIP: 6 [PH] (ref 5–8)
PLATELET # BLD AUTO: 167 K/UL (ref 150–350)
PMV BLD AUTO: 9.3 FL (ref 9.2–12.9)
POCT GLUCOSE: 81 MG/DL (ref 70–110)
POTASSIUM SERPL-SCNC: 3.4 MMOL/L (ref 3.5–5.1)
PROT SERPL-MCNC: 8.1 G/DL (ref 6–8.4)
PROT UR QL STRIP: NEGATIVE
RBC # BLD AUTO: 4.88 M/UL (ref 4–5.4)
RBC #/AREA URNS AUTO: 1 /HPF (ref 0–4)
SODIUM SERPL-SCNC: 144 MMOL/L (ref 136–145)
SP GR UR STRIP: 1 (ref 1–1.03)
TSH SERPL DL<=0.005 MIU/L-ACNC: 2.01 UIU/ML (ref 0.4–4)
URN SPEC COLLECT METH UR: ABNORMAL
WBC # BLD AUTO: 5.14 K/UL (ref 3.9–12.7)
WBC #/AREA URNS AUTO: 1 /HPF (ref 0–5)

## 2020-06-07 PROCEDURE — 99284 PR EMERGENCY DEPT VISIT,LEVEL IV: ICD-10-PCS | Mod: HCNC,,, | Performed by: EMERGENCY MEDICINE

## 2020-06-07 PROCEDURE — 93010 EKG 12-LEAD: ICD-10-PCS | Mod: HCNC,,, | Performed by: INTERNAL MEDICINE

## 2020-06-07 PROCEDURE — 82962 GLUCOSE BLOOD TEST: CPT | Mod: HCNC

## 2020-06-07 PROCEDURE — 99284 EMERGENCY DEPT VISIT MOD MDM: CPT | Mod: HCNC,,, | Performed by: EMERGENCY MEDICINE

## 2020-06-07 PROCEDURE — 85025 COMPLETE CBC W/AUTO DIFF WBC: CPT | Mod: HCNC

## 2020-06-07 PROCEDURE — 93005 ELECTROCARDIOGRAM TRACING: CPT | Mod: HCNC

## 2020-06-07 PROCEDURE — 80053 COMPREHEN METABOLIC PANEL: CPT | Mod: HCNC

## 2020-06-07 PROCEDURE — 81001 URINALYSIS AUTO W/SCOPE: CPT | Mod: 59,HCNC

## 2020-06-07 PROCEDURE — 80320 DRUG SCREEN QUANTALCOHOLS: CPT | Mod: HCNC

## 2020-06-07 PROCEDURE — 99285 EMERGENCY DEPT VISIT HI MDM: CPT | Mod: 25,HCNC

## 2020-06-07 PROCEDURE — 93010 ELECTROCARDIOGRAM REPORT: CPT | Mod: HCNC,,, | Performed by: INTERNAL MEDICINE

## 2020-06-07 PROCEDURE — 84443 ASSAY THYROID STIM HORMONE: CPT | Mod: HCNC

## 2020-06-07 NOTE — ED PROVIDER NOTES
"Encounter Date: 6/7/2020       History     Chief Complaint   Patient presents with    Fatigue     arrived via [facility] with c/o "not feeling well" - family reports patient more forgetful for approx 3 months     HPI     89 yo female with PMH breast cancer 30+ years ago, sent to ED by family for altered mental status. The patient states she does not know why she is here and can't remember how she got here. States she was feeling fine, was at home with her sister making gin and tonics tonight.    She denies any pain or discomfort. The family is not at bedside. Via telephone, the patients niece explains the family is worried about the patients memory problems. Also states that when the patient was at home earlier she suddenly complained of "feeling bad" and "needing some fresh air".  Patient denies these symptoms now.     Review of patient's allergies indicates:  No Known Allergies  Past Medical History:   Diagnosis Date    Allergy     Basal cell carcinoma 2003    Dr. 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 SAQIB Nielsen/ right side of nose and forhead also.     Past Surgical History:   Procedure Laterality Date    BREAST SURGERY Right 1990    x2- Mastectomy    BREAST SURGERY Left     Mastectomy    CATARACT EXTRACTION Bilateral 2012    Dr. Crocker    EYE SURGERY      MASTECTOMY, RADICAL Left 1988     Family History   Problem Relation Age of Onset    Heart failure Mother     Hypertension Mother     Heart disease Mother     Heart failure Father     Heart disease Father     Heart failure Brother     Hypertension Brother     Heart disease Brother     Hypertension Sister     Hypertension Sister     Heart disease Sister     Cancer Maternal Aunt     Hypertension Maternal Uncle     Hypertension Paternal Aunt     Hypertension Paternal Uncle     Asthma Cousin     Cancer " Cousin     Amblyopia Neg Hx     Blindness Neg Hx     Cataracts Neg Hx     Diabetes Neg Hx     Glaucoma Neg Hx     Macular degeneration Neg Hx     Retinal detachment Neg Hx     Strabismus Neg Hx     Stroke Neg Hx     Thyroid disease Neg Hx     Melanoma Neg Hx     Psoriasis Neg Hx     Lupus Neg Hx      Social History     Tobacco Use    Smoking status: Never Smoker    Smokeless tobacco: Never Used   Substance Use Topics    Alcohol use: Yes     Alcohol/week: 0.0 standard drinks     Binge frequency: Never     Comment: social    Drug use: No     Review of Systems   Unable to perform ROS: Mental status change       Physical Exam     Initial Vitals [06/07/20 1609]   BP Pulse Resp Temp SpO2   123/71 96 16 -- 98 %      MAP       --         Physical Exam    Nursing note and vitals reviewed.  Constitutional: She appears well-developed and well-nourished.   HENT:   Head: Normocephalic and atraumatic.   Mouth/Throat: Oropharynx is clear and moist.   Eyes: EOM are normal. Pupils are equal, round, and reactive to light. No scleral icterus.   Neck: Normal range of motion. Neck supple. No JVD present.   Cardiovascular: Normal rate, regular rhythm and intact distal pulses.   Pulmonary/Chest: No respiratory distress. She has no wheezes.   Abdominal: Soft. She exhibits no distension.   Musculoskeletal: Normal range of motion. She exhibits no edema.   Neurological: She is alert and oriented to person, place, and time.   Pt is alert, answers questions appropriately. Knows her name, the year and the location but cannot explain why she is here. Ambulatory with steady gait, no focal motor deficits. No pronator drift or facial droop.    Skin: Skin is warm and dry.   Psychiatric: She has a normal mood and affect.         ED Course   Procedures  Labs Reviewed - No data to display       Imaging Results    None          Medical Decision Making:   Initial Assessment:   In brief, 89 y.o. presents with concern from family for memory  problems. Pt has no complaints today   Vitals are significant for hypertension 172/77. Pt is well appearing, ambulatory.      Plan to  Obtain basic labs and head CT to rule out metabolic, structural causes for altered mental status.   Dispo pending workup.    Sravani Partida MD  [facility] Emergency Medicine Residency PGY-2  06/07/2020 6:52 PM       Differential Diagnosis:   Dementia, delirium, uti, etoh, polypharmacy, withdrawl  ED Management:  UPDATE:   Workup significant for: ETOH 88. NO UTI on UA. Head CT shows no head bleed or mass effect.   Mental status has improved, the patient feels well and is less confused.   Vital signs are stable. Suspect etoh intoxication, now clearing.     Dispo: to home with family. Called sister to update her re: normal lab work, pt stable for home. Sister will come pick her up.     Sravani Partida MD  [facility] Emergency Medicine Residency PGY-2  06/07/2020 7:47 PM                                      Clinical Impression:       ICD-10-CM ICD-9-CM   1. Fatigue R53.83 780.79                                Sravani Partida MD  Resident  06/07/20 1955

## 2020-06-07 NOTE — ED NOTES
Spoke with pt's niece who stated pt has not been getting things straight lately, was complaining of a headache today and also said she needed fresh air because she could not breathe.

## 2020-06-07 NOTE — ED NOTES
Pt identifiers Samantha Balderrama were checked and are correct  LOC: The patient is awake, alert, aware of environment with an appropriate affect. Oriented x4, speaking appropriately  APPEARANCE: Pt resting comfortably, in no acute distress, pt is clean and well groomed, clothing properly fastened  SKIN: Skin warm, dry and intact, normal skin turgor, moist mucus membranes  RESPIRATORY: Airway is open and patent, respirations are spontaneous, even and unlabored, normal effort and rate Breath sounds clear to upper lung fields , crackles auscultated to lower lung fields   CARDIAC: Radial pulses strong and regular  no peripheral edema noted, capillary refill < 3 seconds, bilateral radial pulses 2+  ABDOMEN: Soft, nontender, nondistended. Bowel sounds present to all four quad of abd on auscultation  NEUROLOGIC: PERRL, facial expression is symmetrical, patient moving all extremities spontaneously, normal sensation in all extremities when touched with a finger.  Follows all commands appropriately  MUSCULOSKELETAL: No obvious deformities.

## 2020-06-08 NOTE — ED NOTES
Pt care assumed. Report received by TAVARES Viveros. Pt lying in stretcher in low and locked position and side rails raised x2. Call light, pt's belongings, and bedside table within pt's reach. Pt on continuous cardiac monitoring, pulse oximetry, and BP cycling every 30 minutes. Pt in NAD and verbalized no needs at this time.

## 2020-06-11 ENCOUNTER — OFFICE VISIT (OUTPATIENT)
Dept: OTOLARYNGOLOGY | Facility: CLINIC | Age: 85
End: 2020-06-11
Payer: MEDICARE

## 2020-06-11 VITALS
SYSTOLIC BLOOD PRESSURE: 178 MMHG | TEMPERATURE: 98 F | HEART RATE: 93 BPM | WEIGHT: 131.19 LBS | DIASTOLIC BLOOD PRESSURE: 80 MMHG | BODY MASS INDEX: 27.42 KG/M2

## 2020-06-11 DIAGNOSIS — R04.0 EPISTAXIS: ICD-10-CM

## 2020-06-11 PROCEDURE — 99203 PR OFFICE/OUTPT VISIT, NEW, LEVL III, 30-44 MIN: ICD-10-PCS | Mod: 25,HCNC,S$GLB, | Performed by: OTOLARYNGOLOGY

## 2020-06-11 PROCEDURE — 30901 PR CTRL 2SEBLEED,ANTER,SIMPLE: ICD-10-PCS | Mod: HCNC,RT,S$GLB, | Performed by: OTOLARYNGOLOGY

## 2020-06-11 PROCEDURE — 1159F PR MEDICATION LIST DOCUMENTED IN MEDICAL RECORD: ICD-10-PCS | Mod: HCNC,S$GLB,, | Performed by: OTOLARYNGOLOGY

## 2020-06-11 PROCEDURE — 1101F PR PT FALLS ASSESS DOC 0-1 FALLS W/OUT INJ PAST YR: ICD-10-PCS | Mod: HCNC,CPTII,S$GLB, | Performed by: OTOLARYNGOLOGY

## 2020-06-11 PROCEDURE — 1126F AMNT PAIN NOTED NONE PRSNT: CPT | Mod: HCNC,S$GLB,, | Performed by: OTOLARYNGOLOGY

## 2020-06-11 PROCEDURE — 99203 OFFICE O/P NEW LOW 30 MIN: CPT | Mod: 25,HCNC,S$GLB, | Performed by: OTOLARYNGOLOGY

## 2020-06-11 PROCEDURE — 99999 PR PBB SHADOW E&M-EST. PATIENT-LVL III: CPT | Mod: PBBFAC,HCNC,, | Performed by: OTOLARYNGOLOGY

## 2020-06-11 PROCEDURE — 1159F MED LIST DOCD IN RCRD: CPT | Mod: HCNC,S$GLB,, | Performed by: OTOLARYNGOLOGY

## 2020-06-11 PROCEDURE — 1101F PT FALLS ASSESS-DOCD LE1/YR: CPT | Mod: HCNC,CPTII,S$GLB, | Performed by: OTOLARYNGOLOGY

## 2020-06-11 PROCEDURE — 99999 PR PBB SHADOW E&M-EST. PATIENT-LVL III: ICD-10-PCS | Mod: PBBFAC,HCNC,, | Performed by: OTOLARYNGOLOGY

## 2020-06-11 PROCEDURE — 1126F PR PAIN SEVERITY QUANTIFIED, NO PAIN PRESENT: ICD-10-PCS | Mod: HCNC,S$GLB,, | Performed by: OTOLARYNGOLOGY

## 2020-06-11 PROCEDURE — 30901 CONTROL OF NOSEBLEED: CPT | Mod: HCNC,RT,S$GLB, | Performed by: OTOLARYNGOLOGY

## 2020-06-11 NOTE — LETTER
June 11, 2020      [place]  [address] [address] [facility]  [place] [address]           [facility]  [address] [facility] [address]  Phone: [phone]  Fax: [phone]          Patient: Samantha Balderrama   MR Number: [id number]   YOB: 1927   Date of Visit: 6/11/2020       Dear Clarice Lombardi:    Thank you for referring Samantha Balderrama to me for evaluation. Attached you will find relevant portions of my assessment and plan of care.    If you have questions, please do not hesitate to call me. I look forward to following Samantha Balderrama along with you.    Sincerely,    Ronal Moore MD    Enclosure  CC:  No Recipients    If you would like to receive this communication electronically, please contact [email or web address] or [phone] to request more information on [employer] access.    For providers and/or their staff who would like to refer a patient to [facility], please contact us through our one-stop-shop provider referral line, Galo Goldstein, at [phone].    If you feel you have received this communication in error or would no longer like to receive these types of communications, please e-mail [email or web address]

## 2020-06-11 NOTE — PROGRESS NOTES
Referring Provider:    [place]  [address] [facility] [address] [facility]  [place] [address]  Subjective:   Patient: Samantha Badlerrama [id number], :10/28/1927   Visit date:2020 2:49 PM    Chief Complaint:  Cough (c/o sinus drip referred by hayley amaya)    HPI:  Samantha is a 89 y.o. female who I was asked to see in consultation for evaluation of the following issue(s):     This is the 1st time I am seeing the patient.  She is here to discuss 3 issues.  For the 1st thing is that she has dry coughing in the morning.  This is mildly bothersome to her.  It is nonproductive.  She denies any shortness of breath.  She uses CPAP machine at night.  She denies any shortness of breath.  The 2nd issue is that she has right-sided nose bleeds.  These can be fairly rapid times.  She denies any specific exacerbating or relieving factors.  The 30 she was mild congestion the ears.  She feels like her left ear is more stopped up than the right.  She wears hearing aids bilaterally.    Review of Systems:  -     Allergic/Immunologic: has No Known Allergies..  -     Constitutional: Current temp: 97.7 °F (36.5 °C) (Tympanic)      Her meds, allergies, medical, surgical, social & family histories were reviewed & updated:  -     She has a current medication list which includes the following prescription(s): atorvastatin, benzonatate, ergocalciferol, fluticasone propionate, fluzone high-dose 2018-19 (pf), montelukast, montelukast, polytussin dm, prednisone, salt moisturizing solution no1, sodium chloride, tamoxifen, tamoxifen, and vit a/vit c/vit e/zinc/copper.  -     She  has a past medical history of Allergy, Basal cell carcinoma (), Drusen (degenerative) of retina (2013), Fatty liver (), Lung cancer (), and Squamous cell carcinoma ().   -     She does not have any pertinent problems on file.   -     She  has a past surgical history that includes Eye surgery; Cataract extraction (Bilateral, ); Mastectomy,  radical (Left, 1988); Breast surgery (Right, 1990); and Breast surgery (Left).  -     She  reports that she has never smoked. She has never used smokeless tobacco. She reports that she drinks alcohol. She reports that she does not use drugs.  -     Her family history includes Asthma in her cousin; Cancer in her cousin and maternal aunt; Heart disease in her brother, father, mother, and sister; Heart failure in her brother, father, and mother; Hypertension in her brother, maternal uncle, mother, paternal aunt, paternal uncle, sister, and sister.  -     She has No Known Allergies.    Objective:     Physical Exam:  Vitals:  BP (!) 178/80   Pulse 93   Temp 97.7 °F (36.5 °C) (Tympanic)   Wt 59.5 kg (131 lb 2.8 oz)   BMI 27.42 kg/m²   General appearance:  Well developed, well nourished    Eyes:  Extraocular motions intact, PERRL    Communication:  no hoarseness, no dysphonia    Ears:  Otoscopy of external auditory canals and tympanic membranes was normal, clinical speech reception thresholds grossly intact, no mass/lesion of auricle.  Nose:  No masses/lesions of external nose, nasal mucosa, septum, and turbinates were within normal limits.  Mildly dilated vessels on the right caudal septum.   Mouth:  No mass/lesion of lips, teeth, gums, hard/soft palate, tongue, tonsils, or oropharynx.    Cardiovascular:  No pedal edema; Radial Pulses +2     Neck & Lymphatics:  No cervical lymphadenopathy, no neck mass/crepitus/ asymmetry, trachea is midline, no thyroid enlargement/tenderness/mass.    Psych: Oriented x3,  Alert with normal mood and affect.     Respiration/Chest:  Symmetric expansion during respiration, normal respiratory effort.    Skin:  Warm and intact. No ulcerations of face, scalp, neck.      Assessment & Plan:   Epistaxis  -     Ambulatory referral/consult to ENT      I did not see any significant evidence of a sinonasal cause of her cough for laryngeal cause of her cough.  Anatomic changes in the larynx would  typically not occur only in the morning and would be accompanied by voice changes.  Cauterization was performed of the right nasal cavity.  There was a small amount of wax in both ears and this was removed.    We discussed her medical conditions, treatments and plan.  Samantha should return to clinic if any issues arise (symptoms worsen or persist), otherwise we will see her back in the clinic only as needed.      Thank you for allowing me to participate in the care of Samantha.       Ronal Moore MD, FACS  [facility]   [facility]  [address]  [place] [address]  P: [phone]  F: [phone]      Patient: Samantha Balderrama [id number], :10/28/1927  Procedure date:2020  Patient's medications, allergies, past medical, surgical, social and family histories were reviewed and updated as appropriate.  Chief Complaint:  Cough (c/o sinus drip referred by hayley amaya)    HPI:  Samantha is a 89 y.o. female with the history of present illness as discussed in the clinic note from today.    Procedure: Risks, benefits, and alternatives of the procedure were discussed with the patient, and the patient consented to the control of epistaxis.  The nasal cavity was sprayed with a topical decongestant and topical anesthetic. After adequate anesthesia was obtained, control of epistaxis was performed for the right side(s).  The concerning area(s) for bleeding were addressed with silver nitrate applied topically to the area(s) and at the end of the procedure there was no further bleeding from the nose and sinuses.  The patient tolerated the procedure well with no complications.    Nasal Findings  -     The area(s) causing the epistaxis (and cauterized today) were found to be arising from prominent blood vessels located at the caudal septum in Kiesselbach's plexus (Little's area) on the right side.    Assessment & Plan:  - see today's clinic note

## 2020-08-26 ENCOUNTER — TELEPHONE (OUTPATIENT)
Dept: HEMATOLOGY/ONCOLOGY | Facility: CLINIC | Age: 85
End: 2020-08-26

## 2020-08-27 ENCOUNTER — TELEPHONE (OUTPATIENT)
Dept: HEMATOLOGY/ONCOLOGY | Facility: CLINIC | Age: 85
End: 2020-08-27

## 2020-10-05 ENCOUNTER — PES CALL (OUTPATIENT)
Dept: ADMINISTRATIVE | Facility: CLINIC | Age: 85
End: 2020-10-05

## 2020-10-19 ENCOUNTER — TELEPHONE (OUTPATIENT)
Dept: SURGERY | Facility: CLINIC | Age: 85
End: 2020-10-19

## 2020-10-19 ENCOUNTER — DOCUMENTATION ONLY (OUTPATIENT)
Dept: SURGERY | Facility: CLINIC | Age: 85
End: 2020-10-19

## 2020-10-19 DIAGNOSIS — Z85.3 HISTORY OF BILATERAL BREAST CANCER: Chronic | ICD-10-CM

## 2020-10-19 DIAGNOSIS — R93.89 ABNORMAL CHEST X-RAY: Primary | ICD-10-CM

## 2020-10-19 DIAGNOSIS — Z85.89 HISTORY OF SECONDARY LUNG CANCER: Chronic | ICD-10-CM

## 2020-10-19 NOTE — PROGRESS NOTES
Pt called to report that she went to the ED and was diagnosed with pneumonia. Tried to call pt back to see if we could get information about the date of her visit and location so we could obtain records- left message on her voice mail.    Clarice

## 2020-10-19 NOTE — TELEPHONE ENCOUNTER
----- Message from Court Nielsen sent at 10/19/2020  3:51 PM CDT -----  Contact: [phone]  Would like to consult with the nurse, patient is returning the nurse call, please call back thanks sj   .Type:  Patient Returning Call    Who Called ms Palomino  Who Left Message for Patient:  Does the patient know what this is regarding?:  Would the patient rather a call back or a response via [facility]? callback  Best Call Back Number:[phone]  Additional Information:

## 2020-10-19 NOTE — TELEPHONE ENCOUNTER
----- Message from Felipa Bo sent at 10/19/2020 12:32 PM CDT -----  Contact: Samantha Singh would like a call back at [phone], Regards to a recent ER visit and she was diagnose with pneumonia.    Thanks  Td

## 2020-10-20 ENCOUNTER — OFFICE VISIT (OUTPATIENT)
Dept: URGENT CARE | Facility: CLINIC | Age: 85
End: 2020-10-20
Payer: MEDICARE

## 2020-10-20 VITALS
WEIGHT: 131 LBS | SYSTOLIC BLOOD PRESSURE: 148 MMHG | DIASTOLIC BLOOD PRESSURE: 68 MMHG | HEART RATE: 94 BPM | BODY MASS INDEX: 27.5 KG/M2 | OXYGEN SATURATION: 96 % | TEMPERATURE: 99 F | HEIGHT: 58 IN

## 2020-10-20 DIAGNOSIS — R09.82 POSTNASAL DRIP: Primary | ICD-10-CM

## 2020-10-20 DIAGNOSIS — Z03.818 ENCOUNTER FOR OBSERVATION FOR SUSPECTED EXPOSURE TO OTHER BIOLOGICAL AGENTS RULED OUT: ICD-10-CM

## 2020-10-20 DIAGNOSIS — R01.1 SYSTOLIC MURMUR: ICD-10-CM

## 2020-10-20 LAB
CTP QC/QA: YES
SARS-COV-2 RDRP RESP QL NAA+PROBE: NEGATIVE

## 2020-10-20 PROCEDURE — U0002 COVID-19 LAB TEST NON-CDC: HCPCS | Mod: QW,S$GLB,, | Performed by: STUDENT IN AN ORGANIZED HEALTH CARE EDUCATION/TRAINING PROGRAM

## 2020-10-20 PROCEDURE — 99214 OFFICE O/P EST MOD 30 MIN: CPT | Mod: S$GLB,CS,, | Performed by: STUDENT IN AN ORGANIZED HEALTH CARE EDUCATION/TRAINING PROGRAM

## 2020-10-20 PROCEDURE — U0002: ICD-10-PCS | Mod: QW,S$GLB,, | Performed by: STUDENT IN AN ORGANIZED HEALTH CARE EDUCATION/TRAINING PROGRAM

## 2020-10-20 PROCEDURE — 99214 PR OFFICE/OUTPT VISIT, EST, LEVL IV, 30-39 MIN: ICD-10-PCS | Mod: S$GLB,CS,, | Performed by: STUDENT IN AN ORGANIZED HEALTH CARE EDUCATION/TRAINING PROGRAM

## 2020-10-20 RX ORDER — INFLUENZA A VIRUS A/MICHIGAN/45/2015 X-275 (H1N1) ANTIGEN (FORMALDEHYDE INACTIVATED), INFLUENZA A VIRUS A/SINGAPORE/INFIMH-16-0019/2016 IVR-186 (H3N2) ANTIGEN (FORMALDEHYDE INACTIVATED), INFLUENZA B VIRUS B/PHUKET/3073/2013 ANTIGEN (FORMALDEHYDE INACTIVATED), AND INFLUENZA B VIRUS B/MARYLAND/15/2016 BX-69A ANTIGEN (FORMALDEHYDE INACTIVATED) 60; 60; 60; 60 UG/.7ML; UG/.7ML; UG/.7ML; UG/.7ML
INJECTION, SUSPENSION INTRAMUSCULAR
Status: ON HOLD | COMMUNITY
Start: 2020-09-26 | End: 2021-02-03 | Stop reason: HOSPADM

## 2020-10-20 NOTE — PROGRESS NOTES
"Subjective:       Patient ID: Samantha Balderrama is a 89 y.o. female.    Vitals:  height is 4' 10" (1.473 m) and weight is 59.4 kg (131 lb). Her temporal temperature is 98.9 °F (37.2 °C). Her blood pressure is 85/53 (abnormal) and her pulse is 100. Her oxygen saturation is 96%.     Chief Complaint: No chief complaint on file.    Pt presents for COVID testing. States she went to outside  yesterday because she spit up blood and was diagnosed with pneumonia by CXR and started on doxycycline. PCP instructed her to f/u today for COVID testing. Pt reports hx of epistaxis/bloody mucous from sinuses in past and denied cough or hemoptysis. No known sick contacts. Endorses mild intermittent dizziness but states this started before current episode. Denied f/c, GI sx's, CP, SoB, Lin, peripheral edema.     Sinus Problem  This is a new problem. The current episode started in the past 7 days. The problem is unchanged. There has been no fever. She is experiencing no pain. Associated symptoms include congestion and sinus pressure. Pertinent negatives include no chills, coughing, diaphoresis, ear pain, headaches, hoarse voice, neck pain, shortness of breath or sore throat. Past treatments include antibiotics. The treatment provided no relief.       Constitution: Negative for chills, sweating, fatigue and fever.   HENT: Positive for congestion, postnasal drip and sinus pressure. Negative for ear pain, nosebleeds, sinus pain, sore throat, trouble swallowing and voice change.    Neck: Negative for neck pain and painful lymph nodes.   Cardiovascular: Negative for chest pain, leg swelling, palpitations, sob on exertion and passing out.   Eyes: Negative for eye discharge, eye itching and eye redness.   Respiratory: Negative for chest tightness, cough, sputum production, bloody sputum, COPD, shortness of breath, stridor, wheezing and asthma.    Gastrointestinal: Negative for abdominal pain, nausea, vomiting and diarrhea.   Genitourinary: " "Negative for urine decreased.   Musculoskeletal: Negative for joint pain, joint swelling and muscle ache.   Skin: Negative for color change, rash and lesion.   Allergic/Immunologic: Negative for seasonal allergies and asthma.   Neurological: Positive for dizziness. Negative for passing out, speech difficulty, headaches and numbness.   Hematologic/Lymphatic: Negative for swollen lymph nodes.   Psychiatric/Behavioral: Negative for confusion, agitation and sleep disturbance.       Objective:       Vitals:    10/20/20 1332 10/20/20 1353   BP: (!) 85/53 (!) 148/68   BP Location:  Left arm   Patient Position: Sitting Sitting   BP Method: Large (Automatic) Medium (Manual)   Pulse: 100 94   Temp: 98.9 °F (37.2 °C)    TempSrc: Temporal    SpO2: 96%    Weight: 59.4 kg (131 lb)    Height: 4' 10" (1.473 m)      Physical Exam   Constitutional: She is oriented to person, place, and time. She appears well-developed. She does not appear ill. No distress.   HENT:   Head: Normocephalic and atraumatic.   Ears:   Right Ear: External ear normal.   Left Ear: External ear normal.   Nose: Mucosal edema and rhinorrhea present. No purulent discharge or nose lacerations. No epistaxis.   Mouth/Throat: Uvula is midline, oropharynx is clear and moist and mucous membranes are normal. No uvula swelling. No oropharyngeal exudate, posterior oropharyngeal edema, posterior oropharyngeal erythema or tonsillar abscesses.   No active bleeding in nose but dried blood seen at L distal nare      Comments: No active bleeding in nose but dried blood seen at L distal nare  Eyes: Conjunctivae and EOM are normal. Right eye exhibits no discharge. Left eye exhibits no discharge. No scleral icterus.   Neck: Normal range of motion. Neck supple.   Cardiovascular: Normal rate and regular rhythm. Exam reveals no friction rub.   Murmur (systolic) heard.  Pulmonary/Chest: Effort normal and breath sounds normal. No respiratory distress. She has no wheezes. She has no " "rhonchi. She has no rales.   Musculoskeletal: Normal range of motion.   Neurological: She is alert and oriented to person, place, and time. No cranial nerve deficit (CN II-XII grossly intact).   Skin: Skin is warm, dry and no rash. Psychiatric: Her behavior is normal. Judgment and thought content normal.   Nursing note and vitals reviewed.    Recent Lab Results       10/20/20  1409         Acceptable Yes     SARS-CoV-2 RNA, Amplification, Qual Negative             Assessment:       1. Postnasal drip    2. Encounter for observation for suspected exposure to other biological agents ruled out    3. Systolic murmur        Plan:         Postnasal drip  -     POCT COVID-19 Rapid Screening  - initial concern for sepsis or shock with initial BP, re-evaluation with manual cuff without hypotension and pt without tachycardia, fever, or hypoxia  - continue doxycycline as prescribed and avoid nasal sprays    Encounter for observation for suspected exposure to other biological agents ruled out  - discussed negative result with patient. Counseled Symptomatic patient without known exposure to continue home quarantine/isolation per CDC guidelines in event of false negative rapid COVID test    Systolic murmur  - pt denies prior hx of murmur but no complaints of SoB, CP, Lin, n/v, vision changes, etc; does report some mild intermittent dizziness that is new but has been occurring for "a while" due to stress. Counseled to f/u with PCP for further evaluation.    Results, medications and diagnosis reviewed with patient, questions answered, and return precautions given    Follow up in about 2 weeks (around 11/3/2020) for re-evaluation with your PCP, or sooner with ER if worsening symptoms.    Varun Echols MD/MPH  [facility]  [facility]         "

## 2020-10-20 NOTE — PATIENT INSTRUCTIONS
What is Pneumonia?    Pneumonia is a serious lung infection. Many cases of pneumonia are caused by bacteria or viruses. Fungi may also cause pneumonia, but this is less common.  You may also get pneumonia after another illness, such as a cold, flu, or bronchitis. Those most at risk include older adults, smokers, and people with long-term (chronic) health problems or weak immune systems.  Healthy lungs  · Air travels in and out of the lungs through tubes called airways.  · The tubes branch into smaller passages called bronchioles. These end in tiny sacs called alveoli.  · Blood vessels surrounding the alveoli take oxygen into the bloodstream. At the same time, the alveoli remove carbon dioxide (a waste gas) from the blood. The carbon dioxide is then exhaled.  When you have pneumonia  · Pneumonia causes the bronchioles and the alveoli to fill with excess mucus and become inflamed.  · Your bodys response may be to cough. This can help clear out the fluid.  · The fluid (or mucus) you cough up may appear green or dark yellow.  · The excess mucus may make you feel short of breath.  · The inflammation and infection may give you a fever.  What are the symptoms?  Symptoms of pneumonia can come without warning. At first, you may think you have a cold or flu. But symptoms may get worse quickly, turning into pneumonia. Symptoms can be different for bacterial and viral pneumonia. Common symptoms may include the following:  · Severe cough with green or yellow mucus that doesn't improve or that gets worse  · Fever and chills  · Nausea, vomiting or diarrhea  · Shortness of breath with normal daily activities  · Increased heart rate  · Chest pain or discomfort when breathing in or coughing  · Headache  · Excessive sweating and clammy skin  Date Last Reviewed: 12/1/2016  © 3789-5329 [employer]. [address] [place] [address], [place] [address]. All rights reserved. This information is not intended as a substitute for  professional medical care. Always follow your healthcare professional's instructions.

## 2020-11-02 ENCOUNTER — OFFICE VISIT (OUTPATIENT)
Dept: URGENT CARE | Facility: CLINIC | Age: 85
End: 2020-11-02
Payer: MEDICARE

## 2020-11-02 VITALS
WEIGHT: 131 LBS | SYSTOLIC BLOOD PRESSURE: 131 MMHG | HEART RATE: 91 BPM | RESPIRATION RATE: 19 BRPM | DIASTOLIC BLOOD PRESSURE: 83 MMHG | HEIGHT: 58 IN | OXYGEN SATURATION: 97 % | BODY MASS INDEX: 27.5 KG/M2 | TEMPERATURE: 98 F

## 2020-11-02 DIAGNOSIS — E86.0 DEHYDRATION: ICD-10-CM

## 2020-11-02 DIAGNOSIS — R42 LIGHTHEADED: Primary | ICD-10-CM

## 2020-11-02 DIAGNOSIS — R31.21 ASYMPTOMATIC MICROSCOPIC HEMATURIA: ICD-10-CM

## 2020-11-02 LAB
BILIRUB UR QL STRIP: NEGATIVE
GLUCOSE SERPL-MCNC: 82 MG/DL (ref 70–110)
GLUCOSE UR QL STRIP: NEGATIVE
KETONES UR QL STRIP: NEGATIVE
LEUKOCYTE ESTERASE UR QL STRIP: NEGATIVE
PH, POC UA: 6 (ref 5–8)
POC ANION GAP: 17 MMOL/L (ref 10–20)
POC BLOOD, URINE: POSITIVE
POC BUN: 13 MMOL/L (ref 8–26)
POC CHLORIDE: 104 MMOL/L (ref 98–109)
POC CREATININE: 0.6 MG/DL (ref 0.6–1.3)
POC HEMATOCRIT: 42 %PCV (ref 37–47)
POC HEMOGLOBIN: 14.3 G/DL (ref 12.5–16)
POC ICA: 1.13 MMOL/L (ref 1.12–1.32)
POC NITRATES, URINE: NEGATIVE
POC POTASSIUM: 4 MMOL/L (ref 3.5–4.9)
POC SODIUM: 140 MMOL/L (ref 138–146)
POC TCO2: 24 MMOL/L (ref 24–29)
PROT UR QL STRIP: NEGATIVE
SP GR UR STRIP: 1.02 (ref 1–1.03)
UROBILINOGEN UR STRIP-ACNC: ABNORMAL (ref 0.1–1.1)

## 2020-11-02 PROCEDURE — 93010 EKG 12-LEAD: ICD-10-PCS | Mod: S$GLB,,, | Performed by: INTERNAL MEDICINE

## 2020-11-02 PROCEDURE — 81003 POCT URINALYSIS, DIPSTICK, AUTOMATED, W/O SCOPE: ICD-10-PCS | Mod: QW,S$GLB,, | Performed by: PHYSICIAN ASSISTANT

## 2020-11-02 PROCEDURE — 93010 ELECTROCARDIOGRAM REPORT: CPT | Mod: S$GLB,,, | Performed by: INTERNAL MEDICINE

## 2020-11-02 PROCEDURE — 80047 BASIC METABLC PNL IONIZED CA: CPT | Mod: QW,S$GLB,, | Performed by: PHYSICIAN ASSISTANT

## 2020-11-02 PROCEDURE — 99214 PR OFFICE/OUTPT VISIT, EST, LEVL IV, 30-39 MIN: ICD-10-PCS | Mod: 25,S$GLB,, | Performed by: PHYSICIAN ASSISTANT

## 2020-11-02 PROCEDURE — 81003 URINALYSIS AUTO W/O SCOPE: CPT | Mod: QW,S$GLB,, | Performed by: PHYSICIAN ASSISTANT

## 2020-11-02 PROCEDURE — 93005 ELECTROCARDIOGRAM TRACING: CPT | Mod: S$GLB,,, | Performed by: PHYSICIAN ASSISTANT

## 2020-11-02 PROCEDURE — 93005 EKG 12-LEAD: ICD-10-PCS | Mod: S$GLB,,, | Performed by: PHYSICIAN ASSISTANT

## 2020-11-02 PROCEDURE — 99214 OFFICE O/P EST MOD 30 MIN: CPT | Mod: 25,S$GLB,, | Performed by: PHYSICIAN ASSISTANT

## 2020-11-02 PROCEDURE — 71046 X-RAY EXAM CHEST 2 VIEWS: CPT | Mod: FY,S$GLB,, | Performed by: RADIOLOGY

## 2020-11-02 PROCEDURE — 80047 POCT CHEMISTRY PANEL: ICD-10-PCS | Mod: QW,S$GLB,, | Performed by: PHYSICIAN ASSISTANT

## 2020-11-02 PROCEDURE — 87086 URINE CULTURE/COLONY COUNT: CPT | Mod: HCNC

## 2020-11-02 PROCEDURE — 71046 XR CHEST PA AND LATERAL: ICD-10-PCS | Mod: FY,S$GLB,, | Performed by: RADIOLOGY

## 2020-11-02 NOTE — PROGRESS NOTES
"Subjective:       Patient ID: Samantha Balderrama is a 89 y.o. female.    Vitals:  height is 4' 10" (1.473 m) and weight is 59.4 kg (131 lb). Her oral temperature is 98.2 °F (36.8 °C). Her blood pressure is 131/83 and her pulse is 91. Her respiration is 19 and oxygen saturation is 97%.     Chief Complaint: Dizziness    This is a 89-year-old female who presents to the clinic complaining of an episode of lightheadedness yesterday. States that she drank a hurricane alcoholic drink at lunch. Later in the evening, she was laying in bed. She went to stand up and felt dizzy which she described as lightheaded - she immediately fell down and hit a small decorative table due to the lightheaded feeling. This lasted just a few seconds and she was able to stand up and walk again after this. She denies any LOC, head injury, N/V. Denies chest pain or palpitations. She denies any bony pain or musculoskeletal pain from fall. She states her lightheadness has mostly resolved. She feel slightly lightheaded today. But this only occurs when going from sitting to standing. And only lasts a few seconds. States that she has not had any water today and barely drank water yesterday.  She did have pneumonia 1 month ago. She has felt some shortness of breath since that time but it does not seem to be worsening. Mostly when she exerts herself or walks around. Described as mild. No shortness of breath at this time when sitting and walking around the clinic.    Dizziness:   Chronicity:  New  Onset:  Yesterday  Progression since onset:  Rapidly improving and resolved  Pain Scale:  0/10  Severity:  Mild  Duration:  Very brief  Dizziness characteristics:  Lightheaded/impending faintno hearing loss, no ear congestion, no ear pain, no fever, no headaches, no tinnitus, no nausea, no vomiting, no diaphoresis, no aural fullness, no weakness, no visual disturbances, no light-headedness, no syncope, no palpitations, no panic, no facial weakness, no slurred " speech, no numbness in extremities and no chest pain.  Aggravated by:  Getting up  Treatments tried:  Rest  Improvements on treatment:  Resolvedno strokes, no cardiac surgery, no neurologic disease, no head trauma, no balance testing, no ear trauma, no ear surgery, no head trauma, no ear infections, no anxiety, no ear tubes, no environmental allergies, no MRI head and no CT head.      Constitution: Negative for chills, sweating, fatigue and fever.   HENT: Negative for ear pain, tinnitus, hearing loss, congestion and sore throat.    Neck: Negative for painful lymph nodes.   Cardiovascular: Negative for chest pain, leg swelling, palpitations and passing out.   Eyes: Negative for double vision and blurred vision.   Respiratory: Negative for cough and shortness of breath.    Gastrointestinal: Negative for nausea, vomiting and diarrhea.   Genitourinary: Negative for dysuria, frequency, urgency and history of kidney stones.   Musculoskeletal: Negative for joint pain, joint swelling, muscle cramps and muscle ache.   Skin: Negative for color change, pale, rash and bruising.   Allergic/Immunologic: Negative for environmental allergies and seasonal allergies.   Neurological: Positive for dizziness. Negative for history of vertigo, light-headedness, passing out and headaches.   Hematologic/Lymphatic: Negative for swollen lymph nodes.   Psychiatric/Behavioral: Negative for nervous/anxious, sleep disturbance and depression. The patient is not nervous/anxious.        Objective:      Physical Exam   Constitutional: She is oriented to person, place, and time. She appears well-developed. She is cooperative.  Non-toxic appearance. She does not appear ill. No distress.      Comments:Very pleasant, happy, smiling, conversing, oriented.  Appears to feel well.  Not toxic or ill appearing, no acute distress   HENT:   Head: Normocephalic and atraumatic.   Ears:   Right Ear: Hearing, tympanic membrane, external ear and ear canal normal.    Left Ear: Hearing, tympanic membrane, external ear and ear canal normal.   Nose: Nose normal. No mucosal edema, rhinorrhea or nasal deformity. No epistaxis. Right sinus exhibits no maxillary sinus tenderness and no frontal sinus tenderness. Left sinus exhibits no maxillary sinus tenderness and no frontal sinus tenderness.   Mouth/Throat: Uvula is midline, oropharynx is clear and moist and mucous membranes are normal. No trismus in the jaw. Normal dentition. No uvula swelling. No posterior oropharyngeal erythema.   Eyes: Conjunctivae and lids are normal. Right eye exhibits no discharge. Left eye exhibits no discharge. No scleral icterus.   Neck: Trachea normal, normal range of motion, full passive range of motion without pain and phonation normal. Neck supple. No spinous process tenderness, no muscular tenderness and no pain with movement present. No neck rigidity. No edema and normal range of motion present.   Cardiovascular: Normal rate, regular rhythm and normal pulses.   Murmur (Chronic) heard.   Systolic murmur is present.  Pulmonary/Chest: Effort normal and breath sounds normal. No respiratory distress.   Abdominal: Soft. Normal appearance and bowel sounds are normal. She exhibits no distension, no pulsatile midline mass and no mass. There is no abdominal tenderness.      Comments: No cva ttp   Musculoskeletal: Normal range of motion.         General: No deformity.   Neurological: She is alert and oriented to person, place, and time. She has normal motor skills, normal sensation and intact cranial nerves. She displays no weakness, no tremor and facial symmetry. No cranial nerve deficit or sensory deficit. She exhibits normal muscle tone. She has a normal Finger-Nose-Finger Test. She shows no pronator drift. Gait and coordination normal. Coordination normal. GCS eye subscore is 4. GCS verbal subscore is 5. GCS motor subscore is 6.      Comments: Patient alert and oriented x3.  No facial droop, PERRL, EOMI.  No  nystagmus.  Finger-to-nose normal, gait normal, no ataxia.  Patient denies any dizziness at this time or lightheadedness when ambulating around the clinic.  She is ambulating without difficulty.  She uses a walker which she uses at baseline.       Skin: Skin is warm, dry, intact, not diaphoretic and not pale. Psychiatric: Her speech is normal and behavior is normal. Mood, judgment and thought content normal.   Nursing note and vitals reviewed.      EKG: NSR, rate of 85bpm. No STEMI. Nonspecific t wave abnormalities. When compared to EKG from 6/7/20 there is no significant change.     X-ray Chest Pa And Lateral    Result Date: 11/2/2020  EXAMINATION: XR CHEST PA AND LATERAL CLINICAL HISTORY: Dizziness and giddiness TECHNIQUE: PA and lateral views of the chest were performed. COMPARISON: 02/12/2020, 07/02/2019, and 12/19/2018 FINDINGS: Surgical clips from previous bilateral mastectomies.  Continued relative volume loss of the right lung compared to the left.  Scarring and/or atelectasis along the right hilum and medial aspect of the upper hemithorax unchanged.  Mild thickening or fluid along the right minor fissure.  No acute consolidation, pleural effusion, or pneumothorax.  Cardiac silhouette is normal in size.     Stable chronic findings in the chest with no acute cardiopulmonary process seen. Electronically signed by: Zoraida Willis Date:    11/02/2020 Time:    10:38    Vitals:    11/02/20 1035 11/02/20 1041 11/02/20 1042 11/02/20 1043   BP:  129/82 131/82 131/83   BP Location:  Right arm Right arm Right arm   Patient Position:  Lying Sitting Standing   Pulse: 83 83 89 91   Resp:       Temp:       TempSrc:       SpO2:  96% 95% 97%   Weight:       Height:               Results for orders placed or performed in visit on 11/02/20   POCT Urinalysis, Dipstick, Automated, W/O Scope   Result Value Ref Range    POC Blood, Urine Positive (A) Negative    POC Bilirubin, Urine Negative Negative    POC Urobilinogen, Urine norm  0.1 - 1.1    POC Ketones, Urine Negative Negative    POC Protein, Urine Negative Negative    POC Nitrates, Urine Negative Negative    POC Glucose, Urine Negative Negative    pH, UA 6.0 5 - 8    POC Specific Gravity, Urine 1.020 1.003 - 1.029    POC Leukocytes, Urine Negative Negative   POCT Chemistry Panel   Result Value Ref Range    POC Sodium 140 138 - 146 MMOL/L    POC Potassium 4.0 3.5 - 4.9 MMOL/L    POC Chloride 104 98 - 109 MMOL/L    POC BUN 13 8 - 26 MMOL/L    POC Glucose 82 70 - 110 MG/DL    POC Creatinine 0.6 0.6 - 1.3 mg/dL    POC iCA 1.13 1.12 - 1.32 MMOL/L    POC TCO2 24 24 - 29 MMOL/L    POC Hematocrit 42 37 - 47 %PCV    POC Hemoglobin 14.3 12.5 - 16 g/dL    POC Anion Gap 17 10.0 - 20 MMOL/L       Assessment:       1. Lightheaded    2. Asymptomatic microscopic hematuria    3. Dehydration        Plan:         This is an elderly 89 yr old female presenting to urgent care c/o lightheaded episode yesterday. This happened a little while after drinking a hurricane alcoholic drink. She did not drink any water that she remembers yesterday or today. Requesting water in clinic at this time b/c she is thirsty.   She denies any lightheadedness at this time.  No chest or shortness of breath at this time.  No palpitations.  She really asymptomatic right now. She is ambulating around the clinic and denies any symptoms on ambulation.   Her Chem 8 is normal. UA with small amount of blood, but this appear chronic and seen in other UAs recently.  Will send for culture to rule out infection.  The patient has no urinary symptoms at this time.  Her chest x-ray appears much improved from the last x-ray.  No new pneumonia or focal consolidation or worsened change.  Her EKG appears unchanged from previous.  No appreciated arrhythmia or STEMI.    Her lightheadedness is likely due to dehydration as she drank alcohol and did not drink any water, stood up quickly causing her to feel lightheaded.  I recommend that she drink plenty  of fluids, rest, take caution to sit up slowly and stand up slowly, keep walker by the bed.  She lives with the family member and feels safe at home and this family member can watch her.   I did discuss with patient and family member that I cannot rule out all emergent causes of the patient's lightheadedness in the urgent care setting.  If her symptoms return, change, worsen or she has any new concerning symptoms, I do recommend that she go to the emergency room.  Otherwise please follow-up with primary care within 2 days for evaluation.  Patient and family expressed full understanding and are happy with this plan.    Lightheaded  -     POCT Urinalysis, Dipstick, Automated, W/O Scope  -     X-Ray Chest PA And Lateral; Future; Expected date: 11/02/2020  -     POCT Chemistry Panel  -     IN OFFICE EKG 12-LEAD (to Muse)  -     Orthostatic vital signs  -     Urine culture    Asymptomatic microscopic hematuria  -     Urine culture    Dehydration         Labs reviewed, pertinent imaging reviewed, previous medical records, medical history, surgical history, social history, family history reviewed.    Patient Instructions   In the urgent care setting, I cannot rule out all emergencies. If you would like emergent evaluation, go to the emergency department immediately.    Please drink plenty of fluids  Please rest  Have someone stay with you at home  Please keep walker by bed  Sit up slowly and stand slowly  Follow up with primary care within 2 days  If your symptoms return, worsen, change or become concerning go straight to the emergency department      Please arrange follow up with your primary medical clinic as soon as possible. You must understand that you've received an Urgent Care treatment only and that you may be released before all of your medical problems are known or treated. You, the patient, will arrange for follow up as instructed. If your symptoms worsen or fail to improve you should go to the Emergency  Room.  WE CANNOT RULE OUT ALL POSSIBLE CAUSES OF YOUR SYMPTOMS IN THE URGENT CARE SETTING PLEASE GO TO THE ER IF YOU FEELS YOUR CONDITION IS WORSENING OR YOU WOULD LIKE EMERGENT EVALUATION.    Please return here or go to the Emergency Department for any concerns or worsening of condition.  If you were prescribed antibiotics, please take them to completion.  If you were prescribed a narcotic medication, do not drive or operate heavy equipment or machinery while taking these medications.  Please follow up with your primary care doctor or specialist as needed.    If you  smoke, please stop smoking.    Dehydration (Adult)  Dehydration occurs when your body loses too much fluid. This may be the result of prolonged vomiting or diarrhea, excessive sweating, or a high fever. It may also happen if you dont drink enough fluid when youre sick or out in the heat. Misuse of diuretics (water pills) can also be a cause.  Symptoms include thirst and decreased urine output. You may also feel dizzy, weak, fatigued, or very drowsy. The diet described below is usually enough to treat dehydration. In some cases, you may need medicine.  Home care  · Drink at least 12 8-ounce glasses of fluid every day to resolve the dehydration. Fluid may include water; orange juice; lemonade; apple, grape, or cranberry juice; clear fruit drinks; electrolyte replacement and sports drinks; and teas and coffee without caffeine. If you have been diagnosed with a kidney disease, ask your doctor how much and what types of fluids you should drink to prevent dehydration. If you have kidney disease, fluid can build up in the body. This can be dangerous to your health.  · If you have a fever, muscle aches, or a headache as a result of a cold or flu, you may take acetaminophen or ibuprofen, unless another medicine was prescribed. If you have chronic liver or kidney disease, or have ever had a stomach ulcer or gastrointestinal bleeding, talk with your health care  provider before using these medicines. Don't take aspirin if you are younger than 18 and have a fever. Aspirin raises the chance for severe liver injury.  Follow-up care  Follow up with your health care provider, or as advised.  When to seek medical advice  Call your health care provider right away if any of these occur:  · Continued vomiting  · Frequent diarrhea (more than 5 times a day); blood (red or black color) or mucus in diarrhea  · Blood in vomit or stool  · Swollen abdomen or increasing abdominal pain  · Weakness, dizziness, or fainting  · Unusual drowsiness or confusion  · Reduced urine output or extreme thirst  · Fever of 100.4°F (34°C) or higher  Date Last Reviewed: 5/31/2015  © 2336-7174 [employer] [address] [place] [address], [place] [address]. All rights reserved. This information is not intended as a substitute for professional medical care. Always follow your healthcare professional's instructions.

## 2020-11-02 NOTE — PATIENT INSTRUCTIONS
In the urgent care setting, I cannot rule out all emergencies. If you would like emergent evaluation, go to the emergency department immediately.    Please drink plenty of fluids  Please rest  Have someone stay with you at home  Please keep walker by bed  Sit up slowly and stand slowly  Follow up with primary care within 2 days  If your symptoms return, worsen, change or become concerning go straight to the emergency department      Please arrange follow up with your primary medical clinic as soon as possible. You must understand that you've received an Urgent Care treatment only and that you may be released before all of your medical problems are known or treated. You, the patient, will arrange for follow up as instructed. If your symptoms worsen or fail to improve you should go to the Emergency Room.  WE CANNOT RULE OUT ALL POSSIBLE CAUSES OF YOUR SYMPTOMS IN THE URGENT CARE SETTING PLEASE GO TO THE ER IF YOU FEELS YOUR CONDITION IS WORSENING OR YOU WOULD LIKE EMERGENT EVALUATION.    Please return here or go to the Emergency Department for any concerns or worsening of condition.  If you were prescribed antibiotics, please take them to completion.  If you were prescribed a narcotic medication, do not drive or operate heavy equipment or machinery while taking these medications.  Please follow up with your primary care doctor or specialist as needed.    If you  smoke, please stop smoking.    Dehydration (Adult)  Dehydration occurs when your body loses too much fluid. This may be the result of prolonged vomiting or diarrhea, excessive sweating, or a high fever. It may also happen if you dont drink enough fluid when youre sick or out in the heat. Misuse of diuretics (water pills) can also be a cause.  Symptoms include thirst and decreased urine output. You may also feel dizzy, weak, fatigued, or very drowsy. The diet described below is usually enough to treat dehydration. In some cases, you may need medicine.  Home  care  · Drink at least 12 8-ounce glasses of fluid every day to resolve the dehydration. Fluid may include water; orange juice; lemonade; apple, grape, or cranberry juice; clear fruit drinks; electrolyte replacement and sports drinks; and teas and coffee without caffeine. If you have been diagnosed with a kidney disease, ask your doctor how much and what types of fluids you should drink to prevent dehydration. If you have kidney disease, fluid can build up in the body. This can be dangerous to your health.  · If you have a fever, muscle aches, or a headache as a result of a cold or flu, you may take acetaminophen or ibuprofen, unless another medicine was prescribed. If you have chronic liver or kidney disease, or have ever had a stomach ulcer or gastrointestinal bleeding, talk with your health care provider before using these medicines. Don't take aspirin if you are younger than 18 and have a fever. Aspirin raises the chance for severe liver injury.  Follow-up care  Follow up with your health care provider, or as advised.  When to seek medical advice  Call your health care provider right away if any of these occur:  · Continued vomiting  · Frequent diarrhea (more than 5 times a day); blood (red or black color) or mucus in diarrhea  · Blood in vomit or stool  · Swollen abdomen or increasing abdominal pain  · Weakness, dizziness, or fainting  · Unusual drowsiness or confusion  · Reduced urine output or extreme thirst  · Fever of 100.4°F (34°C) or higher  Date Last Reviewed: 5/31/2015  © 3130-6431 [employer]. [address] [place] [address], [place] [address]. All rights reserved. This information is not intended as a substitute for professional medical care. Always follow your healthcare professional's instructions.

## 2020-11-04 LAB — BACTERIA UR CULT: NORMAL

## 2020-11-06 ENCOUNTER — TELEPHONE (OUTPATIENT)
Dept: URGENT CARE | Facility: CLINIC | Age: 85
End: 2020-11-06

## 2020-12-07 ENCOUNTER — TELEPHONE (OUTPATIENT)
Dept: HEMATOLOGY/ONCOLOGY | Facility: CLINIC | Age: 85
End: 2020-12-07

## 2020-12-07 NOTE — TELEPHONE ENCOUNTER
----- Message from Silvano Anderson sent at 12/7/2020  2:51 PM CST -----  ..Type:  Patient Returning Call    Who Called:pt   Who Left Message for Mallorie   Does the patient know what this is regarding?: appt   Would the patient rather a call back or a response via [facility]? Call back   Best Call Back Number: [phone]  Additional Information: pt is requesting a call from nurse to r/s her appt

## 2020-12-07 NOTE — TELEPHONE ENCOUNTER
----- Message from Roxanna Gannon sent at 12/7/2020  4:13 PM CST -----  Contact: Self   Patient is returning a phone call.  Who left a message for the patient: June Thacker LPN  Does patient know what this is regarding:    Comments:

## 2020-12-09 ENCOUNTER — LAB VISIT (OUTPATIENT)
Dept: LAB | Facility: HOSPITAL | Age: 85
End: 2020-12-09
Attending: INTERNAL MEDICINE
Payer: MEDICARE

## 2020-12-09 ENCOUNTER — OFFICE VISIT (OUTPATIENT)
Dept: HEMATOLOGY/ONCOLOGY | Facility: CLINIC | Age: 85
End: 2020-12-09
Payer: MEDICARE

## 2020-12-09 VITALS
SYSTOLIC BLOOD PRESSURE: 103 MMHG | WEIGHT: 132.06 LBS | BODY MASS INDEX: 27.72 KG/M2 | DIASTOLIC BLOOD PRESSURE: 78 MMHG | OXYGEN SATURATION: 98 % | TEMPERATURE: 98 F | HEIGHT: 58 IN | HEART RATE: 81 BPM

## 2020-12-09 DIAGNOSIS — Z01.84 ENCOUNTER FOR ANTIBODY RESPONSE EXAMINATION: ICD-10-CM

## 2020-12-09 DIAGNOSIS — I70.0 ATHEROSCLEROSIS OF AORTA: ICD-10-CM

## 2020-12-09 DIAGNOSIS — Z85.89 HISTORY OF SECONDARY LUNG CANCER: Chronic | ICD-10-CM

## 2020-12-09 DIAGNOSIS — Z85.3 HISTORY OF BILATERAL BREAST CANCER: Primary | Chronic | ICD-10-CM

## 2020-12-09 PROCEDURE — 3288F FALL RISK ASSESSMENT DOCD: CPT | Mod: HCNC,CPTII,S$GLB,ICN | Performed by: INTERNAL MEDICINE

## 2020-12-09 PROCEDURE — 1101F PT FALLS ASSESS-DOCD LE1/YR: CPT | Mod: HCNC,CPTII,S$GLB,ICN | Performed by: INTERNAL MEDICINE

## 2020-12-09 PROCEDURE — 99214 OFFICE O/P EST MOD 30 MIN: CPT | Mod: HCNC,S$GLB,ICN, | Performed by: INTERNAL MEDICINE

## 2020-12-09 PROCEDURE — 1159F MED LIST DOCD IN RCRD: CPT | Mod: HCNC,S$GLB,ICN, | Performed by: INTERNAL MEDICINE

## 2020-12-09 PROCEDURE — 1101F PR PT FALLS ASSESS DOC 0-1 FALLS W/OUT INJ PAST YR: ICD-10-PCS | Mod: HCNC,CPTII,S$GLB,ICN | Performed by: INTERNAL MEDICINE

## 2020-12-09 PROCEDURE — 99214 PR OFFICE/OUTPT VISIT, EST, LEVL IV, 30-39 MIN: ICD-10-PCS | Mod: HCNC,S$GLB,ICN, | Performed by: INTERNAL MEDICINE

## 2020-12-09 PROCEDURE — 36415 COLL VENOUS BLD VENIPUNCTURE: CPT | Mod: HCNC

## 2020-12-09 PROCEDURE — 1126F AMNT PAIN NOTED NONE PRSNT: CPT | Mod: HCNC,S$GLB,ICN, | Performed by: INTERNAL MEDICINE

## 2020-12-09 PROCEDURE — 1159F PR MEDICATION LIST DOCUMENTED IN MEDICAL RECORD: ICD-10-PCS | Mod: HCNC,S$GLB,ICN, | Performed by: INTERNAL MEDICINE

## 2020-12-09 PROCEDURE — 99999 PR PBB SHADOW E&M-EST. PATIENT-LVL IV: ICD-10-PCS | Mod: PBBFAC,HCNC,, | Performed by: INTERNAL MEDICINE

## 2020-12-09 PROCEDURE — 99999 PR PBB SHADOW E&M-EST. PATIENT-LVL IV: CPT | Mod: PBBFAC,HCNC,, | Performed by: INTERNAL MEDICINE

## 2020-12-09 PROCEDURE — 99499 RISK ADDL DX/OHS AUDIT: ICD-10-PCS | Mod: S$GLB,,, | Performed by: INTERNAL MEDICINE

## 2020-12-09 PROCEDURE — 3288F PR FALLS RISK ASSESSMENT DOCUMENTED: ICD-10-PCS | Mod: HCNC,CPTII,S$GLB,ICN | Performed by: INTERNAL MEDICINE

## 2020-12-09 PROCEDURE — 99499 UNLISTED E&M SERVICE: CPT | Mod: S$GLB,,, | Performed by: INTERNAL MEDICINE

## 2020-12-09 PROCEDURE — 86769 SARS-COV-2 COVID-19 ANTIBODY: CPT | Mod: HCNC

## 2020-12-09 PROCEDURE — 1126F PR PAIN SEVERITY QUANTIFIED, NO PAIN PRESENT: ICD-10-PCS | Mod: HCNC,S$GLB,ICN, | Performed by: INTERNAL MEDICINE

## 2020-12-09 NOTE — PROGRESS NOTES
Subjective:       Patient ID: Samantha Balderrama is a 89 y.o. female.    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  ECOG status 1    Past Medical History:   Diagnosis Date    Allergy     Basal cell carcinoma 2003    Dr. 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 SAQIB Nielsen/ right side of nose and forhead also.     Family History   Problem Relation Age of Onset    Heart failure Mother     Hypertension Mother     Heart disease Mother     Heart failure Father     Heart disease Father     Heart failure Brother     Hypertension Brother     Heart disease Brother     Hypertension Sister     Hypertension Sister     Heart disease Sister     Cancer Maternal Aunt     Hypertension Maternal Uncle     Hypertension Paternal Aunt     Hypertension Paternal Uncle     Asthma Cousin     Cancer Cousin     Amblyopia Neg Hx     Blindness Neg Hx     Cataracts Neg Hx     Diabetes Neg Hx     Glaucoma Neg Hx     Macular degeneration Neg Hx     Retinal detachment Neg Hx     Strabismus Neg Hx     Stroke Neg Hx     Thyroid disease Neg Hx     Melanoma Neg Hx     Psoriasis Neg Hx     Lupus Neg Hx      Social History     Socioeconomic History    Marital status: Single     Spouse name: Not on file    Number of children: Not on file    Years of education: 12 + 4 + 2    Highest education level: Master's degree (e.g., MA, MS, Patti, MEd, MSW, ANT)   Occupational History    Occupation:  Retired in 1980     Employer: [employer]   Social Needs    Financial resource strain: Not hard at all    Food insecurity     Worry: Never true      Inability: Never true    Transportation needs     Medical: No     Non-medical: No   Tobacco Use    Smoking status: Never Smoker    Smokeless tobacco: Never Used   Substance and Sexual Activity    Alcohol use: Yes     Alcohol/week: 0.0 standard drinks     Binge frequency: Never     Comment: social    Drug use: No    Sexual activity: Not Currently   Lifestyle    Physical activity     Days per week: 0 days     Minutes per session: 0 min    Stress: Not at all   Relationships    Social connections     Talks on phone: More than three times a week     Gets together: More than three times a week     Attends Confucianism service: More than 4 times per year     Active member of club or organization: Yes     Attends meetings of clubs or organizations: More than 4 times per year     Relationship status: Never    Other Topics Concern    Are you pregnant or think you may be? Not Asked    Breast-feeding Not Asked   Social History Narrative    Lives in her own condominium by herself.     Past Surgical History:   Procedure Laterality Date    BREAST SURGERY Right 1990    x2- Mastectomy    BREAST SURGERY Left     Mastectomy    CATARACT EXTRACTION Bilateral 2012    Dr. Crocker    EYE SURGERY      MASTECTOMY, RADICAL Left 1988       Labs:  Lab Results   Component Value Date    WBC 5.14 06/07/2020    HGB 13.7 06/07/2020    HCT 43.7 06/07/2020    MCV 90 06/07/2020     06/07/2020     BMP  Lab Results   Component Value Date     06/07/2020    K 3.4 (L) 06/07/2020     06/07/2020    CO2 20 (L) 06/07/2020    BUN 16 06/07/2020    CREATININE 0.7 06/07/2020    CALCIUM 9.0 06/07/2020    ANIONGAP 14 06/07/2020    ESTGFRAFRICA >60.0 06/07/2020    EGFRNONAA >60.0 06/07/2020     Lab Results   Component Value Date    ALT 10 06/07/2020    AST 18 06/07/2020    ALKPHOS 51 (L) 06/07/2020    BILITOT 0.4 06/07/2020       No results found for: IRON, TIBC, FERRITIN, SATURATEDIRO  Lab Results   Component Value Date     SPVDLMEM68 606 10/11/2011     No results found for: FOLATE  Lab Results   Component Value Date    TSH 2.012 06/07/2020         Review of Systems   Constitutional: Negative for activity change, appetite change, chills, diaphoresis, fatigue, fever and unexpected weight change.   HENT: Negative for congestion, dental problem, drooling, ear discharge, ear pain, facial swelling, hearing loss, mouth sores, nosebleeds, postnasal drip, rhinorrhea, sinus pressure, sneezing, sore throat, tinnitus, trouble swallowing and voice change.    Eyes: Negative for photophobia, pain, discharge, redness, itching and visual disturbance.   Respiratory: Negative for cough, choking, chest tightness, shortness of breath, wheezing and stridor.    Cardiovascular: Negative for chest pain, palpitations and leg swelling.   Gastrointestinal: Negative for abdominal distention, abdominal pain, anal bleeding, blood in stool, constipation, diarrhea, nausea, rectal pain and vomiting.   Endocrine: Negative for cold intolerance, heat intolerance, polydipsia, polyphagia and polyuria.   Genitourinary: Negative for decreased urine volume, difficulty urinating, dyspareunia, dysuria, enuresis, flank pain, frequency, genital sores, hematuria, menstrual problem, pelvic pain, urgency, vaginal bleeding, vaginal discharge and vaginal pain.   Musculoskeletal: Negative for arthralgias, back pain, gait problem, joint swelling, myalgias, neck pain and neck stiffness.   Skin: Negative for color change, pallor and rash.   Allergic/Immunologic: Negative for environmental allergies, food allergies and immunocompromised state.   Neurological: Negative for dizziness, tremors, seizures, syncope, facial asymmetry, speech difficulty, weakness, light-headedness, numbness and headaches.   Hematological: Negative for adenopathy. Does not bruise/bleed easily.   Psychiatric/Behavioral: Positive for dysphoric mood. Negative for agitation, behavioral problems, confusion, decreased  concentration, hallucinations, self-injury, sleep disturbance and suicidal ideas. The patient is nervous/anxious. The patient is not hyperactive.        Objective:      Physical Exam  Vitals signs reviewed.   Constitutional:       General: She is not in acute distress.     Appearance: She is well-developed. She is not diaphoretic.   HENT:      Head: Normocephalic and atraumatic.      Right Ear: External ear normal.      Left Ear: External ear normal.      Nose: Nose normal.      Right Sinus: No maxillary sinus tenderness or frontal sinus tenderness.      Left Sinus: No maxillary sinus tenderness or frontal sinus tenderness.      Mouth/Throat:      Pharynx: No oropharyngeal exudate.   Eyes:      General: Lids are normal. No scleral icterus.        Right eye: No discharge.         Left eye: No discharge.      Conjunctiva/sclera: Conjunctivae normal.      Right eye: Right conjunctiva is not injected. No hemorrhage.     Left eye: Left conjunctiva is not injected. No hemorrhage.     Pupils: Pupils are equal, round, and reactive to light.   Neck:      Musculoskeletal: Normal range of motion and neck supple.      Thyroid: No thyromegaly.      Vascular: No JVD.      Trachea: No tracheal deviation.   Cardiovascular:      Rate and Rhythm: Normal rate.   Pulmonary:      Effort: Pulmonary effort is normal. No respiratory distress.      Breath sounds: No stridor.   Chest:      Chest wall: No tenderness.   Abdominal:      General: Bowel sounds are normal. There is no distension.      Palpations: Abdomen is soft. There is no hepatomegaly, splenomegaly or mass.      Tenderness: There is no abdominal tenderness. There is no rebound.   Musculoskeletal: Normal range of motion.         General: No tenderness.   Lymphadenopathy:      Cervical: No cervical adenopathy.      Upper Body:      Right upper body: No supraclavicular adenopathy.      Left upper body: No supraclavicular adenopathy.   Skin:     General: Skin is dry.      Findings:  No erythema or rash.   Neurological:      Mental Status: She is alert and oriented to person, place, and time.      Cranial Nerves: No cranial nerve deficit.      Coordination: Coordination normal.   Psychiatric:         Behavior: Behavior normal.         Thought Content: Thought content normal.         Judgment: Judgment normal.             Assessment:      1. History of bilateral breast cancer    2. Encounter for antibody response examination    3. History of secondary lung cancer    4. Atherosclerosis of aorta           Plan:     Recent chest x-ray in October 2020 no active disease.  Reviewed laboratory studies done recently reported increased alcohol level secondary to cough syrup that patient was taking.  She does feel that she may have had coronavirus when she visit to [facility] earlier this year.  Will check antibody responsive communicate results to her follow-up with her nurse practitioner in approximately 6 months.  Patient has been given flu shot in strongly urged that she have corona vaccine when available        Kirk Toney Jr, MD FACP

## 2020-12-10 LAB — SARS-COV-2 IGG SERPLBLD QL IA.RAPID: NEGATIVE

## 2020-12-14 DIAGNOSIS — R92.0 MAMMOGRAPHIC MICROCALCIFICATION FOUND ON DIAGNOSTIC IMAGING OF BREAST: ICD-10-CM

## 2020-12-14 DIAGNOSIS — Z85.3 HISTORY OF BILATERAL BREAST CANCER: Primary | ICD-10-CM

## 2021-01-22 ENCOUNTER — PATIENT MESSAGE (OUTPATIENT)
Dept: ADMINISTRATIVE | Facility: OTHER | Age: 86
End: 2021-01-22

## 2021-02-02 ENCOUNTER — HOSPITAL ENCOUNTER (INPATIENT)
Facility: HOSPITAL | Age: 86
LOS: 1 days | Discharge: HOME HEALTH | DRG: 066 | End: 2021-02-03
Attending: EMERGENCY MEDICINE | Admitting: PSYCHIATRY & NEUROLOGY
Payer: MEDICARE

## 2021-02-02 DIAGNOSIS — G45.9 TRANSIENT CEREBRAL ISCHEMIA, UNSPECIFIED TYPE: ICD-10-CM

## 2021-02-02 DIAGNOSIS — E78.2 MIXED HYPERLIPIDEMIA: ICD-10-CM

## 2021-02-02 DIAGNOSIS — Z01.89 ENCOUNTER FOR IMAGING TO SCREEN FOR METAL PRIOR TO MRI: ICD-10-CM

## 2021-02-02 DIAGNOSIS — I63.411 EMBOLIC STROKE INVOLVING RIGHT MIDDLE CEREBRAL ARTERY: ICD-10-CM

## 2021-02-02 DIAGNOSIS — I63.9 STROKE: ICD-10-CM

## 2021-02-02 PROBLEM — I63.311 THROMBOTIC STROKE INVOLVING RIGHT MIDDLE CEREBRAL ARTERY: Status: ACTIVE | Noted: 2021-02-02

## 2021-02-02 LAB
ALBUMIN SERPL BCP-MCNC: 3.8 G/DL (ref 3.5–5.2)
ALP SERPL-CCNC: 69 U/L (ref 55–135)
ALT SERPL W/O P-5'-P-CCNC: 12 U/L (ref 10–44)
ANION GAP SERPL CALC-SCNC: 12 MMOL/L (ref 8–16)
AST SERPL-CCNC: 16 U/L (ref 10–40)
BASOPHILS # BLD AUTO: 0.04 K/UL (ref 0–0.2)
BASOPHILS NFR BLD: 0.8 % (ref 0–1.9)
BILIRUB SERPL-MCNC: 0.4 MG/DL (ref 0.1–1)
BUN SERPL-MCNC: 11 MG/DL (ref 10–30)
CALCIUM SERPL-MCNC: 9.2 MG/DL (ref 8.7–10.5)
CHLORIDE SERPL-SCNC: 107 MMOL/L (ref 95–110)
CHOLEST SERPL-MCNC: 195 MG/DL (ref 120–199)
CHOLEST/HDLC SERPL: 3.5 {RATIO} (ref 2–5)
CO2 SERPL-SCNC: 22 MMOL/L (ref 23–29)
CREAT SERPL-MCNC: 0.6 MG/DL (ref 0.5–1.4)
CTP QC/QA: YES
DIFFERENTIAL METHOD: ABNORMAL
EOSINOPHIL # BLD AUTO: 0.1 K/UL (ref 0–0.5)
EOSINOPHIL NFR BLD: 1.4 % (ref 0–8)
ERYTHROCYTE [DISTWIDTH] IN BLOOD BY AUTOMATED COUNT: 14.4 % (ref 11.5–14.5)
EST. GFR  (AFRICAN AMERICAN): >60 ML/MIN/1.73 M^2
EST. GFR  (NON AFRICAN AMERICAN): >60 ML/MIN/1.73 M^2
ESTIMATED AVG GLUCOSE: 108 MG/DL (ref 68–131)
GLUCOSE SERPL-MCNC: 112 MG/DL (ref 70–110)
HBA1C MFR BLD: 5.4 % (ref 4–5.6)
HCT VFR BLD AUTO: 40.4 % (ref 37–48.5)
HDLC SERPL-MCNC: 55 MG/DL (ref 40–75)
HDLC SERPL: 28.2 % (ref 20–50)
HGB BLD-MCNC: 12.9 G/DL (ref 12–16)
IMM GRANULOCYTES # BLD AUTO: 0.02 K/UL (ref 0–0.04)
IMM GRANULOCYTES NFR BLD AUTO: 0.4 % (ref 0–0.5)
INR PPP: 0.9 (ref 0.8–1.2)
LDLC SERPL CALC-MCNC: 103 MG/DL (ref 63–159)
LYMPHOCYTES # BLD AUTO: 1.2 K/UL (ref 1–4.8)
LYMPHOCYTES NFR BLD: 23.2 % (ref 18–48)
MCH RBC QN AUTO: 27.6 PG (ref 27–31)
MCHC RBC AUTO-ENTMCNC: 31.9 G/DL (ref 32–36)
MCV RBC AUTO: 86 FL (ref 82–98)
MONOCYTES # BLD AUTO: 0.6 K/UL (ref 0.3–1)
MONOCYTES NFR BLD: 12 % (ref 4–15)
NEUTROPHILS # BLD AUTO: 3.1 K/UL (ref 1.8–7.7)
NEUTROPHILS NFR BLD: 62.2 % (ref 38–73)
NONHDLC SERPL-MCNC: 140 MG/DL
NRBC BLD-RTO: 0 /100 WBC
PLATELET # BLD AUTO: 181 K/UL (ref 150–350)
PMV BLD AUTO: 9.7 FL (ref 9.2–12.9)
POTASSIUM SERPL-SCNC: 3.4 MMOL/L (ref 3.5–5.1)
PROT SERPL-MCNC: 7.9 G/DL (ref 6–8.4)
PROTHROMBIN TIME: 10.1 SEC (ref 9–12.5)
RBC # BLD AUTO: 4.68 M/UL (ref 4–5.4)
SARS-COV-2 RDRP RESP QL NAA+PROBE: NEGATIVE
SODIUM SERPL-SCNC: 141 MMOL/L (ref 136–145)
TRIGL SERPL-MCNC: 185 MG/DL (ref 30–150)
TROPONIN I SERPL DL<=0.01 NG/ML-MCNC: 0.01 NG/ML (ref 0–0.03)
TSH SERPL DL<=0.005 MIU/L-ACNC: 2.36 UIU/ML (ref 0.4–4)
WBC # BLD AUTO: 4.99 K/UL (ref 3.9–12.7)

## 2021-02-02 PROCEDURE — 25500020 PHARM REV CODE 255: Performed by: EMERGENCY MEDICINE

## 2021-02-02 PROCEDURE — 11000001 HC ACUTE MED/SURG PRIVATE ROOM

## 2021-02-02 PROCEDURE — 99285 PR EMERGENCY DEPT VISIT,LEVEL V: ICD-10-PCS | Mod: CS,,, | Performed by: EMERGENCY MEDICINE

## 2021-02-02 PROCEDURE — 85025 COMPLETE CBC W/AUTO DIFF WBC: CPT

## 2021-02-02 PROCEDURE — 99223 PR INITIAL HOSPITAL CARE,LEVL III: ICD-10-PCS | Mod: AI,,, | Performed by: PSYCHIATRY & NEUROLOGY

## 2021-02-02 PROCEDURE — 84484 ASSAY OF TROPONIN QUANT: CPT

## 2021-02-02 PROCEDURE — 93010 EKG 12-LEAD: ICD-10-PCS | Mod: ,,, | Performed by: INTERNAL MEDICINE

## 2021-02-02 PROCEDURE — U0002 COVID-19 LAB TEST NON-CDC: HCPCS | Performed by: EMERGENCY MEDICINE

## 2021-02-02 PROCEDURE — 84443 ASSAY THYROID STIM HORMONE: CPT

## 2021-02-02 PROCEDURE — 63600175 PHARM REV CODE 636 W HCPCS: Performed by: NURSE PRACTITIONER

## 2021-02-02 PROCEDURE — 80061 LIPID PANEL: CPT

## 2021-02-02 PROCEDURE — 93010 ELECTROCARDIOGRAM REPORT: CPT | Mod: ,,, | Performed by: INTERNAL MEDICINE

## 2021-02-02 PROCEDURE — 99285 EMERGENCY DEPT VISIT HI MDM: CPT | Mod: 25

## 2021-02-02 PROCEDURE — 80053 COMPREHEN METABOLIC PANEL: CPT

## 2021-02-02 PROCEDURE — 83036 HEMOGLOBIN GLYCOSYLATED A1C: CPT

## 2021-02-02 PROCEDURE — 93005 ELECTROCARDIOGRAM TRACING: CPT

## 2021-02-02 PROCEDURE — 99223 1ST HOSP IP/OBS HIGH 75: CPT | Mod: AI,,, | Performed by: PSYCHIATRY & NEUROLOGY

## 2021-02-02 PROCEDURE — 85610 PROTHROMBIN TIME: CPT

## 2021-02-02 PROCEDURE — 99285 EMERGENCY DEPT VISIT HI MDM: CPT | Mod: CS,,, | Performed by: EMERGENCY MEDICINE

## 2021-02-02 RX ORDER — ASPIRIN 81 MG/1
81 TABLET ORAL DAILY
Status: DISCONTINUED | OUTPATIENT
Start: 2021-02-03 | End: 2021-02-03 | Stop reason: HOSPADM

## 2021-02-02 RX ORDER — CLOPIDOGREL BISULFATE 75 MG/1
75 TABLET ORAL DAILY
Status: DISCONTINUED | OUTPATIENT
Start: 2021-02-03 | End: 2021-02-03 | Stop reason: HOSPADM

## 2021-02-02 RX ORDER — SODIUM CHLORIDE 0.9 % (FLUSH) 0.9 %
10 SYRINGE (ML) INJECTION
Status: DISCONTINUED | OUTPATIENT
Start: 2021-02-02 | End: 2021-02-03 | Stop reason: HOSPADM

## 2021-02-02 RX ORDER — ATORVASTATIN CALCIUM 20 MG/1
20 TABLET, FILM COATED ORAL DAILY
Status: DISCONTINUED | OUTPATIENT
Start: 2021-02-03 | End: 2021-02-03

## 2021-02-02 RX ORDER — LABETALOL HCL 20 MG/4 ML
10 SYRINGE (ML) INTRAVENOUS
Status: DISCONTINUED | OUTPATIENT
Start: 2021-02-02 | End: 2021-02-03 | Stop reason: HOSPADM

## 2021-02-02 RX ORDER — ACETAMINOPHEN 325 MG/1
650 TABLET ORAL EVERY 6 HOURS PRN
Status: DISCONTINUED | OUTPATIENT
Start: 2021-02-02 | End: 2021-02-03 | Stop reason: HOSPADM

## 2021-02-02 RX ORDER — HEPARIN SODIUM 5000 [USP'U]/ML
5000 INJECTION, SOLUTION INTRAVENOUS; SUBCUTANEOUS EVERY 8 HOURS
Status: DISCONTINUED | OUTPATIENT
Start: 2021-02-02 | End: 2021-02-03 | Stop reason: HOSPADM

## 2021-02-02 RX ADMIN — HEPARIN SODIUM 5000 UNITS: 5000 INJECTION INTRAVENOUS; SUBCUTANEOUS at 10:02

## 2021-02-02 RX ADMIN — IOHEXOL 75 ML: 350 INJECTION, SOLUTION INTRAVENOUS at 10:02

## 2021-02-03 VITALS
RESPIRATION RATE: 17 BRPM | HEART RATE: 94 BPM | HEIGHT: 58 IN | SYSTOLIC BLOOD PRESSURE: 99 MMHG | WEIGHT: 120.38 LBS | OXYGEN SATURATION: 97 % | DIASTOLIC BLOOD PRESSURE: 69 MMHG | TEMPERATURE: 98 F | BODY MASS INDEX: 25.27 KG/M2

## 2021-02-03 PROBLEM — I63.411 EMBOLIC STROKE INVOLVING RIGHT MIDDLE CEREBRAL ARTERY: Status: ACTIVE | Noted: 2021-02-02

## 2021-02-03 LAB
ALBUMIN SERPL BCP-MCNC: 4 G/DL (ref 3.5–5.2)
ALP SERPL-CCNC: 71 U/L (ref 55–135)
ALT SERPL W/O P-5'-P-CCNC: 13 U/L (ref 10–44)
ANION GAP SERPL CALC-SCNC: 11 MMOL/L (ref 8–16)
APTT BLDCRRT: 26.1 SEC (ref 21–32)
ASCENDING AORTA: 3.21 CM
AST SERPL-CCNC: 18 U/L (ref 10–40)
AV INDEX (PROSTH): 0.44
AV MEAN GRADIENT: 9 MMHG
AV PEAK GRADIENT: 16 MMHG
AV VALVE AREA: 1.42 CM2
AV VELOCITY RATIO: 0.54
BASOPHILS # BLD AUTO: 0.04 K/UL (ref 0–0.2)
BASOPHILS NFR BLD: 0.7 % (ref 0–1.9)
BILIRUB SERPL-MCNC: 0.7 MG/DL (ref 0.1–1)
BSA FOR ECHO PROCEDURE: 1.49 M2
BUN SERPL-MCNC: 11 MG/DL (ref 10–30)
CALCIUM SERPL-MCNC: 9.2 MG/DL (ref 8.7–10.5)
CHLORIDE SERPL-SCNC: 103 MMOL/L (ref 95–110)
CK MB SERPL-MCNC: 2.6 NG/ML (ref 0.1–6.5)
CK MB SERPL-RTO: 3.9 % (ref 0–5)
CK SERPL-CCNC: 66 U/L (ref 20–180)
CO2 SERPL-SCNC: 24 MMOL/L (ref 23–29)
CREAT SERPL-MCNC: 0.6 MG/DL (ref 0.5–1.4)
CV ECHO LV RWT: 0.57 CM
DIFFERENTIAL METHOD: ABNORMAL
DOP CALC AO PEAK VEL: 1.99 M/S
DOP CALC AO VTI: 34.23 CM
DOP CALC LVOT AREA: 3.2 CM2
DOP CALC LVOT DIAMETER: 2.03 CM
DOP CALC LVOT PEAK VEL: 1.07 M/S
DOP CALC LVOT STROKE VOLUME: 48.65 CM3
DOP CALCLVOT PEAK VEL VTI: 15.04 CM
E WAVE DECELERATION TIME: 272.41 MSEC
E/A RATIO: 0.63
E/E' RATIO: 21.2 M/S
ECHO LV POSTERIOR WALL: 1.07 CM (ref 0.6–1.1)
EOSINOPHIL # BLD AUTO: 0.1 K/UL (ref 0–0.5)
EOSINOPHIL NFR BLD: 1.4 % (ref 0–8)
ERYTHROCYTE [DISTWIDTH] IN BLOOD BY AUTOMATED COUNT: 14.6 % (ref 11.5–14.5)
EST. GFR  (AFRICAN AMERICAN): >60 ML/MIN/1.73 M^2
EST. GFR  (NON AFRICAN AMERICAN): >60 ML/MIN/1.73 M^2
FRACTIONAL SHORTENING: 31 % (ref 28–44)
GLUCOSE SERPL-MCNC: 107 MG/DL (ref 70–110)
HCT VFR BLD AUTO: 45.3 % (ref 37–48.5)
HGB BLD-MCNC: 14.2 G/DL (ref 12–16)
HR MV ECHO: 88 BPM
IMM GRANULOCYTES # BLD AUTO: 0.02 K/UL (ref 0–0.04)
IMM GRANULOCYTES NFR BLD AUTO: 0.4 % (ref 0–0.5)
INR PPP: 1 (ref 0.8–1.2)
INTERVENTRICULAR SEPTUM: 1.02 CM (ref 0.6–1.1)
LA MAJOR: 4.83 CM
LA MINOR: 6.3 CM
LA WIDTH: 4.26 CM
LEFT ATRIUM SIZE: 3.59 CM
LEFT ATRIUM VOLUME INDEX: 48.4 ML/M2
LEFT ATRIUM VOLUME: 71.08 CM3
LEFT INTERNAL DIMENSION IN SYSTOLE: 2.61 CM (ref 2.1–4)
LEFT VENTRICLE DIASTOLIC VOLUME INDEX: 41.28 ML/M2
LEFT VENTRICLE DIASTOLIC VOLUME: 60.68 ML
LEFT VENTRICLE MASS INDEX: 84 G/M2
LEFT VENTRICLE SYSTOLIC VOLUME INDEX: 16.8 ML/M2
LEFT VENTRICLE SYSTOLIC VOLUME: 24.76 ML
LEFT VENTRICULAR INTERNAL DIMENSION IN DIASTOLE: 3.77 CM (ref 3.5–6)
LEFT VENTRICULAR MASS: 123.44 G
LV LATERAL E/E' RATIO: 53 M/S
LV SEPTAL E/E' RATIO: 13.25 M/S
LYMPHOCYTES # BLD AUTO: 1.2 K/UL (ref 1–4.8)
LYMPHOCYTES NFR BLD: 21.7 % (ref 18–48)
MAGNESIUM SERPL-MCNC: 2.2 MG/DL (ref 1.6–2.6)
MCH RBC QN AUTO: 27.4 PG (ref 27–31)
MCHC RBC AUTO-ENTMCNC: 31.3 G/DL (ref 32–36)
MCV RBC AUTO: 88 FL (ref 82–98)
MONOCYTES # BLD AUTO: 0.7 K/UL (ref 0.3–1)
MONOCYTES NFR BLD: 12.4 % (ref 4–15)
MV MEAN GRADIENT: 4 MMHG
MV PEAK A VEL: 1.67 M/S
MV PEAK E VEL: 1.06 M/S
MV PEAK GRADIENT: 14 MMHG
NEUTROPHILS # BLD AUTO: 3.5 K/UL (ref 1.8–7.7)
NEUTROPHILS NFR BLD: 63.4 % (ref 38–73)
NRBC BLD-RTO: 0 /100 WBC
PHOSPHATE SERPL-MCNC: 3.6 MG/DL (ref 2.7–4.5)
PLATELET # BLD AUTO: 207 K/UL (ref 150–350)
PMV BLD AUTO: 9.6 FL (ref 9.2–12.9)
POTASSIUM SERPL-SCNC: 3.7 MMOL/L (ref 3.5–5.1)
PROT SERPL-MCNC: 8.2 G/DL (ref 6–8.4)
PROTHROMBIN TIME: 10.6 SEC (ref 9–12.5)
RA MAJOR: 4.32 CM
RA PRESSURE: 3 MMHG
RA WIDTH: 2.72 CM
RBC # BLD AUTO: 5.18 M/UL (ref 4–5.4)
RIGHT VENTRICULAR END-DIASTOLIC DIMENSION: 3.22 CM
SINUS: 2.88 CM
SODIUM SERPL-SCNC: 138 MMOL/L (ref 136–145)
STJ: 2.58 CM
TDI LATERAL: 0.02 M/S
TDI SEPTAL: 0.08 M/S
TDI: 0.05 M/S
TRICUSPID ANNULAR PLANE SYSTOLIC EXCURSION: 1.86 CM
TROPONIN I SERPL DL<=0.01 NG/ML-MCNC: 0.01 NG/ML (ref 0–0.03)
WBC # BLD AUTO: 5.57 K/UL (ref 3.9–12.7)

## 2021-02-03 PROCEDURE — 85025 COMPLETE CBC W/AUTO DIFF WBC: CPT

## 2021-02-03 PROCEDURE — 80053 COMPREHEN METABOLIC PANEL: CPT

## 2021-02-03 PROCEDURE — 99900035 HC TECH TIME PER 15 MIN (STAT)

## 2021-02-03 PROCEDURE — 94660 CPAP INITIATION&MGMT: CPT

## 2021-02-03 PROCEDURE — 99222 PR INITIAL HOSPITAL CARE,LEVL II: ICD-10-PCS | Mod: ,,, | Performed by: NURSE PRACTITIONER

## 2021-02-03 PROCEDURE — 85610 PROTHROMBIN TIME: CPT

## 2021-02-03 PROCEDURE — 83735 ASSAY OF MAGNESIUM: CPT

## 2021-02-03 PROCEDURE — 84100 ASSAY OF PHOSPHORUS: CPT

## 2021-02-03 PROCEDURE — 99233 SBSQ HOSP IP/OBS HIGH 50: CPT | Mod: ,,, | Performed by: PSYCHIATRY & NEUROLOGY

## 2021-02-03 PROCEDURE — 99222 1ST HOSP IP/OBS MODERATE 55: CPT | Mod: ,,, | Performed by: NURSE PRACTITIONER

## 2021-02-03 PROCEDURE — 97161 PT EVAL LOW COMPLEX 20 MIN: CPT

## 2021-02-03 PROCEDURE — 99233 PR SUBSEQUENT HOSPITAL CARE,LEVL III: ICD-10-PCS | Mod: ,,, | Performed by: PSYCHIATRY & NEUROLOGY

## 2021-02-03 PROCEDURE — 25000003 PHARM REV CODE 250: Performed by: NURSE PRACTITIONER

## 2021-02-03 PROCEDURE — 82550 ASSAY OF CK (CPK): CPT

## 2021-02-03 PROCEDURE — 27000190 HC CPAP FULL FACE MASK W/VALVE

## 2021-02-03 PROCEDURE — 63600175 PHARM REV CODE 636 W HCPCS: Performed by: NURSE PRACTITIONER

## 2021-02-03 PROCEDURE — 82553 CREATINE MB FRACTION: CPT

## 2021-02-03 PROCEDURE — 97535 SELF CARE MNGMENT TRAINING: CPT

## 2021-02-03 PROCEDURE — 97530 THERAPEUTIC ACTIVITIES: CPT

## 2021-02-03 PROCEDURE — 92523 SPEECH SOUND LANG COMPREHEN: CPT

## 2021-02-03 PROCEDURE — 97165 OT EVAL LOW COMPLEX 30 MIN: CPT

## 2021-02-03 PROCEDURE — 85730 THROMBOPLASTIN TIME PARTIAL: CPT

## 2021-02-03 PROCEDURE — 92610 EVALUATE SWALLOWING FUNCTION: CPT

## 2021-02-03 PROCEDURE — 36415 COLL VENOUS BLD VENIPUNCTURE: CPT

## 2021-02-03 PROCEDURE — 84484 ASSAY OF TROPONIN QUANT: CPT

## 2021-02-03 RX ORDER — ASPIRIN 81 MG/1
81 TABLET ORAL DAILY
Qty: 30 TABLET | Refills: 0 | Status: SHIPPED | OUTPATIENT
Start: 2021-02-04 | End: 2021-12-14 | Stop reason: ALTCHOICE

## 2021-02-03 RX ORDER — CLOPIDOGREL BISULFATE 75 MG/1
75 TABLET ORAL DAILY
Qty: 21 TABLET | Refills: 0 | Status: SHIPPED | OUTPATIENT
Start: 2021-02-04 | End: 2021-09-29 | Stop reason: SDUPTHER

## 2021-02-03 RX ORDER — ATORVASTATIN CALCIUM 40 MG/1
40 TABLET, FILM COATED ORAL DAILY
Qty: 30 TABLET | Refills: 0 | Status: SHIPPED | OUTPATIENT
Start: 2021-02-04 | End: 2021-02-25 | Stop reason: SDUPTHER

## 2021-02-03 RX ORDER — ATORVASTATIN CALCIUM 20 MG/1
40 TABLET, FILM COATED ORAL DAILY
Status: DISCONTINUED | OUTPATIENT
Start: 2021-02-04 | End: 2021-02-03 | Stop reason: HOSPADM

## 2021-02-03 RX ADMIN — CLOPIDOGREL 75 MG: 75 TABLET, FILM COATED ORAL at 11:02

## 2021-02-03 RX ADMIN — ACETAMINOPHEN 650 MG: 325 TABLET ORAL at 03:02

## 2021-02-03 RX ADMIN — ASPIRIN 81 MG: 81 TABLET, COATED ORAL at 11:02

## 2021-02-03 RX ADMIN — ATORVASTATIN CALCIUM 20 MG: 20 TABLET, FILM COATED ORAL at 11:02

## 2021-02-03 RX ADMIN — HEPARIN SODIUM 5000 UNITS: 5000 INJECTION INTRAVENOUS; SUBCUTANEOUS at 05:02

## 2021-02-04 ENCOUNTER — TELEPHONE (OUTPATIENT)
Dept: NEUROLOGY | Facility: CLINIC | Age: 86
End: 2021-02-04

## 2021-02-09 PROCEDURE — G0180 MD CERTIFICATION HHA PATIENT: HCPCS | Mod: ,,, | Performed by: PSYCHIATRY & NEUROLOGY

## 2021-02-09 PROCEDURE — G0180 PR HOME HEALTH MD CERTIFICATION: ICD-10-PCS | Mod: ,,, | Performed by: PSYCHIATRY & NEUROLOGY

## 2021-02-12 ENCOUNTER — OFFICE VISIT (OUTPATIENT)
Dept: INTERNAL MEDICINE | Facility: CLINIC | Age: 86
End: 2021-02-12
Payer: MEDICARE

## 2021-02-12 VITALS
TEMPERATURE: 98 F | SYSTOLIC BLOOD PRESSURE: 164 MMHG | WEIGHT: 133.19 LBS | RESPIRATION RATE: 12 BRPM | BODY MASS INDEX: 27.96 KG/M2 | DIASTOLIC BLOOD PRESSURE: 64 MMHG | HEIGHT: 58 IN | OXYGEN SATURATION: 96 % | HEART RATE: 88 BPM

## 2021-02-12 DIAGNOSIS — R41.0 CONFUSION: ICD-10-CM

## 2021-02-12 DIAGNOSIS — E78.2 MIXED HYPERLIPIDEMIA: ICD-10-CM

## 2021-02-12 DIAGNOSIS — I63.411 EMBOLIC STROKE INVOLVING RIGHT MIDDLE CEREBRAL ARTERY: Primary | ICD-10-CM

## 2021-02-12 LAB
BACTERIA #/AREA URNS AUTO: NORMAL /HPF
BILIRUB SERPL-MCNC: ABNORMAL MG/DL
BILIRUB UR QL STRIP: NEGATIVE
BLOOD URINE, POC: ABNORMAL
CLARITY UR REFRACT.AUTO: CLEAR
CLARITY, POC UA: ABNORMAL
COLOR UR AUTO: YELLOW
COLOR, POC UA: ABNORMAL
GLUCOSE UR QL STRIP: NEGATIVE
GLUCOSE UR QL STRIP: NORMAL
HGB UR QL STRIP: ABNORMAL
KETONES UR QL STRIP: ABNORMAL
KETONES UR QL STRIP: NEGATIVE
LEUKOCYTE ESTERASE UR QL STRIP: ABNORMAL
LEUKOCYTE ESTERASE URINE, POC: ABNORMAL
MICROSCOPIC COMMENT: NORMAL
NITRITE UR QL STRIP: NEGATIVE
NITRITE, POC UA: ABNORMAL
PH UR STRIP: 5 [PH] (ref 5–8)
PH, POC UA: 5
PROT UR QL STRIP: NEGATIVE
PROTEIN, POC: ABNORMAL
RBC #/AREA URNS AUTO: 4 /HPF (ref 0–4)
SP GR UR STRIP: 1.02 (ref 1–1.03)
SPECIFIC GRAVITY, POC UA: 1.02
SQUAMOUS #/AREA URNS AUTO: 0 /HPF
URN SPEC COLLECT METH UR: ABNORMAL
UROBILINOGEN, POC UA: NORMAL
WBC #/AREA URNS AUTO: 3 /HPF (ref 0–5)

## 2021-02-12 PROCEDURE — 1100F PR PT FALLS ASSESS DOC 2+ FALLS/FALL W/INJURY/YR: ICD-10-PCS | Mod: CPTII,S$GLB,, | Performed by: NURSE PRACTITIONER

## 2021-02-12 PROCEDURE — 1126F AMNT PAIN NOTED NONE PRSNT: CPT | Mod: S$GLB,,, | Performed by: NURSE PRACTITIONER

## 2021-02-12 PROCEDURE — 3288F PR FALLS RISK ASSESSMENT DOCUMENTED: ICD-10-PCS | Mod: CPTII,S$GLB,, | Performed by: NURSE PRACTITIONER

## 2021-02-12 PROCEDURE — 1126F PR PAIN SEVERITY QUANTIFIED, NO PAIN PRESENT: ICD-10-PCS | Mod: S$GLB,,, | Performed by: NURSE PRACTITIONER

## 2021-02-12 PROCEDURE — 99214 OFFICE O/P EST MOD 30 MIN: CPT | Mod: 25,S$GLB,, | Performed by: NURSE PRACTITIONER

## 2021-02-12 PROCEDURE — 99999 PR PBB SHADOW E&M-EST. PATIENT-LVL IV: CPT | Mod: PBBFAC,,, | Performed by: NURSE PRACTITIONER

## 2021-02-12 PROCEDURE — 81002 URINALYSIS NONAUTO W/O SCOPE: CPT | Mod: S$GLB,,, | Performed by: NURSE PRACTITIONER

## 2021-02-12 PROCEDURE — 1100F PTFALLS ASSESS-DOCD GE2>/YR: CPT | Mod: CPTII,S$GLB,, | Performed by: NURSE PRACTITIONER

## 2021-02-12 PROCEDURE — 87086 URINE CULTURE/COLONY COUNT: CPT

## 2021-02-12 PROCEDURE — 81001 URINALYSIS AUTO W/SCOPE: CPT

## 2021-02-12 PROCEDURE — 99214 PR OFFICE/OUTPT VISIT, EST, LEVL IV, 30-39 MIN: ICD-10-PCS | Mod: 25,S$GLB,, | Performed by: NURSE PRACTITIONER

## 2021-02-12 PROCEDURE — 81002 POCT URINE DIPSTICK WITHOUT MICROSCOPE: ICD-10-PCS | Mod: S$GLB,,, | Performed by: NURSE PRACTITIONER

## 2021-02-12 PROCEDURE — 99999 PR PBB SHADOW E&M-EST. PATIENT-LVL IV: ICD-10-PCS | Mod: PBBFAC,,, | Performed by: NURSE PRACTITIONER

## 2021-02-12 PROCEDURE — 3288F FALL RISK ASSESSMENT DOCD: CPT | Mod: CPTII,S$GLB,, | Performed by: NURSE PRACTITIONER

## 2021-02-12 RX ORDER — ERGOCALCIFEROL (VITAMIN D2) 50 MCG
CAPSULE ORAL
COMMUNITY
End: 2021-02-12 | Stop reason: SDUPTHER

## 2021-02-14 LAB — BACTERIA UR CULT: NORMAL

## 2021-02-15 ENCOUNTER — PATIENT MESSAGE (OUTPATIENT)
Dept: INTERNAL MEDICINE | Facility: CLINIC | Age: 86
End: 2021-02-15

## 2021-02-17 ENCOUNTER — EXTERNAL HOME HEALTH (OUTPATIENT)
Dept: HOME HEALTH SERVICES | Facility: HOSPITAL | Age: 86
End: 2021-02-17
Payer: MEDICARE

## 2021-02-17 ENCOUNTER — DOCUMENT SCAN (OUTPATIENT)
Dept: HOME HEALTH SERVICES | Facility: HOSPITAL | Age: 86
End: 2021-02-17
Payer: MEDICARE

## 2021-02-18 DIAGNOSIS — R05.9 COUGH: ICD-10-CM

## 2021-02-18 DIAGNOSIS — R06.7 SNEEZING: ICD-10-CM

## 2021-02-18 DIAGNOSIS — R09.81 SINUS CONGESTION: ICD-10-CM

## 2021-02-18 DIAGNOSIS — R09.89 RUNNY NOSE: ICD-10-CM

## 2021-02-18 RX ORDER — MONTELUKAST SODIUM 10 MG/1
10 TABLET ORAL NIGHTLY
Qty: 90 TABLET | Refills: 3 | Status: SHIPPED | OUTPATIENT
Start: 2021-02-18 | End: 2021-02-24 | Stop reason: SDUPTHER

## 2021-02-22 ENCOUNTER — DOCUMENT SCAN (OUTPATIENT)
Dept: HOME HEALTH SERVICES | Facility: HOSPITAL | Age: 86
End: 2021-02-22
Payer: MEDICARE

## 2021-02-23 ENCOUNTER — DOCUMENT SCAN (OUTPATIENT)
Dept: HOME HEALTH SERVICES | Facility: HOSPITAL | Age: 86
End: 2021-02-23
Payer: MEDICARE

## 2021-02-24 ENCOUNTER — TELEPHONE (OUTPATIENT)
Dept: HEMATOLOGY/ONCOLOGY | Facility: CLINIC | Age: 86
End: 2021-02-24

## 2021-02-24 ENCOUNTER — DOCUMENT SCAN (OUTPATIENT)
Dept: HOME HEALTH SERVICES | Facility: HOSPITAL | Age: 86
End: 2021-02-24
Payer: MEDICARE

## 2021-02-24 DIAGNOSIS — R09.89 RUNNY NOSE: ICD-10-CM

## 2021-02-24 DIAGNOSIS — R09.81 SINUS CONGESTION: ICD-10-CM

## 2021-02-24 DIAGNOSIS — J98.01 COUGH DUE TO BRONCHOSPASM: ICD-10-CM

## 2021-02-24 DIAGNOSIS — R05.9 COUGH: ICD-10-CM

## 2021-02-24 DIAGNOSIS — R06.7 SNEEZING: ICD-10-CM

## 2021-02-24 RX ORDER — MONTELUKAST SODIUM 10 MG/1
10 TABLET ORAL NIGHTLY
Qty: 90 TABLET | Refills: 3 | Status: SHIPPED | OUTPATIENT
Start: 2021-02-24 | End: 2022-01-05

## 2021-02-24 RX ORDER — BENZONATATE 200 MG/1
200 CAPSULE ORAL 3 TIMES DAILY PRN
Qty: 30 CAPSULE | Refills: 1 | Status: SHIPPED | OUTPATIENT
Start: 2021-02-24 | End: 2022-04-12 | Stop reason: SDUPTHER

## 2021-03-01 ENCOUNTER — TELEPHONE (OUTPATIENT)
Dept: NEUROLOGY | Facility: HOSPITAL | Age: 86
End: 2021-03-01

## 2021-03-02 ENCOUNTER — TELEPHONE (OUTPATIENT)
Dept: NEUROLOGY | Facility: CLINIC | Age: 86
End: 2021-03-02

## 2021-03-02 RX ORDER — ATORVASTATIN CALCIUM 40 MG/1
40 TABLET, FILM COATED ORAL DAILY
Qty: 30 TABLET | Refills: 0 | Status: SHIPPED | OUTPATIENT
Start: 2021-03-02 | End: 2021-12-14 | Stop reason: SDUPTHER

## 2021-03-03 ENCOUNTER — DOCUMENT SCAN (OUTPATIENT)
Dept: HOME HEALTH SERVICES | Facility: HOSPITAL | Age: 86
End: 2021-03-03
Payer: MEDICARE

## 2021-03-09 ENCOUNTER — TELEPHONE (OUTPATIENT)
Dept: INTERNAL MEDICINE | Facility: CLINIC | Age: 86
End: 2021-03-09

## 2021-03-10 ENCOUNTER — DOCUMENT SCAN (OUTPATIENT)
Dept: HOME HEALTH SERVICES | Facility: HOSPITAL | Age: 86
End: 2021-03-10
Payer: MEDICARE

## 2021-03-15 ENCOUNTER — OFFICE VISIT (OUTPATIENT)
Dept: NEUROLOGY | Facility: CLINIC | Age: 86
End: 2021-03-15
Payer: MEDICARE

## 2021-03-15 VITALS
DIASTOLIC BLOOD PRESSURE: 81 MMHG | SYSTOLIC BLOOD PRESSURE: 121 MMHG | HEIGHT: 58 IN | BODY MASS INDEX: 27.83 KG/M2 | HEART RATE: 99 BPM

## 2021-03-15 DIAGNOSIS — I63.411 EMBOLIC STROKE INVOLVING RIGHT MIDDLE CEREBRAL ARTERY: ICD-10-CM

## 2021-03-15 DIAGNOSIS — I67.848 OTHER CEREBROVASCULAR VASOSPASM AND VASOCONSTRICTION: ICD-10-CM

## 2021-03-15 PROCEDURE — 99499 RISK ADDL DX/OHS AUDIT: ICD-10-PCS | Mod: S$GLB,,, | Performed by: PSYCHIATRY & NEUROLOGY

## 2021-03-15 PROCEDURE — 1101F PT FALLS ASSESS-DOCD LE1/YR: CPT | Mod: CPTII,S$GLB,, | Performed by: PSYCHIATRY & NEUROLOGY

## 2021-03-15 PROCEDURE — 1101F PR PT FALLS ASSESS DOC 0-1 FALLS W/OUT INJ PAST YR: ICD-10-PCS | Mod: CPTII,S$GLB,, | Performed by: PSYCHIATRY & NEUROLOGY

## 2021-03-15 PROCEDURE — 99214 PR OFFICE/OUTPT VISIT, EST, LEVL IV, 30-39 MIN: ICD-10-PCS | Mod: S$GLB,,, | Performed by: PSYCHIATRY & NEUROLOGY

## 2021-03-15 PROCEDURE — 1159F MED LIST DOCD IN RCRD: CPT | Mod: S$GLB,,, | Performed by: PSYCHIATRY & NEUROLOGY

## 2021-03-15 PROCEDURE — 99999 PR PBB SHADOW E&M-EST. PATIENT-LVL III: ICD-10-PCS | Mod: PBBFAC,,, | Performed by: PSYCHIATRY & NEUROLOGY

## 2021-03-15 PROCEDURE — 99214 OFFICE O/P EST MOD 30 MIN: CPT | Mod: S$GLB,,, | Performed by: PSYCHIATRY & NEUROLOGY

## 2021-03-15 PROCEDURE — 1126F PR PAIN SEVERITY QUANTIFIED, NO PAIN PRESENT: ICD-10-PCS | Mod: S$GLB,,, | Performed by: PSYCHIATRY & NEUROLOGY

## 2021-03-15 PROCEDURE — 1159F PR MEDICATION LIST DOCUMENTED IN MEDICAL RECORD: ICD-10-PCS | Mod: S$GLB,,, | Performed by: PSYCHIATRY & NEUROLOGY

## 2021-03-15 PROCEDURE — 99999 PR PBB SHADOW E&M-EST. PATIENT-LVL III: CPT | Mod: PBBFAC,,, | Performed by: PSYCHIATRY & NEUROLOGY

## 2021-03-15 PROCEDURE — 3288F PR FALLS RISK ASSESSMENT DOCUMENTED: ICD-10-PCS | Mod: CPTII,S$GLB,, | Performed by: PSYCHIATRY & NEUROLOGY

## 2021-03-15 PROCEDURE — 99499 UNLISTED E&M SERVICE: CPT | Mod: S$GLB,,, | Performed by: PSYCHIATRY & NEUROLOGY

## 2021-03-15 PROCEDURE — 1126F AMNT PAIN NOTED NONE PRSNT: CPT | Mod: S$GLB,,, | Performed by: PSYCHIATRY & NEUROLOGY

## 2021-03-15 PROCEDURE — 3288F FALL RISK ASSESSMENT DOCD: CPT | Mod: CPTII,S$GLB,, | Performed by: PSYCHIATRY & NEUROLOGY

## 2021-03-18 ENCOUNTER — TELEPHONE (OUTPATIENT)
Dept: CARDIOLOGY | Facility: HOSPITAL | Age: 86
End: 2021-03-18

## 2021-03-19 ENCOUNTER — CLINICAL SUPPORT (OUTPATIENT)
Dept: CARDIOLOGY | Facility: HOSPITAL | Age: 86
End: 2021-03-19
Attending: PSYCHIATRY & NEUROLOGY
Payer: MEDICARE

## 2021-03-19 DIAGNOSIS — I63.411 EMBOLIC STROKE INVOLVING RIGHT MIDDLE CEREBRAL ARTERY: ICD-10-CM

## 2021-03-19 DIAGNOSIS — I67.848 OTHER CEREBROVASCULAR VASOSPASM AND VASOCONSTRICTION: ICD-10-CM

## 2021-03-19 PROCEDURE — 93270 REMOTE 30 DAY ECG REV/REPORT: CPT

## 2021-03-19 PROCEDURE — 93272 CARDIAC EVENT MONITOR (CUPID ONLY): ICD-10-PCS | Mod: ,,, | Performed by: INTERNAL MEDICINE

## 2021-03-19 PROCEDURE — 93272 ECG/REVIEW INTERPRET ONLY: CPT | Mod: ,,, | Performed by: INTERNAL MEDICINE

## 2021-03-23 ENCOUNTER — TELEPHONE (OUTPATIENT)
Dept: NEUROLOGY | Facility: CLINIC | Age: 86
End: 2021-03-23

## 2021-04-06 ENCOUNTER — TELEPHONE (OUTPATIENT)
Dept: INTERNAL MEDICINE | Facility: CLINIC | Age: 86
End: 2021-04-06

## 2021-04-08 ENCOUNTER — TELEPHONE (OUTPATIENT)
Dept: INTERNAL MEDICINE | Facility: CLINIC | Age: 86
End: 2021-04-08

## 2021-04-13 ENCOUNTER — TELEPHONE (OUTPATIENT)
Dept: NEUROLOGY | Facility: CLINIC | Age: 86
End: 2021-04-13

## 2021-04-13 ENCOUNTER — TELEPHONE (OUTPATIENT)
Dept: CARDIOLOGY | Facility: HOSPITAL | Age: 86
End: 2021-04-13

## 2021-04-13 DIAGNOSIS — I48.0 PAROXYSMAL ATRIAL FIBRILLATION: ICD-10-CM

## 2021-04-13 DIAGNOSIS — I63.412 CEREBROVASCULAR ACCIDENT (CVA) DUE TO EMBOLISM OF LEFT MIDDLE CEREBRAL ARTERY: Primary | ICD-10-CM

## 2021-04-14 ENCOUNTER — TELEPHONE (OUTPATIENT)
Dept: NEUROLOGY | Facility: HOSPITAL | Age: 86
End: 2021-04-14

## 2021-04-14 ENCOUNTER — LAB VISIT (OUTPATIENT)
Dept: LAB | Facility: HOSPITAL | Age: 86
End: 2021-04-14
Attending: PSYCHIATRY & NEUROLOGY
Payer: MEDICARE

## 2021-04-14 DIAGNOSIS — I63.412 CEREBROVASCULAR ACCIDENT (CVA) DUE TO EMBOLISM OF LEFT MIDDLE CEREBRAL ARTERY: ICD-10-CM

## 2021-04-14 LAB
CREAT SERPL-MCNC: 0.6 MG/DL (ref 0.5–1.4)
EST. GFR  (AFRICAN AMERICAN): >60 ML/MIN/1.73 M^2
EST. GFR  (NON AFRICAN AMERICAN): >60 ML/MIN/1.73 M^2

## 2021-04-14 PROCEDURE — 82565 ASSAY OF CREATININE: CPT | Performed by: PSYCHIATRY & NEUROLOGY

## 2021-04-14 PROCEDURE — 36415 COLL VENOUS BLD VENIPUNCTURE: CPT | Performed by: PSYCHIATRY & NEUROLOGY

## 2021-04-15 ENCOUNTER — TELEPHONE (OUTPATIENT)
Dept: NEUROLOGY | Facility: CLINIC | Age: 86
End: 2021-04-15

## 2021-04-16 ENCOUNTER — TELEPHONE (OUTPATIENT)
Dept: NEUROLOGY | Facility: CLINIC | Age: 86
End: 2021-04-16

## 2021-05-13 ENCOUNTER — TELEPHONE (OUTPATIENT)
Dept: NEUROLOGY | Facility: CLINIC | Age: 86
End: 2021-05-13

## 2021-05-17 ENCOUNTER — TELEPHONE (OUTPATIENT)
Dept: NEUROLOGY | Facility: CLINIC | Age: 86
End: 2021-05-17

## 2021-05-17 DIAGNOSIS — I48.91 ATRIAL FIBRILLATION, NEW ONSET: Primary | ICD-10-CM

## 2021-05-24 ENCOUNTER — TELEPHONE (OUTPATIENT)
Dept: SURGERY | Facility: CLINIC | Age: 86
End: 2021-05-24

## 2021-06-14 ENCOUNTER — OFFICE VISIT (OUTPATIENT)
Dept: ELECTROPHYSIOLOGY | Facility: CLINIC | Age: 86
End: 2021-06-14
Payer: MEDICARE

## 2021-06-14 ENCOUNTER — HOSPITAL ENCOUNTER (OUTPATIENT)
Dept: CARDIOLOGY | Facility: CLINIC | Age: 86
Discharge: HOME | End: 2021-06-14
Payer: MEDICARE

## 2021-06-14 VITALS
BODY MASS INDEX: 28.69 KG/M2 | HEART RATE: 86 BPM | WEIGHT: 136.69 LBS | HEIGHT: 58 IN | DIASTOLIC BLOOD PRESSURE: 76 MMHG | SYSTOLIC BLOOD PRESSURE: 144 MMHG | OXYGEN SATURATION: 97 %

## 2021-06-14 DIAGNOSIS — I63.411 EMBOLIC STROKE INVOLVING RIGHT MIDDLE CEREBRAL ARTERY: Primary | ICD-10-CM

## 2021-06-14 DIAGNOSIS — I63.411 CEREBROVASCULAR ACCIDENT (CVA) DUE TO EMBOLISM OF RIGHT MIDDLE CEREBRAL ARTERY: ICD-10-CM

## 2021-06-14 DIAGNOSIS — K75.81 NASH (NONALCOHOLIC STEATOHEPATITIS): ICD-10-CM

## 2021-06-14 DIAGNOSIS — I48.0 PAROXYSMAL ATRIAL FIBRILLATION: Primary | ICD-10-CM

## 2021-06-14 DIAGNOSIS — I63.411 EMBOLIC STROKE INVOLVING RIGHT MIDDLE CEREBRAL ARTERY: ICD-10-CM

## 2021-06-14 DIAGNOSIS — G47.33 OSA (OBSTRUCTIVE SLEEP APNEA): ICD-10-CM

## 2021-06-14 DIAGNOSIS — Z85.3 HISTORY OF BILATERAL BREAST CANCER: ICD-10-CM

## 2021-06-14 DIAGNOSIS — M15.9 PRIMARY OSTEOARTHRITIS INVOLVING MULTIPLE JOINTS: ICD-10-CM

## 2021-06-14 DIAGNOSIS — C34.90 ADENOCARCINOMA OF LUNG, UNSPECIFIED LATERALITY: ICD-10-CM

## 2021-06-14 DIAGNOSIS — E78.2 MIXED HYPERLIPIDEMIA: ICD-10-CM

## 2021-06-14 DIAGNOSIS — C44.81 BASAL CELL CARCINOMA (BCC) OF OVERLAPPING SITES OF SKIN: ICD-10-CM

## 2021-06-14 PROCEDURE — 99499 RISK ADDL DX/OHS AUDIT: ICD-10-PCS | Mod: HCNC,S$GLB,, | Performed by: STUDENT IN AN ORGANIZED HEALTH CARE EDUCATION/TRAINING PROGRAM

## 2021-06-14 PROCEDURE — 93005 RHYTHM STRIP: ICD-10-PCS | Mod: S$GLB,,, | Performed by: STUDENT IN AN ORGANIZED HEALTH CARE EDUCATION/TRAINING PROGRAM

## 2021-06-14 PROCEDURE — 1101F PT FALLS ASSESS-DOCD LE1/YR: CPT | Mod: CPTII,S$GLB,, | Performed by: STUDENT IN AN ORGANIZED HEALTH CARE EDUCATION/TRAINING PROGRAM

## 2021-06-14 PROCEDURE — 3288F PR FALLS RISK ASSESSMENT DOCUMENTED: ICD-10-PCS | Mod: CPTII,S$GLB,, | Performed by: STUDENT IN AN ORGANIZED HEALTH CARE EDUCATION/TRAINING PROGRAM

## 2021-06-14 PROCEDURE — 1126F AMNT PAIN NOTED NONE PRSNT: CPT | Mod: S$GLB,,, | Performed by: STUDENT IN AN ORGANIZED HEALTH CARE EDUCATION/TRAINING PROGRAM

## 2021-06-14 PROCEDURE — 99999 PR PBB SHADOW E&M-EST. PATIENT-LVL III: CPT | Mod: PBBFAC,,, | Performed by: STUDENT IN AN ORGANIZED HEALTH CARE EDUCATION/TRAINING PROGRAM

## 2021-06-14 PROCEDURE — 99499 UNLISTED E&M SERVICE: CPT | Mod: HCNC,S$GLB,, | Performed by: STUDENT IN AN ORGANIZED HEALTH CARE EDUCATION/TRAINING PROGRAM

## 2021-06-14 PROCEDURE — 1101F PR PT FALLS ASSESS DOC 0-1 FALLS W/OUT INJ PAST YR: ICD-10-PCS | Mod: CPTII,S$GLB,, | Performed by: STUDENT IN AN ORGANIZED HEALTH CARE EDUCATION/TRAINING PROGRAM

## 2021-06-14 PROCEDURE — 1159F PR MEDICATION LIST DOCUMENTED IN MEDICAL RECORD: ICD-10-PCS | Mod: S$GLB,,, | Performed by: STUDENT IN AN ORGANIZED HEALTH CARE EDUCATION/TRAINING PROGRAM

## 2021-06-14 PROCEDURE — 93005 ELECTROCARDIOGRAM TRACING: CPT | Mod: S$GLB,,, | Performed by: STUDENT IN AN ORGANIZED HEALTH CARE EDUCATION/TRAINING PROGRAM

## 2021-06-14 PROCEDURE — 1126F PR PAIN SEVERITY QUANTIFIED, NO PAIN PRESENT: ICD-10-PCS | Mod: S$GLB,,, | Performed by: STUDENT IN AN ORGANIZED HEALTH CARE EDUCATION/TRAINING PROGRAM

## 2021-06-14 PROCEDURE — 99205 PR OFFICE/OUTPT VISIT, NEW, LEVL V, 60-74 MIN: ICD-10-PCS | Mod: S$GLB,,, | Performed by: STUDENT IN AN ORGANIZED HEALTH CARE EDUCATION/TRAINING PROGRAM

## 2021-06-14 PROCEDURE — 99205 OFFICE O/P NEW HI 60 MIN: CPT | Mod: S$GLB,,, | Performed by: STUDENT IN AN ORGANIZED HEALTH CARE EDUCATION/TRAINING PROGRAM

## 2021-06-14 PROCEDURE — 1159F MED LIST DOCD IN RCRD: CPT | Mod: S$GLB,,, | Performed by: STUDENT IN AN ORGANIZED HEALTH CARE EDUCATION/TRAINING PROGRAM

## 2021-06-14 PROCEDURE — 93010 RHYTHM STRIP: ICD-10-PCS | Mod: S$GLB,,, | Performed by: INTERNAL MEDICINE

## 2021-06-14 PROCEDURE — 3288F FALL RISK ASSESSMENT DOCD: CPT | Mod: CPTII,S$GLB,, | Performed by: STUDENT IN AN ORGANIZED HEALTH CARE EDUCATION/TRAINING PROGRAM

## 2021-06-14 PROCEDURE — 99999 PR PBB SHADOW E&M-EST. PATIENT-LVL III: ICD-10-PCS | Mod: PBBFAC,,, | Performed by: STUDENT IN AN ORGANIZED HEALTH CARE EDUCATION/TRAINING PROGRAM

## 2021-06-14 PROCEDURE — 93010 ELECTROCARDIOGRAM REPORT: CPT | Mod: S$GLB,,, | Performed by: INTERNAL MEDICINE

## 2021-06-14 RX ORDER — METOPROLOL SUCCINATE 25 MG/1
25 TABLET, EXTENDED RELEASE ORAL DAILY
Qty: 30 TABLET | Refills: 11 | Status: SHIPPED | OUTPATIENT
Start: 2021-06-14 | End: 2021-08-19 | Stop reason: SDUPTHER

## 2021-06-14 RX ORDER — METOPROLOL SUCCINATE 25 MG/1
25 TABLET, EXTENDED RELEASE ORAL DAILY
Qty: 30 TABLET | Refills: 11 | Status: SHIPPED | OUTPATIENT
Start: 2021-06-14 | End: 2021-06-14

## 2021-06-15 PROBLEM — I48.0 PAROXYSMAL ATRIAL FIBRILLATION: Status: ACTIVE | Noted: 2021-06-15

## 2021-07-28 ENCOUNTER — TELEPHONE (OUTPATIENT)
Dept: ELECTROPHYSIOLOGY | Facility: CLINIC | Age: 86
End: 2021-07-28

## 2021-07-28 ENCOUNTER — TELEPHONE (OUTPATIENT)
Dept: HEMATOLOGY/ONCOLOGY | Facility: CLINIC | Age: 86
End: 2021-07-28
Payer: MEDICARE

## 2021-08-18 ENCOUNTER — OFFICE VISIT (OUTPATIENT)
Dept: URGENT CARE | Facility: CLINIC | Age: 86
End: 2021-08-18
Payer: MEDICARE

## 2021-08-18 VITALS
HEIGHT: 58 IN | RESPIRATION RATE: 14 BRPM | HEART RATE: 79 BPM | BODY MASS INDEX: 29.39 KG/M2 | SYSTOLIC BLOOD PRESSURE: 108 MMHG | OXYGEN SATURATION: 95 % | TEMPERATURE: 98 F | DIASTOLIC BLOOD PRESSURE: 68 MMHG | WEIGHT: 140 LBS

## 2021-08-18 DIAGNOSIS — J01.40 ACUTE PANSINUSITIS, RECURRENCE NOT SPECIFIED: ICD-10-CM

## 2021-08-18 DIAGNOSIS — J98.01 ACUTE BRONCHOSPASM: ICD-10-CM

## 2021-08-18 DIAGNOSIS — R05.9 COUGH: Primary | ICD-10-CM

## 2021-08-18 DIAGNOSIS — J20.9 ACUTE PURULENT BRONCHITIS: ICD-10-CM

## 2021-08-18 LAB
CTP QC/QA: YES
SARS-COV-2 RDRP RESP QL NAA+PROBE: NEGATIVE

## 2021-08-18 PROCEDURE — U0002: ICD-10-PCS | Mod: QW,S$GLB,, | Performed by: INTERNAL MEDICINE

## 2021-08-18 PROCEDURE — 1159F PR MEDICATION LIST DOCUMENTED IN MEDICAL RECORD: ICD-10-PCS | Mod: CPTII,S$GLB,, | Performed by: INTERNAL MEDICINE

## 2021-08-18 PROCEDURE — 99214 PR OFFICE/OUTPT VISIT, EST, LEVL IV, 30-39 MIN: ICD-10-PCS | Mod: 25,S$GLB,CS, | Performed by: INTERNAL MEDICINE

## 2021-08-18 PROCEDURE — 96372 PR INJECTION,THERAP/PROPH/DIAG2ST, IM OR SUBCUT: ICD-10-PCS | Mod: S$GLB,,, | Performed by: INTERNAL MEDICINE

## 2021-08-18 PROCEDURE — U0002 COVID-19 LAB TEST NON-CDC: HCPCS | Mod: QW,S$GLB,, | Performed by: INTERNAL MEDICINE

## 2021-08-18 PROCEDURE — 1160F RVW MEDS BY RX/DR IN RCRD: CPT | Mod: CPTII,S$GLB,, | Performed by: INTERNAL MEDICINE

## 2021-08-18 PROCEDURE — 1160F PR REVIEW ALL MEDS BY PRESCRIBER/CLIN PHARMACIST DOCUMENTED: ICD-10-PCS | Mod: CPTII,S$GLB,, | Performed by: INTERNAL MEDICINE

## 2021-08-18 PROCEDURE — 1159F MED LIST DOCD IN RCRD: CPT | Mod: CPTII,S$GLB,, | Performed by: INTERNAL MEDICINE

## 2021-08-18 PROCEDURE — 96372 THER/PROPH/DIAG INJ SC/IM: CPT | Mod: S$GLB,,, | Performed by: INTERNAL MEDICINE

## 2021-08-18 PROCEDURE — 99214 OFFICE O/P EST MOD 30 MIN: CPT | Mod: 25,S$GLB,CS, | Performed by: INTERNAL MEDICINE

## 2021-08-18 RX ORDER — DEXAMETHASONE SODIUM PHOSPHATE 100 MG/10ML
10 INJECTION INTRAMUSCULAR; INTRAVENOUS
Status: COMPLETED | OUTPATIENT
Start: 2021-08-18 | End: 2021-08-18

## 2021-08-18 RX ADMIN — DEXAMETHASONE SODIUM PHOSPHATE 10 MG: 100 INJECTION INTRAMUSCULAR; INTRAVENOUS at 02:08

## 2021-08-19 RX ORDER — METOPROLOL SUCCINATE 25 MG/1
25 TABLET, EXTENDED RELEASE ORAL DAILY
Qty: 30 TABLET | Refills: 11 | Status: SHIPPED | OUTPATIENT
Start: 2021-08-19 | End: 2021-12-14 | Stop reason: SDUPTHER

## 2021-08-20 ENCOUNTER — TELEPHONE (OUTPATIENT)
Dept: NEUROLOGY | Facility: CLINIC | Age: 86
End: 2021-08-20

## 2021-09-02 ENCOUNTER — PATIENT MESSAGE (OUTPATIENT)
Dept: NEUROLOGY | Facility: CLINIC | Age: 86
End: 2021-09-02

## 2021-09-13 ENCOUNTER — TELEPHONE (OUTPATIENT)
Dept: INTERNAL MEDICINE | Facility: CLINIC | Age: 86
End: 2021-09-13

## 2021-09-29 ENCOUNTER — OFFICE VISIT (OUTPATIENT)
Dept: INTERNAL MEDICINE | Facility: CLINIC | Age: 86
End: 2021-09-29
Payer: MEDICARE

## 2021-09-29 VITALS
BODY MASS INDEX: 28.33 KG/M2 | DIASTOLIC BLOOD PRESSURE: 80 MMHG | HEIGHT: 58 IN | TEMPERATURE: 98 F | OXYGEN SATURATION: 96 % | HEART RATE: 92 BPM | SYSTOLIC BLOOD PRESSURE: 110 MMHG | RESPIRATION RATE: 18 BRPM | WEIGHT: 134.94 LBS

## 2021-09-29 DIAGNOSIS — I48.0 PAROXYSMAL ATRIAL FIBRILLATION: ICD-10-CM

## 2021-09-29 DIAGNOSIS — Z00.00 PHYSICAL EXAM, ANNUAL: ICD-10-CM

## 2021-09-29 DIAGNOSIS — Z76.89 ESTABLISHING CARE WITH NEW DOCTOR, ENCOUNTER FOR: Primary | ICD-10-CM

## 2021-09-29 DIAGNOSIS — Z79.01 CURRENT USE OF LONG TERM ANTICOAGULATION: ICD-10-CM

## 2021-09-29 DIAGNOSIS — G47.33 OSA (OBSTRUCTIVE SLEEP APNEA): ICD-10-CM

## 2021-09-29 DIAGNOSIS — Z91.81 AT HIGH RISK FOR FALLS: ICD-10-CM

## 2021-09-29 DIAGNOSIS — I63.411 EMBOLIC STROKE INVOLVING RIGHT MIDDLE CEREBRAL ARTERY: ICD-10-CM

## 2021-09-29 DIAGNOSIS — R26.89 BALANCE PROBLEM: ICD-10-CM

## 2021-09-29 PROCEDURE — 1101F PT FALLS ASSESS-DOCD LE1/YR: CPT | Mod: HCNC,CPTII,S$GLB, | Performed by: STUDENT IN AN ORGANIZED HEALTH CARE EDUCATION/TRAINING PROGRAM

## 2021-09-29 PROCEDURE — 1101F PR PT FALLS ASSESS DOC 0-1 FALLS W/OUT INJ PAST YR: ICD-10-PCS | Mod: HCNC,CPTII,S$GLB, | Performed by: STUDENT IN AN ORGANIZED HEALTH CARE EDUCATION/TRAINING PROGRAM

## 2021-09-29 PROCEDURE — 1126F PR PAIN SEVERITY QUANTIFIED, NO PAIN PRESENT: ICD-10-PCS | Mod: HCNC,CPTII,S$GLB, | Performed by: STUDENT IN AN ORGANIZED HEALTH CARE EDUCATION/TRAINING PROGRAM

## 2021-09-29 PROCEDURE — 99397 PER PM REEVAL EST PAT 65+ YR: CPT | Mod: HCNC,S$GLB,, | Performed by: STUDENT IN AN ORGANIZED HEALTH CARE EDUCATION/TRAINING PROGRAM

## 2021-09-29 PROCEDURE — 1159F MED LIST DOCD IN RCRD: CPT | Mod: HCNC,CPTII,S$GLB, | Performed by: STUDENT IN AN ORGANIZED HEALTH CARE EDUCATION/TRAINING PROGRAM

## 2021-09-29 PROCEDURE — 1126F AMNT PAIN NOTED NONE PRSNT: CPT | Mod: HCNC,CPTII,S$GLB, | Performed by: STUDENT IN AN ORGANIZED HEALTH CARE EDUCATION/TRAINING PROGRAM

## 2021-09-29 PROCEDURE — 99397 PR PREVENTIVE VISIT,EST,65 & OVER: ICD-10-PCS | Mod: HCNC,S$GLB,, | Performed by: STUDENT IN AN ORGANIZED HEALTH CARE EDUCATION/TRAINING PROGRAM

## 2021-09-29 PROCEDURE — 3288F PR FALLS RISK ASSESSMENT DOCUMENTED: ICD-10-PCS | Mod: HCNC,CPTII,S$GLB, | Performed by: STUDENT IN AN ORGANIZED HEALTH CARE EDUCATION/TRAINING PROGRAM

## 2021-09-29 PROCEDURE — 99999 PR PBB SHADOW E&M-EST. PATIENT-LVL IV: CPT | Mod: PBBFAC,HCNC,, | Performed by: STUDENT IN AN ORGANIZED HEALTH CARE EDUCATION/TRAINING PROGRAM

## 2021-09-29 PROCEDURE — 1159F PR MEDICATION LIST DOCUMENTED IN MEDICAL RECORD: ICD-10-PCS | Mod: HCNC,CPTII,S$GLB, | Performed by: STUDENT IN AN ORGANIZED HEALTH CARE EDUCATION/TRAINING PROGRAM

## 2021-09-29 PROCEDURE — 3288F FALL RISK ASSESSMENT DOCD: CPT | Mod: HCNC,CPTII,S$GLB, | Performed by: STUDENT IN AN ORGANIZED HEALTH CARE EDUCATION/TRAINING PROGRAM

## 2021-09-29 PROCEDURE — 99999 PR PBB SHADOW E&M-EST. PATIENT-LVL IV: ICD-10-PCS | Mod: PBBFAC,HCNC,, | Performed by: STUDENT IN AN ORGANIZED HEALTH CARE EDUCATION/TRAINING PROGRAM

## 2021-09-29 RX ORDER — CLOPIDOGREL BISULFATE 75 MG/1
75 TABLET ORAL DAILY
Qty: 30 TABLET | Refills: 0 | Status: SHIPPED | OUTPATIENT
Start: 2021-09-29 | End: 2021-12-14 | Stop reason: ALTCHOICE

## 2021-10-26 ENCOUNTER — DOCUMENT SCAN (OUTPATIENT)
Dept: HOME HEALTH SERVICES | Facility: HOSPITAL | Age: 86
End: 2021-10-26
Payer: MEDICARE

## 2021-12-14 ENCOUNTER — OFFICE VISIT (OUTPATIENT)
Dept: ELECTROPHYSIOLOGY | Facility: CLINIC | Age: 86
End: 2021-12-14
Payer: MEDICARE

## 2021-12-14 ENCOUNTER — HOSPITAL ENCOUNTER (OUTPATIENT)
Dept: CARDIOLOGY | Facility: CLINIC | Age: 86
Discharge: HOME | End: 2021-12-14
Payer: MEDICARE

## 2021-12-14 VITALS — BODY MASS INDEX: 29.71 KG/M2 | WEIGHT: 141.56 LBS | HEIGHT: 58 IN

## 2021-12-14 DIAGNOSIS — M15.9 PRIMARY OSTEOARTHRITIS INVOLVING MULTIPLE JOINTS: ICD-10-CM

## 2021-12-14 DIAGNOSIS — I63.411 EMBOLIC STROKE INVOLVING RIGHT MIDDLE CEREBRAL ARTERY: ICD-10-CM

## 2021-12-14 DIAGNOSIS — G47.33 OSA (OBSTRUCTIVE SLEEP APNEA): ICD-10-CM

## 2021-12-14 DIAGNOSIS — I48.0 PAROXYSMAL ATRIAL FIBRILLATION: Primary | ICD-10-CM

## 2021-12-14 DIAGNOSIS — I63.411 CEREBROVASCULAR ACCIDENT (CVA) DUE TO EMBOLISM OF RIGHT MIDDLE CEREBRAL ARTERY: ICD-10-CM

## 2021-12-14 DIAGNOSIS — K75.81 NASH (NONALCOHOLIC STEATOHEPATITIS): ICD-10-CM

## 2021-12-14 DIAGNOSIS — C34.90 ADENOCARCINOMA OF LUNG, UNSPECIFIED LATERALITY: ICD-10-CM

## 2021-12-14 DIAGNOSIS — E78.2 MIXED HYPERLIPIDEMIA: ICD-10-CM

## 2021-12-14 DIAGNOSIS — I70.0 ATHEROSCLEROSIS OF AORTA: ICD-10-CM

## 2021-12-14 DIAGNOSIS — Z85.3 HISTORY OF BILATERAL BREAST CANCER: ICD-10-CM

## 2021-12-14 DIAGNOSIS — C44.81 BASAL CELL CARCINOMA (BCC) OF OVERLAPPING SITES OF SKIN: ICD-10-CM

## 2021-12-14 DIAGNOSIS — Z79.01 CURRENT USE OF LONG TERM ANTICOAGULATION: ICD-10-CM

## 2021-12-14 PROCEDURE — 93010 RHYTHM STRIP: ICD-10-PCS | Mod: S$GLB,,, | Performed by: INTERNAL MEDICINE

## 2021-12-14 PROCEDURE — 99214 OFFICE O/P EST MOD 30 MIN: CPT | Mod: S$GLB,,, | Performed by: STUDENT IN AN ORGANIZED HEALTH CARE EDUCATION/TRAINING PROGRAM

## 2021-12-14 PROCEDURE — 93005 ELECTROCARDIOGRAM TRACING: CPT | Mod: S$GLB,,, | Performed by: STUDENT IN AN ORGANIZED HEALTH CARE EDUCATION/TRAINING PROGRAM

## 2021-12-14 PROCEDURE — 99499 UNLISTED E&M SERVICE: CPT | Mod: S$GLB,,, | Performed by: STUDENT IN AN ORGANIZED HEALTH CARE EDUCATION/TRAINING PROGRAM

## 2021-12-14 PROCEDURE — 99214 PR OFFICE/OUTPT VISIT, EST, LEVL IV, 30-39 MIN: ICD-10-PCS | Mod: S$GLB,,, | Performed by: STUDENT IN AN ORGANIZED HEALTH CARE EDUCATION/TRAINING PROGRAM

## 2021-12-14 PROCEDURE — 93010 ELECTROCARDIOGRAM REPORT: CPT | Mod: S$GLB,,, | Performed by: INTERNAL MEDICINE

## 2021-12-14 PROCEDURE — 93005 RHYTHM STRIP: ICD-10-PCS | Mod: S$GLB,,, | Performed by: STUDENT IN AN ORGANIZED HEALTH CARE EDUCATION/TRAINING PROGRAM

## 2021-12-14 PROCEDURE — 99999 PR PBB SHADOW E&M-EST. PATIENT-LVL II: ICD-10-PCS | Mod: PBBFAC,,, | Performed by: STUDENT IN AN ORGANIZED HEALTH CARE EDUCATION/TRAINING PROGRAM

## 2021-12-14 PROCEDURE — 99999 PR PBB SHADOW E&M-EST. PATIENT-LVL II: CPT | Mod: PBBFAC,,, | Performed by: STUDENT IN AN ORGANIZED HEALTH CARE EDUCATION/TRAINING PROGRAM

## 2021-12-14 PROCEDURE — 99499 RISK ADDL DX/OHS AUDIT: ICD-10-PCS | Mod: S$GLB,,, | Performed by: STUDENT IN AN ORGANIZED HEALTH CARE EDUCATION/TRAINING PROGRAM

## 2021-12-14 RX ORDER — ATORVASTATIN CALCIUM 40 MG/1
40 TABLET, FILM COATED ORAL DAILY
Qty: 90 TABLET | Refills: 3 | Status: SHIPPED | OUTPATIENT
Start: 2021-12-14 | End: 2022-09-13

## 2021-12-14 RX ORDER — METOPROLOL SUCCINATE 25 MG/1
25 TABLET, EXTENDED RELEASE ORAL DAILY
Qty: 30 TABLET | Refills: 11 | Status: SHIPPED | OUTPATIENT
Start: 2021-12-14 | End: 2022-09-30 | Stop reason: SDUPTHER

## 2022-01-05 ENCOUNTER — HOSPITAL ENCOUNTER (OUTPATIENT)
Facility: HOSPITAL | Age: 87
Discharge: HOME | End: 2022-01-07
Attending: EMERGENCY MEDICINE | Admitting: INTERNAL MEDICINE
Payer: MEDICARE

## 2022-01-05 DIAGNOSIS — R79.89 ELEVATED BRAIN NATRIURETIC PEPTIDE (BNP) LEVEL: ICD-10-CM

## 2022-01-05 DIAGNOSIS — R06.02 SHORTNESS OF BREATH: ICD-10-CM

## 2022-01-05 DIAGNOSIS — R04.2 HEMOPTYSIS: ICD-10-CM

## 2022-01-05 DIAGNOSIS — J90 LOCULATED PLEURAL EFFUSION: Primary | ICD-10-CM

## 2022-01-05 DIAGNOSIS — G47.33 OSA (OBSTRUCTIVE SLEEP APNEA): ICD-10-CM

## 2022-01-05 LAB
ABO + RH BLD: NORMAL
ALBUMIN SERPL BCP-MCNC: 4.1 G/DL (ref 3.5–5.2)
ALP SERPL-CCNC: 67 U/L (ref 55–135)
ALT SERPL W/O P-5'-P-CCNC: 14 U/L (ref 10–44)
ANION GAP SERPL CALC-SCNC: 9 MMOL/L (ref 8–16)
AST SERPL-CCNC: 18 U/L (ref 10–40)
BASOPHILS # BLD AUTO: 0.04 K/UL (ref 0–0.2)
BASOPHILS NFR BLD: 0.7 % (ref 0–1.9)
BILIRUB SERPL-MCNC: 0.9 MG/DL (ref 0.1–1)
BLD GP AB SCN CELLS X3 SERPL QL: NORMAL
BNP SERPL-MCNC: 349 PG/ML (ref 0–99)
BUN SERPL-MCNC: 15 MG/DL (ref 10–30)
CALCIUM SERPL-MCNC: 9.6 MG/DL (ref 8.7–10.5)
CHLORIDE SERPL-SCNC: 105 MMOL/L (ref 95–110)
CO2 SERPL-SCNC: 25 MMOL/L (ref 23–29)
CREAT SERPL-MCNC: 0.6 MG/DL (ref 0.5–1.4)
CTP QC/QA: YES
DIFFERENTIAL METHOD: ABNORMAL
EOSINOPHIL # BLD AUTO: 0.1 K/UL (ref 0–0.5)
EOSINOPHIL NFR BLD: 1 % (ref 0–8)
ERYTHROCYTE [DISTWIDTH] IN BLOOD BY AUTOMATED COUNT: 14.3 % (ref 11.5–14.5)
EST. GFR  (AFRICAN AMERICAN): >60 ML/MIN/1.73 M^2
EST. GFR  (NON AFRICAN AMERICAN): >60 ML/MIN/1.73 M^2
GLUCOSE SERPL-MCNC: 100 MG/DL (ref 70–110)
HCT VFR BLD AUTO: 40.9 % (ref 37–48.5)
HGB BLD-MCNC: 12.7 G/DL (ref 12–16)
IMM GRANULOCYTES # BLD AUTO: 0.02 K/UL (ref 0–0.04)
IMM GRANULOCYTES NFR BLD AUTO: 0.3 % (ref 0–0.5)
LYMPHOCYTES # BLD AUTO: 1.5 K/UL (ref 1–4.8)
LYMPHOCYTES NFR BLD: 24.7 % (ref 18–48)
MCH RBC QN AUTO: 27.4 PG (ref 27–31)
MCHC RBC AUTO-ENTMCNC: 31.1 G/DL (ref 32–36)
MCV RBC AUTO: 88 FL (ref 82–98)
MONOCYTES # BLD AUTO: 0.6 K/UL (ref 0.3–1)
MONOCYTES NFR BLD: 10.8 % (ref 4–15)
NEUTROPHILS # BLD AUTO: 3.7 K/UL (ref 1.8–7.7)
NEUTROPHILS NFR BLD: 62.5 % (ref 38–73)
NRBC BLD-RTO: 0 /100 WBC
PLATELET # BLD AUTO: 179 K/UL (ref 150–450)
PMV BLD AUTO: 9.7 FL (ref 9.2–12.9)
POTASSIUM SERPL-SCNC: 3.8 MMOL/L (ref 3.5–5.1)
PROT SERPL-MCNC: 8.1 G/DL (ref 6–8.4)
RBC # BLD AUTO: 4.63 M/UL (ref 4–5.4)
SARS-COV-2 RDRP RESP QL NAA+PROBE: NEGATIVE
SODIUM SERPL-SCNC: 139 MMOL/L (ref 136–145)
TROPONIN I SERPL DL<=0.01 NG/ML-MCNC: 0.01 NG/ML (ref 0–0.03)
WBC # BLD AUTO: 5.94 K/UL (ref 3.9–12.7)

## 2022-01-05 PROCEDURE — G0378 HOSPITAL OBSERVATION PER HR: HCPCS

## 2022-01-05 PROCEDURE — 25000003 PHARM REV CODE 250: Performed by: HOSPITALIST

## 2022-01-05 PROCEDURE — 83880 ASSAY OF NATRIURETIC PEPTIDE: CPT | Performed by: EMERGENCY MEDICINE

## 2022-01-05 PROCEDURE — 25500020 PHARM REV CODE 255: Performed by: EMERGENCY MEDICINE

## 2022-01-05 PROCEDURE — 93005 ELECTROCARDIOGRAM TRACING: CPT

## 2022-01-05 PROCEDURE — 84484 ASSAY OF TROPONIN QUANT: CPT | Performed by: EMERGENCY MEDICINE

## 2022-01-05 PROCEDURE — 99220 PR INITIAL OBSERVATION CARE,LEVL III: ICD-10-PCS | Mod: ,,, | Performed by: HOSPITALIST

## 2022-01-05 PROCEDURE — 99285 EMERGENCY DEPT VISIT HI MDM: CPT | Mod: 25,CS

## 2022-01-05 PROCEDURE — 96368 THER/DIAG CONCURRENT INF: CPT

## 2022-01-05 PROCEDURE — 63600175 PHARM REV CODE 636 W HCPCS: Performed by: PHYSICIAN ASSISTANT

## 2022-01-05 PROCEDURE — 93010 EKG 12-LEAD: ICD-10-PCS | Mod: ,,, | Performed by: INTERNAL MEDICINE

## 2022-01-05 PROCEDURE — U0002 COVID-19 LAB TEST NON-CDC: HCPCS | Performed by: EMERGENCY MEDICINE

## 2022-01-05 PROCEDURE — 99285 PR EMERGENCY DEPT VISIT,LEVEL V: ICD-10-PCS | Mod: CS,,, | Performed by: PHYSICIAN ASSISTANT

## 2022-01-05 PROCEDURE — 99220 PR INITIAL OBSERVATION CARE,LEVL III: CPT | Mod: ,,, | Performed by: HOSPITALIST

## 2022-01-05 PROCEDURE — 86850 RBC ANTIBODY SCREEN: CPT | Performed by: EMERGENCY MEDICINE

## 2022-01-05 PROCEDURE — 96365 THER/PROPH/DIAG IV INF INIT: CPT | Mod: 59

## 2022-01-05 PROCEDURE — 80053 COMPREHEN METABOLIC PANEL: CPT | Performed by: EMERGENCY MEDICINE

## 2022-01-05 PROCEDURE — 93010 ELECTROCARDIOGRAM REPORT: CPT | Mod: ,,, | Performed by: INTERNAL MEDICINE

## 2022-01-05 PROCEDURE — 99285 EMERGENCY DEPT VISIT HI MDM: CPT | Mod: CS,,, | Performed by: PHYSICIAN ASSISTANT

## 2022-01-05 PROCEDURE — 63600175 PHARM REV CODE 636 W HCPCS: Performed by: HOSPITALIST

## 2022-01-05 PROCEDURE — 85025 COMPLETE CBC W/AUTO DIFF WBC: CPT | Performed by: EMERGENCY MEDICINE

## 2022-01-05 PROCEDURE — 96366 THER/PROPH/DIAG IV INF ADDON: CPT

## 2022-01-05 RX ORDER — AMOXICILLIN 250 MG
1 CAPSULE ORAL 2 TIMES DAILY PRN
Status: DISCONTINUED | OUTPATIENT
Start: 2022-01-05 | End: 2022-01-07 | Stop reason: HOSPADM

## 2022-01-05 RX ORDER — TALC
6 POWDER (GRAM) TOPICAL NIGHTLY PRN
Status: DISCONTINUED | OUTPATIENT
Start: 2022-01-05 | End: 2022-01-07 | Stop reason: HOSPADM

## 2022-01-05 RX ORDER — METOPROLOL SUCCINATE 25 MG/1
25 TABLET, EXTENDED RELEASE ORAL DAILY
Status: DISCONTINUED | OUTPATIENT
Start: 2022-01-06 | End: 2022-01-07 | Stop reason: HOSPADM

## 2022-01-05 RX ORDER — ATORVASTATIN CALCIUM 40 MG/1
40 TABLET, FILM COATED ORAL DAILY
Status: DISCONTINUED | OUTPATIENT
Start: 2022-01-06 | End: 2022-01-07 | Stop reason: HOSPADM

## 2022-01-05 RX ORDER — SODIUM CHLORIDE 0.9 % (FLUSH) 0.9 %
10 SYRINGE (ML) INJECTION EVERY 12 HOURS PRN
Status: DISCONTINUED | OUTPATIENT
Start: 2022-01-05 | End: 2022-01-07 | Stop reason: HOSPADM

## 2022-01-05 RX ORDER — ACETAMINOPHEN 325 MG/1
650 TABLET ORAL EVERY 6 HOURS PRN
Status: DISCONTINUED | OUTPATIENT
Start: 2022-01-05 | End: 2022-01-07 | Stop reason: HOSPADM

## 2022-01-05 RX ADMIN — ACETAMINOPHEN 650 MG: 325 TABLET ORAL at 10:01

## 2022-01-05 RX ADMIN — AZITHROMYCIN MONOHYDRATE 500 MG: 500 INJECTION, POWDER, LYOPHILIZED, FOR SOLUTION INTRAVENOUS at 07:01

## 2022-01-05 RX ADMIN — Medication 6 MG: at 10:01

## 2022-01-05 RX ADMIN — CEFTRIAXONE 2 G: 2 INJECTION, SOLUTION INTRAVENOUS at 05:01

## 2022-01-05 RX ADMIN — IOHEXOL 75 ML: 350 INJECTION, SOLUTION INTRAVENOUS at 02:01

## 2022-01-05 NOTE — ED PROVIDER NOTES
"Encounter Date: 1/5/2022       History     Chief Complaint   Patient presents with    Hemoptysis     Pt reports "spitting blood"     89-year-old female with past medical history CVA, history of bilateral breast cancer with metastatic disease to the lung status post lung resection, bilateral mastectomies, and radiation therapy/ chemo in 1985, hyperlipidemia, paroxysmal atrial fibrillation on Eliquis who presents to the ED with CC of "spitting up blood." Sister at bedside to provide additional history. She reports that she has had a cough for about 2 weeks now. She began to spit up blood one week ago. She denies hemoptysis. She believes the bleeding is due to a sinus issue. She is unable to quantify how much blood is present. She reports mild dyspnea with exertion, denies any at rest. Denies chest pain. Denies headache, congestion, sore throat, fever, abdominal pain, NVD. Denies recent travel, recent surgery, LE edema/pain. Reports nearly 100% compliance with Eliquis. Denies other worsening or alleviating factors.         Review of patient's allergies indicates:  No Known Allergies  Past Medical History:   Diagnosis Date    Allergy     Basal cell carcinoma 2003    Dr. 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 SAQIB Nielsen/ right side of nose and forhead also.    Stroke 2/2/2021     Past Surgical History:   Procedure Laterality Date    BREAST SURGERY Right 1990    x2- Mastectomy    BREAST SURGERY Left     Mastectomy    CATARACT EXTRACTION Bilateral 2012    Dr. Crocker    EYE SURGERY      MASTECTOMY, RADICAL Left 1988     Family History   Problem Relation Age of Onset    Heart failure Mother     Hypertension Mother     Heart " disease Mother     Heart failure Father     Heart disease Father     Heart failure Brother     Hypertension Brother     Heart disease Brother     Hypertension Sister     Hypertension Sister     Heart disease Sister     Cancer Maternal Aunt     Hypertension Maternal Uncle     Hypertension Paternal Aunt     Hypertension Paternal Uncle     Asthma Cousin     Cancer Cousin     Amblyopia Neg Hx     Blindness Neg Hx     Cataracts Neg Hx     Diabetes Neg Hx     Glaucoma Neg Hx     Macular degeneration Neg Hx     Retinal detachment Neg Hx     Strabismus Neg Hx     Stroke Neg Hx     Thyroid disease Neg Hx     Melanoma Neg Hx     Psoriasis Neg Hx     Lupus Neg Hx      Social History     Tobacco Use    Smoking status: Never Smoker    Smokeless tobacco: Never Used   Substance Use Topics    Alcohol use: Yes     Alcohol/week: 0.0 standard drinks     Comment: social    Drug use: No     Review of Systems   Constitutional: Negative for fever.   HENT: Negative for sore throat.    Respiratory: Positive for cough and shortness of breath.    Cardiovascular: Negative for chest pain.   Gastrointestinal: Negative for nausea.   Genitourinary: Negative for dysuria.   Musculoskeletal: Negative for back pain.   Skin: Negative for rash.   Neurological: Negative for weakness.   Hematological: Does not bruise/bleed easily.       Physical Exam     Initial Vitals [01/05/22 1022]   BP Pulse Resp Temp SpO2   137/83 101 16 98.7 °F (37.1 °C) (!) 92 %      MAP       --         Physical Exam    Nursing note and vitals reviewed.  Constitutional: She appears well-developed and well-nourished. She is not diaphoretic. No distress.   HENT:   Head: Normocephalic and atraumatic.   Mouth/Throat: Oropharynx is clear and moist.   Eyes: Conjunctivae and EOM are normal. Pupils are equal, round, and reactive to light.   Neck: Neck supple.   Normal range of motion.  Cardiovascular: Normal rate, regular rhythm, normal heart sounds and  intact distal pulses. Exam reveals no gallop and no friction rub.    No murmur heard.  Pulmonary/Chest: Breath sounds normal. She has no wheezes. She has no rhonchi. She has no rales.   Abdominal: Abdomen is soft. Bowel sounds are normal. There is no abdominal tenderness. There is no rebound and no guarding.   Musculoskeletal:         General: No edema. Normal range of motion.      Cervical back: Normal range of motion and neck supple.     Neurological: She is alert and oriented to person, place, and time. She has normal strength. No cranial nerve deficit or sensory deficit. GCS score is 15. GCS eye subscore is 4. GCS verbal subscore is 5. GCS motor subscore is 6.   Skin: Skin is warm and dry. Capillary refill takes less than 2 seconds.   Psychiatric: She has a normal mood and affect. Her behavior is normal. Judgment and thought content normal.         ED Course   Procedures  Labs Reviewed   B-TYPE NATRIURETIC PEPTIDE - Abnormal; Notable for the following components:       Result Value     (*)     All other components within normal limits   CBC W/ AUTO DIFFERENTIAL - Abnormal; Notable for the following components:    MCHC 31.1 (*)     All other components within normal limits   COMPREHENSIVE METABOLIC PANEL   TROPONIN I   SARS-COV-2 RDRP GENE    Narrative:     This test utilizes isothermal nucleic acid amplification   technology to detect the SARS-CoV-2 RdRp nucleic acid segment.   The analytical sensitivity (limit of detection) is 125 genome   equivalents/mL.   A POSITIVE result implies infection with the SARS-CoV-2 virus;   the patient is presumed to be contagious.     A NEGATIVE result means that SARS-CoV-2 nucleic acids are not   present above the limit of detection. A NEGATIVE result should be   treated as presumptive. It does not rule out the possibility of   COVID-19 and should not be the sole basis for treatment decisions.   If COVID-19 is strongly suspected based on clinical and exposure   history,  re-testing using an alternate molecular assay should be   considered.   This test is only for use under the Food and Drug   Administration s Emergency Use Authorization (EUA).   Commercial kits are provided by [employer]   Performance characteristics of the EUA have been independently   verified by [facility] Department of   Pathology and Laboratory Medicine.   _________________________________________________________________   The authorized Fact Sheet for Healthcare Providers and the authorized Fact   Sheet for Patients of the ID NOW COVID-19 are available on the FDA   website:     https://www.fda.gov/media/888081/download  https://www.fda.gov/media/855049/download       TYPE & SCREEN     EKG Readings: (Independently Interpreted)   Initial Reading: No STEMI. Rhythm: Normal Sinus Rhythm. Heart Rate: 86.     ECG Results          EKG 12-lead (Final result)  Result time 01/05/22 14:49:19    Final result by Interface, Lab In [facility] (01/05/22 14:49:19)                 Narrative:    Test Reason : R04.2,    Vent. Rate : 086 BPM     Atrial Rate : 086 BPM     P-R Int : 170 ms          QRS Dur : 086 ms      QT Int : 380 ms       P-R-T Axes : 062 020 076 degrees     QTc Int : 454 ms    Age and gender specific analysis  Normal sinus rhythm  Normal ECG  When compared with ECG of 14-DEC-2021 07:47,  Criteria for Inferior infarct are no longer Present  T wave inversion no longer evident in Inferior leads    Confirmed by ELISA KERNS MD ([id number]) on 1/5/2022 2:49:12 PM    Referred By: [email or web address]   SELF           Confirmed By:ELISA KERNS MD                            Imaging Results          CTA Chest Non-Coronary (PE Study) (Final result)  Result time 01/05/22 15:14:09    Final result by Deanne Moya MD (01/05/22 15:14:09)                 Impression:      1.  No CT evidence of pulmonary thromboembolic disease to the subsegmental branches with excellent examination quality.    2.  New, small, loculated  right pleural effusion with adjacent to right upper lung zone scarring.    3.  New mild fibrotic changes within the lingula, right middle lobe, and right apex.  Favor infectious/inflammatory etiology.    4.  Mid-distal esophageal wall thickening proximal to a small hiatal hernia; consider reflux esophagitis.    5.  Mildly enlarged paraesophageal a sanaz hepatis lymph nodes, possibly reactive.    6.  Descending thoracic aorta outpouchings of contrast consistent with ulcers versus plaque irregularity.    7.  Stable findings related to double mastectomy and axillary ricardo dissections.      Electronically signed by: Deanne Moya  Date:    01/05/2022  Time:    15:14             Narrative:    EXAMINATION:  CTA CHEST NON CORONARY    CLINICAL HISTORY:  Pulmonary embolism (PE) suspected, high prob;    TECHNIQUE:  Low dose axial images, sagittal and coronal reformations were obtained from the thoracic inlet to the lung bases following the IV administration of 75 mL of Omnipaque 350.  Contrast timing was optimized to evaluate the pulmonary arteries.  MIP images were performed.    COMPARISON:  CT of the chest 01/13/2009.    FINDINGS:  The thyroid gland is not well seen due to beam hardening artifact from high density contrast in the adjacent venous structures.    The trachea and central airways are widely patent.    There is mid-distal esophageal wall thickening proximal to a small hiatal hernia.    There is a 13 mm right paraesophageal lymph node.  There is a 1.6 cm sanaz hepatis lymph node.    There is a small, loculated right pleural effusion, with some fluid noted to be trapped within the fissure.  This appears associated with right apical pleural and parenchymal scarring.    There is right apical pleural and parenchymal thickening.    There is bronchiectasis, linear scarring, in peripheral bronchiolectasis in the lingula greater than the right middle lobe.    There is partial imaging of the upper abdomen.  There is diffuse  fatty atrophy of the pancreas, but no pancreatic duct dilatation.    There is a small fat containing ventral hernia.    The bone density appears diminished.  There is a vertebral body in the midthoracic spine demonstrating mild anterior wedging.    The breast tissues are absent on both sides.  There are surgical clips in both axilla.    There is excellent opacification of the pulmonary arteries.  No pulmonary thromboembolic disease is seen to the subsegmental branches.  The main pulmonary artery does not appear dilated.    The heart appears normal in size.  There is a small anterior pericardial effusion.  There is mild-moderate calcific disease of the coronary arteries bilaterally.  There is exuberant mitral annular calcification.  There is mild calcification of the aortic valve leaflets.    The thoracic aorta is nonaneurysmal.  There are multiple, small outpouchings of contrast with some degree of longitudinal extension concerning for ulcers.                               X-Ray Chest AP Portable (Final result)  Result time 01/05/22 14:37:37    Final result by Codey Smith MD (01/05/22 14:37:37)                 Impression:      Chest findings and recommendations as detailed above.      Electronically signed by: Codey Smith  Date:    01/05/2022  Time:    14:37             Narrative:    EXAMINATION:  XR CHEST AP PORTABLE    CLINICAL HISTORY:  Shortness of breath    TECHNIQUE:  Single frontal view of the chest was performed.    COMPARISON:  February 2, 2021, November 2, 2020    FINDINGS:  Normal heart size.  Mildly tortuous thoracic aorta.  Arch calcification.  Pulmonary vasculature is within limits of normal.  Reconfirmed right hemithorax volume loss with some shift of tracheal air column from midline to right.  Some stable right perihilar and right apical-paramediastinal opacity, likely related to scarring.  Less well inspiratory effort on present exam that could account for some suggested increase in  bronchovascular markings and bilateral paratracheal increased soft tissue density..  Some degree of interstitial edema or interstitial infiltrates and mediastinal adenopathy accounting for these changes not excluded.  Clinical correlation.  Interval development of well-circumscribed oval 2.2 x 4.6 cm opacity centered about the right mid lung zone and area of minor fissure.  This is felt most likely on the basis of some loculated fluid in this area, parenchymal or pleural base mass felt less likely but not totally excluded.  Appearance is atypical for consolidative infiltrate.  Given clinical setting and detrimental changes mentioned above, would suggest further evaluation with at least lateral view of the chest or chest CT.  No pleural fluid blunting right or left costophrenic sulcus..  No free pleural fluid blunting right or left costophrenic sulci.  No acute bony findings.  Stable bilateral chest wall clips and mastectomy findings.                                 Medications   cefTRIAXone (ROCEPHIN) 2 g/50 mL D5W IVPB (2 g Intravenous New Bag 1/5/22 1724)   azithromycin 500 mg in dextrose 5 % 250 mL IVPB (ready to mix system) (0 mg Intravenous Hold 1/5/22 1730)   iohexoL (OMNIPAQUE 350) injection 75 mL (75 mLs Intravenous Given 1/5/22 1433)     Medical Decision Making:   History:   Old Medical Records: I decided to obtain old medical records.  Initial Assessment:   Emergent evaluation of a 89-year-old female who presents to the emergency department with chief complaint of hemoptysis that has been ongoing for week.  Reports cough x2 weeks.  Reports similar symptoms in the past that were associated with sinus issues.  Patient is afebrile, hypoxic, mildly tachycardic, and nontoxic appearing.  Will order labs, imaging, EKG, and continue to monitor.  Differential Diagnosis:   Differential diagnosis includes but is not limited to COVID-19, pneumonia, pulmonary embolism, bronchitis.  Independently Interpreted Test(s):    I have ordered and independently interpreted X-rays - see prior notes.  I have ordered and independently interpreted EKG Reading(s) - see prior notes  Clinical Tests:   Lab Tests: Ordered and Reviewed  Radiological Study: Ordered and Reviewed  Medical Tests: Ordered and Reviewed  ED Management:  COVID swab negative.  No severe metabolic or hematologic derangements.  Troponin 0.010. .   CTA PE study:   No CT evidence of pulmonary thromboembolic disease to the subsegmental branches with excellent examination quality.   New, small, loculated right pleural effusion with adjacent to right upper lung zone scarring.   New mild fibrotic changes within the lingula, right middle lobe, and right apex.  Favor infectious/inflammatory etiology.   Mid-distal esophageal wall thickening proximal to a small hiatal hernia; consider reflux esophagitis.   Mildly enlarged paraesophageal a sanaz hepatis lymph nodes, possibly reactive.   Descending thoracic aorta outpouchings of contrast consistent with ulcers versus plaque irregularity.   Stable findings related to double mastectomy and axillary ricardo dissections.     Will treat with Rocephin and Azithromycin.  Discussed case with pulmonology fellow.  Recommend admission to Hospital Medicine.  NPO at midnight for possible bronchoscopy tomorrow.  Pulm will evaluate the patient and give recommendations.  Discussed plan with patient who is agreeable for admission.  All questions answered. The patient's history, physical exam, and plan of care was discussed with and agreed upon with my supervising physician.                ED Course as of 01/05/22 1751 Wed Jan 05, 2022   1310 WBC: 5.94 [JM]   1311 Hemoglobin: 12.7 [JM]   1311 Hematocrit: 40.9 [JM]   1311 Platelets: 179 [JM]   1319 SARS-CoV-2 RNA, Amplification, Qual: Negative [JM]   1340 BUN: 15 [JM]   1340 Creatinine: 0.6 [JM]   1342 Troponin I: 0.010 [JM]   1342 BNP(!): 349 [JM]      ED Course User Index  [JM] Dee EDGE  KEISHA Bentley             Clinical Impression:   Final diagnoses:  [R06.02] Shortness of breath  [R04.2] Hemoptysis  [J90] Loculated pleural effusion (Primary)          ED Disposition Condition    Observation               Dee Bentley PA-C  01/05/22 [id number]

## 2022-01-05 NOTE — EKG INTERPRETATIONS - EMERGENCY DEPT.
Independently interpreted by MD:  Rate 86, NSR, no stemi, no ectopy, no hypertrophy, nonspecific ST changes

## 2022-01-05 NOTE — ED TRIAGE NOTES
"Samantha Balderrama, an 89 y.o. female presents to the ED for evaluation of hemoptysis that began several days ago. Explains that she has had this in the past and was told it was a "sinus issue". Has a hx of breast cancer. Denies all other complaints. Sister at the bedside.      LOC: The patient is awake, alert and aware of environment with an appropriate affect, the patient is oriented x 3 and speaking appropriately.   APPEARANCE: Patient appears comfortable and in no acute distress, patient is clean and well groomed.  SKIN: The skin is warm and dry, color consistent with ethnicity.   MUSCULOSKELETAL: Patient moving all extremities spontaneously, no swelling noted.  RESPIRATORY: + hemoptysis, +cough. Airway is open and patent, respirations are spontaneous, patient has a normal effort and rate, no accessory muscle use noted.  CARDIAC: Patient has a normal rate and regular rhythm, no edema noted, capillary refill < 3 seconds.   GASTRO: Soft and non tender to palpation, no distention noted.   : Pt denies any pain or frequency with urination.  NEURO: Pt opens eyes spontaneously, behavior appropriate to situation, follows commands.        Chief Complaint   Patient presents with    Hemoptysis     Pt reports "spitting blood"     Review of patient's allergies indicates:  No Known Allergies  Past Medical History:   Diagnosis Date    Allergy     Basal cell carcinoma 2003    Dr. 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 SAQIB Nielsen/ right side of nose and forhead also.    Stroke 2/2/2021       "

## 2022-01-06 PROBLEM — I50.30 (HFPEF) HEART FAILURE WITH PRESERVED EJECTION FRACTION: Status: ACTIVE | Noted: 2022-01-06

## 2022-01-06 LAB
ANION GAP SERPL CALC-SCNC: 9 MMOL/L (ref 8–16)
ASCENDING AORTA: 3.06 CM
AV INDEX (PROSTH): 0.41
AV MEAN GRADIENT: 13 MMHG
AV PEAK GRADIENT: 19 MMHG
AV VALVE AREA: 1.16 CM2
AV VELOCITY RATIO: 0.53
BASOPHILS # BLD AUTO: 0.03 K/UL (ref 0–0.2)
BASOPHILS NFR BLD: 0.5 % (ref 0–1.9)
BSA FOR ECHO PROCEDURE: 1.58 M2
BUN SERPL-MCNC: 13 MG/DL (ref 10–30)
CALCIUM SERPL-MCNC: 9.3 MG/DL (ref 8.7–10.5)
CHLORIDE SERPL-SCNC: 104 MMOL/L (ref 95–110)
CO2 SERPL-SCNC: 24 MMOL/L (ref 23–29)
CREAT SERPL-MCNC: 0.5 MG/DL (ref 0.5–1.4)
CV ECHO LV RWT: 0.51 CM
DIFFERENTIAL METHOD: ABNORMAL
DOP CALC AO PEAK VEL: 2.17 M/S
DOP CALC AO VTI: 43.15 CM
DOP CALC LVOT AREA: 2.8 CM2
DOP CALC LVOT DIAMETER: 1.9 CM
DOP CALC LVOT PEAK VEL: 1.15 M/S
DOP CALC LVOT STROKE VOLUME: 50.02 CM3
DOP CALCLVOT PEAK VEL VTI: 17.65 CM
ECHO LV POSTERIOR WALL: 0.98 CM (ref 0.6–1.1)
EJECTION FRACTION: 65 %
EOSINOPHIL # BLD AUTO: 0.1 K/UL (ref 0–0.5)
EOSINOPHIL NFR BLD: 1.2 % (ref 0–8)
ERYTHROCYTE [DISTWIDTH] IN BLOOD BY AUTOMATED COUNT: 14.2 % (ref 11.5–14.5)
EST. GFR  (AFRICAN AMERICAN): >60 ML/MIN/1.73 M^2
EST. GFR  (NON AFRICAN AMERICAN): >60 ML/MIN/1.73 M^2
FRACTIONAL SHORTENING: 34 % (ref 28–44)
GLUCOSE SERPL-MCNC: 86 MG/DL (ref 70–110)
HCT VFR BLD AUTO: 35.6 % (ref 37–48.5)
HGB BLD-MCNC: 11.6 G/DL (ref 12–16)
HR MV ECHO: 90 BPM
IMM GRANULOCYTES # BLD AUTO: 0.03 K/UL (ref 0–0.04)
IMM GRANULOCYTES NFR BLD AUTO: 0.5 % (ref 0–0.5)
INTERVENTRICULAR SEPTUM: 1.23 CM (ref 0.6–1.1)
LA MAJOR: 4.88 CM
LA MINOR: 5.59 CM
LA WIDTH: 3.49 CM
LEFT ATRIUM SIZE: 3.03 CM
LEFT ATRIUM VOLUME INDEX MOD: 42.9 ML/M2
LEFT ATRIUM VOLUME INDEX: 30.4 ML/M2
LEFT ATRIUM VOLUME MOD: 66 CM3
LEFT ATRIUM VOLUME: 46.84 CM3
LEFT INTERNAL DIMENSION IN SYSTOLE: 2.52 CM (ref 2.1–4)
LEFT VENTRICLE DIASTOLIC VOLUME INDEX: 40.48 ML/M2
LEFT VENTRICLE DIASTOLIC VOLUME: 62.34 ML
LEFT VENTRICLE MASS INDEX: 88 G/M2
LEFT VENTRICLE SYSTOLIC VOLUME INDEX: 14.8 ML/M2
LEFT VENTRICLE SYSTOLIC VOLUME: 22.72 ML
LEFT VENTRICULAR INTERNAL DIMENSION IN DIASTOLE: 3.81 CM (ref 3.5–6)
LEFT VENTRICULAR MASS: 136.1 G
LYMPHOCYTES # BLD AUTO: 1.4 K/UL (ref 1–4.8)
LYMPHOCYTES NFR BLD: 23.3 % (ref 18–48)
MAGNESIUM SERPL-MCNC: 2.1 MG/DL (ref 1.6–2.6)
MCH RBC QN AUTO: 28.4 PG (ref 27–31)
MCHC RBC AUTO-ENTMCNC: 32.6 G/DL (ref 32–36)
MCV RBC AUTO: 87 FL (ref 82–98)
MONOCYTES # BLD AUTO: 0.8 K/UL (ref 0.3–1)
MONOCYTES NFR BLD: 13.2 % (ref 4–15)
MV MEAN GRADIENT: 5 MMHG
NEUTROPHILS # BLD AUTO: 3.6 K/UL (ref 1.8–7.7)
NEUTROPHILS NFR BLD: 61.3 % (ref 38–73)
NRBC BLD-RTO: 0 /100 WBC
PHOSPHATE SERPL-MCNC: 4.2 MG/DL (ref 2.7–4.5)
PISA TR MAX VEL: 2.88 M/S
PLATELET # BLD AUTO: 174 K/UL (ref 150–450)
PMV BLD AUTO: 10.1 FL (ref 9.2–12.9)
POTASSIUM SERPL-SCNC: 3.5 MMOL/L (ref 3.5–5.1)
RA MAJOR: 4.01 CM
RA PRESSURE: 3 MMHG
RA WIDTH: 2.9 CM
RBC # BLD AUTO: 4.09 M/UL (ref 4–5.4)
RIGHT VENTRICULAR END-DIASTOLIC DIMENSION: 2.47 CM
SINUS: 2.87 CM
SODIUM SERPL-SCNC: 137 MMOL/L (ref 136–145)
STJ: 2.98 CM
TDI LATERAL: 0.07 M/S
TDI SEPTAL: 0.05 M/S
TDI: 0.06 M/S
TR MAX PG: 33 MMHG
TRICUSPID ANNULAR PLANE SYSTOLIC EXCURSION: 1.73 CM
TV REST PULMONARY ARTERY PRESSURE: 36 MMHG
WBC # BLD AUTO: 5.89 K/UL (ref 3.9–12.7)

## 2022-01-06 PROCEDURE — 80048 BASIC METABOLIC PNL TOTAL CA: CPT | Performed by: HOSPITALIST

## 2022-01-06 PROCEDURE — 99214 OFFICE O/P EST MOD 30 MIN: CPT | Mod: GC,,, | Performed by: INTERNAL MEDICINE

## 2022-01-06 PROCEDURE — 85025 COMPLETE CBC W/AUTO DIFF WBC: CPT | Performed by: HOSPITALIST

## 2022-01-06 PROCEDURE — G0378 HOSPITAL OBSERVATION PER HR: HCPCS

## 2022-01-06 PROCEDURE — 36415 COLL VENOUS BLD VENIPUNCTURE: CPT | Performed by: HOSPITALIST

## 2022-01-06 PROCEDURE — 84100 ASSAY OF PHOSPHORUS: CPT | Performed by: HOSPITALIST

## 2022-01-06 PROCEDURE — 99214 PR OFFICE/OUTPT VISIT, EST, LEVL IV, 30-39 MIN: ICD-10-PCS | Mod: GC,,, | Performed by: INTERNAL MEDICINE

## 2022-01-06 PROCEDURE — 94761 N-INVAS EAR/PLS OXIMETRY MLT: CPT

## 2022-01-06 PROCEDURE — 27000190 HC CPAP FULL FACE MASK W/VALVE

## 2022-01-06 PROCEDURE — 99900035 HC TECH TIME PER 15 MIN (STAT)

## 2022-01-06 PROCEDURE — 25000003 PHARM REV CODE 250: Performed by: HOSPITALIST

## 2022-01-06 PROCEDURE — 63600175 PHARM REV CODE 636 W HCPCS: Performed by: PHYSICIAN ASSISTANT

## 2022-01-06 PROCEDURE — 83735 ASSAY OF MAGNESIUM: CPT | Performed by: HOSPITALIST

## 2022-01-06 PROCEDURE — 25000003 PHARM REV CODE 250: Performed by: PHYSICIAN ASSISTANT

## 2022-01-06 PROCEDURE — 96375 TX/PRO/DX INJ NEW DRUG ADDON: CPT | Mod: 59

## 2022-01-06 PROCEDURE — 99226 PR SUBSEQUENT OBSERVATION CARE,LEVEL III: ICD-10-PCS | Mod: ,,, | Performed by: PHYSICIAN ASSISTANT

## 2022-01-06 PROCEDURE — 99226 PR SUBSEQUENT OBSERVATION CARE,LEVEL III: CPT | Mod: ,,, | Performed by: PHYSICIAN ASSISTANT

## 2022-01-06 RX ORDER — FUROSEMIDE 10 MG/ML
10 INJECTION INTRAMUSCULAR; INTRAVENOUS ONCE
Status: COMPLETED | OUTPATIENT
Start: 2022-01-06 | End: 2022-01-06

## 2022-01-06 RX ADMIN — Medication 6 MG: at 10:01

## 2022-01-06 RX ADMIN — METOPROLOL SUCCINATE 25 MG: 25 TABLET, EXTENDED RELEASE ORAL at 08:01

## 2022-01-06 RX ADMIN — ACETAMINOPHEN 650 MG: 325 TABLET ORAL at 10:01

## 2022-01-06 RX ADMIN — ATORVASTATIN CALCIUM 40 MG: 40 TABLET, FILM COATED ORAL at 08:01

## 2022-01-06 RX ADMIN — FUROSEMIDE 10 MG: 10 INJECTION, SOLUTION INTRAVENOUS at 04:01

## 2022-01-06 RX ADMIN — APIXABAN 5 MG: 5 TABLET, FILM COATED ORAL at 09:01

## 2022-01-06 NOTE — ASSESSMENT & PLAN NOTE
Small-volume and likely precipitated by anticoagulation.  Initially held AC, but will restart and continue to monitor.     -CT chest showing small effusion on R with moderate-sized pocket of fluid trapped in interlobar fissure, but likely not easily amenable to drainage without significant risk of traumatic injury to lung from procedure.  -Pulmonology consulted appreciate recs.   -Given her lack of symptomatology and clinical disposition, this presentation is not likely hemoptysis. Although, if it is, this could possibly an exacerbation of underlying bronchiectasis; however, this would be an atypical presentation given the absence of other respiratory symptoms. Imaging findings likely associated with underlying HFpEF, would recommend follow up with CXR with PCP for reevaluation of those image findings.    - Recommend repeat CXR in 6 weeks with PCP for interstitial edema and R lung finding. If she develops  worsening respiratory symptoms, follow up with pulmonology clinic would be welcomed. Although unlikely  given symptomatology, OK with short course of amoxicillin for possible bronchiectasis exacerbation.

## 2022-01-06 NOTE — H&P
"[facility] [facility]  History & Physical    Patient Name: Samantha Balderrama  MRN: [id number]  Patient Class: OP- Observation  Admission Date: 1/5/2022  Attending Physician: Estela Roberts MD   Primary Care Provider: Grover Bardales MD         Patient information was obtained from patient, past medical records and ER records.     Subjective:     Principal Problem:Hemoptysis    Chief Complaint:   Chief Complaint   Patient presents with    Hemoptysis     Pt reports "spitting blood"        HPI: 89F presented to ED after spitting up blood for a couple weeks.  She didn't come get evaluated earlier because she didn't want to risk being hospitalized over [place] or New Year's, and she'd only been bringing up small amounts of blood occasionally.  She denies any chest pain or shortness of breath.  No fevers or chills.  She has a remote history of breast cancer with spread to the lungs s/p surgical resection and chemotherapy in the 1980s.  She is on Eliquis for atrial fibrillation.  She has been blowing her nose thinking that the blood might be coming from there but there has not been any blood yielded that way.      Past Medical History:   Diagnosis Date    Allergy     Basal cell carcinoma 2003    Dr. 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 SAQIB Nielsen/ right side of nose and forhead also.    Stroke 2/2/2021       Past Surgical History:   Procedure Laterality Date    BREAST SURGERY Right 1990    x2- Mastectomy    BREAST SURGERY Left     Mastectomy    CATARACT EXTRACTION Bilateral 2012    Dr. Crocker    EYE SURGERY      MASTECTOMY, RADICAL Left 1988       Review of patient's allergies " indicates:  No Known Allergies    No current facility-administered medications on file prior to encounter.     Current Outpatient Medications on File Prior to Encounter   Medication Sig    apixaban (ELIQUIS) 5 mg Tab Take 1 tablet (5 mg total) by mouth 2 (two) times daily.    atorvastatin (LIPITOR) 40 MG tablet Take 1 tablet (40 mg total) by mouth once daily.    metoprolol succinate (TOPROL-XL) 25 MG 24 hr tablet Take 1 tablet (25 mg total) by mouth once daily.    benzonatate (TESSALON) 200 MG capsule Take 1 capsule (200 mg total) by mouth 3 (three) times daily as needed for Cough. (Patient not taking: Reported on 9/29/2021)    salt moisturizing solution no1 (OCEAN ULTRA SALINE MIST) Mist 2 Pump by Nasal route 2 (two) times daily as needed.    VIT A/VIT C/VIT E/ZINC/COPPER (PRESERVISION AREDS ORAL) Take by mouth once daily.      Family History     Problem Relation (Age of Onset)    Asthma Cousin    Cancer Maternal Aunt, Cousin    Heart disease Mother, Father, Brother, Sister    Heart failure Mother, Father, Brother    Hypertension Mother, Brother, Sister, Sister, Maternal Uncle, Paternal Aunt, Paternal Uncle        Tobacco Use    Smoking status: Never Smoker    Smokeless tobacco: Never Used   Substance and Sexual Activity    Alcohol use: Yes     Alcohol/week: 0.0 standard drinks     Comment: social    Drug use: No    Sexual activity: Not Currently     Review of Systems   Constitutional: Negative for activity change, appetite change, chills, fatigue and fever.   HENT: Positive for congestion. Negative for nosebleeds and postnasal drip.    Eyes: Negative for photophobia and visual disturbance.   Respiratory: Positive for cough. Negative for shortness of breath.    Cardiovascular: Negative for chest pain and leg swelling.   Gastrointestinal: Negative for abdominal pain, nausea and vomiting.   Endocrine: Negative for polydipsia and polyuria.   Genitourinary: Negative for dysuria and hematuria.    Musculoskeletal: Negative for arthralgias and myalgias.   Skin: Negative for color change and rash.   Neurological: Negative for dizziness, syncope and headaches.   Hematological: Bruises/bleeds easily.     Objective:     Vital Signs (Most Recent):  Temp: 98.6 °F (37 °C) (01/06/22 0524)  Pulse: 73 (01/06/22 0524)  Resp: 18 (01/06/22 0524)  BP: (!) 141/69 (01/06/22 0524)  SpO2: 98 % (01/06/22 0524) Vital Signs (24h Range):  Temp:  [97 °F (36.1 °C)-98.7 °F (37.1 °C)] 98.6 °F (37 °C)  Pulse:  [] 73  Resp:  [14-20] 18  SpO2:  [92 %-98 %] 98 %  BP: (107-150)/(57-87) 141/69     Weight: 61.2 kg (134 lb 14.7 oz)  Body mass index is 28.2 kg/m².    Physical Exam  Vitals and nursing note reviewed.   Constitutional:       General: She is not in acute distress.     Appearance: Normal appearance. She is normal weight.   HENT:      Head: Normocephalic and atraumatic.      Nose: Nose normal.      Mouth/Throat:      Mouth: Mucous membranes are moist.      Pharynx: Oropharynx is clear.   Eyes:      General: No scleral icterus.     Extraocular Movements: Extraocular movements intact.      Conjunctiva/sclera: Conjunctivae normal.      Pupils: Pupils are equal, round, and reactive to light.   Cardiovascular:      Rate and Rhythm: Normal rate. Rhythm irregular.      Pulses: Normal pulses.      Heart sounds: Normal heart sounds. No murmur heard.  No gallop.    Pulmonary:      Effort: Pulmonary effort is normal.      Breath sounds: Rales (right lung base) present.   Abdominal:      General: Bowel sounds are normal. There is no distension.      Palpations: Abdomen is soft.      Tenderness: There is no abdominal tenderness. There is no guarding.   Musculoskeletal:         General: No tenderness, deformity or signs of injury. Normal range of motion.      Cervical back: Normal range of motion and neck supple. No rigidity.      Right lower leg: No edema.      Left lower leg: No edema.   Lymphadenopathy:      Cervical: No cervical  adenopathy.   Skin:     General: Skin is warm and dry.      Findings: No erythema or rash.   Neurological:      Mental Status: She is alert and oriented to person, place, and time.      Cranial Nerves: No cranial nerve deficit.           CRANIAL NERVES     CN III, IV, VI   Pupils are equal, round, and reactive to light.       Significant Labs:   All pertinent labs within the past 24 hours have been reviewed.  CBC:   Recent Labs   Lab 01/05/22  1252 01/06/22  0335   WBC 5.94 5.89   HGB 12.7 11.6*   HCT 40.9 35.6*    174     CMP:   Recent Labs   Lab 01/05/22  1252 01/06/22  0335    137   K 3.8 3.5    104   CO2 25 24    86   BUN 15 13   CREATININE 0.6 0.5   CALCIUM 9.6 9.3   PROT 8.1  --    ALBUMIN 4.1  --    BILITOT 0.9  --    ALKPHOS 67  --    AST 18  --    ALT 14  --    ANIONGAP 9 9   EGFRNONAA >60.0 >60.0       Significant Imaging: I have reviewed all pertinent imaging results/findings within the past 24 hours.    Assessment/Plan:     * Hemoptysis  Small-volume and likely precipitated by anticoagulation.  Holding AC and will monitor.  Pulmonology consulted but anticipate conservative monitoring.  CT chest showing small effusion on R with moderate-sized pocket of fluid trapped in interlobar fissure, but likely not easily amenable to drainage without significant risk of traumatic injury to lung from procedure.      Pleural effusion  Likely too small/too risky to sample as mentioned above.  Pulmonology evaluation pending.      Current use of long term anticoagulation  Holding eliquis for hemoptysis.      Paroxysmal atrial fibrillation  On eliquis BID; will hold in setting of hemoptysis. Currently rate-controlled.      BASILIO (obstructive sleep apnea)  CPAP QHS.        VTE Risk Mitigation (From admission, onward)         Ordered     Reason for No Pharmacological VTE Prophylaxis  Once        Question:  Reasons:  Answer:  Already adequately anticoagulated on oral Anticoagulants    01/05/22 1943      IP VTE HIGH RISK PATIENT  Once         01/05/22 1943     Place sequential compression device  Until discontinued         01/05/22 1943                   Bang Beyer MD  Department of Hospital Medicine   [facility] - Telemetry Stepdown ([place]-[facility])

## 2022-01-06 NOTE — CONSULTS
[facility] - Tele[facility]down ([place] [address])  Pulmonology  Consult Note    Patient Name: Samantha Balderrama  MRN: [id number]  Admission Date: 1/5/2022  Hospital Length of Stay: 0 days  Code Status: Full Code  Attending Physician: Al Velarde MD  Primary Care Provider: Grover Bardales MD   Principal Problem: Hemoptysis    Inpatient consult to Pulmonology  Consult performed by: Varun Hidalgo MD  Consult ordered by: Bang Beyer MD  Reason for consult: hemoptysis        Subjective:     HPI:  89F presented to ED after spitting up blood for a couple weeks.  She didn't come get evaluated earlier because she didn't want to risk being hospitalized over [place] or New Year's, and she'd only been bringing up small amounts of blood occasionally.  She experiences a chronic cough when laying which is not productive or worsening. She states that she never coughs with the blood or vomits with blood. She wipes her mouth and a small amount of blood is present. She is unable to quantify further. She denies any chest pain or shortness of breath.  No fevers or chills.  She has a remote history of breast cancer with spread to the lungs s/p surgical resection vs biopsy? and chemotherapy and radiation per patient in the 1980s.  She is on Eliquis for atrial fibrillation.       Past Medical History:   Diagnosis Date    Allergy     Basal cell carcinoma 2003    Dr. 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 SAQIB Nielsen/ right side of nose and forhead also.    Stroke 2/2/2021       Past Surgical History:   Procedure Laterality Date    BREAST SURGERY Right 1990    x2- Mastectomy    BREAST SURGERY Left     Mastectomy    CATARACT EXTRACTION  Bilateral 2012    Dr. Crocker    EYE SURGERY      MASTECTOMY, RADICAL Left 1988       Review of patient's allergies indicates:  No Known Allergies    Family History     Problem Relation (Age of Onset)    Asthma Cousin    Cancer Maternal Aunt, Cousin    Heart disease Mother, Father, Brother, Sister    Heart failure Mother, Father, Brother    Hypertension Mother, Brother, Sister, Sister, Maternal Uncle, Paternal Aunt, Paternal Uncle        Tobacco Use    Smoking status: Never Smoker    Smokeless tobacco: Never Used   Substance and Sexual Activity    Alcohol use: Yes     Alcohol/week: 0.0 standard drinks     Comment: social    Drug use: No    Sexual activity: Not Currently         Review of Systems   Constitutional: Negative for activity change, appetite change, chills, fatigue and fever.   HENT: Positive for congestion. Negative for nosebleeds and postnasal drip.    Eyes: Negative for photophobia and visual disturbance.   Respiratory: Positive for cough (chronic when reclined, non productive) and wheezing. Negative for shortness of breath.    Cardiovascular: Negative for chest pain and leg swelling.   Gastrointestinal: Negative for abdominal pain and vomiting.   Endocrine: Negative for polydipsia and polyuria.   Genitourinary: Negative for dysuria and hematuria.   Musculoskeletal: Negative for arthralgias and myalgias.   Skin: Negative for color change and rash.   Neurological: Negative for dizziness and headaches.   Hematological: Bruises/bleeds easily.     Objective:     Vital Signs (Most Recent):  Temp: 97.3 °F (36.3 °C) (01/06/22 0809)  Pulse: 72 (01/06/22 0809)  Resp: 18 (01/06/22 0809)  BP: (!) 145/62 (01/06/22 0809)  SpO2: (!) 93 % (01/06/22 0809) Vital Signs (24h Range):  Temp:  [97 °F (36.1 °C)-98.6 °F (37 °C)] 97.3 °F (36.3 °C)  Pulse:  [72-94] 72  Resp:  [14-20] 18  SpO2:  [93 %-98 %] 93 %  BP: (107-150)/(57-87) 145/62     Weight: 61.2 kg (134 lb 14.7 oz)  Body mass index is 28.2  kg/m².      Intake/Output Summary (Last 24 hours) at 1/6/2022 1134  Last data filed at 1/5/2022 2000  Gross per 24 hour   Intake 300 ml   Output --   Net 300 ml       Physical Exam  Vitals and nursing note reviewed.   Constitutional:       General: She is not in acute distress.     Appearance: Normal appearance. She is normal weight.      Comments: frail   HENT:      Head: Normocephalic and atraumatic.      Nose: Nose normal.      Mouth/Throat:      Mouth: Mucous membranes are moist.      Pharynx: Oropharynx is clear.   Eyes:      Conjunctiva/sclera: Conjunctivae normal.   Cardiovascular:      Rate and Rhythm: Normal rate. Rhythm irregular.      Pulses: Normal pulses.      Heart sounds: Normal heart sounds. No murmur heard.  No gallop.    Pulmonary:      Effort: Pulmonary effort is normal. No respiratory distress.      Breath sounds: Wheezing present. No rales.   Abdominal:      General: Abdomen is flat. Bowel sounds are normal.      Palpations: Abdomen is soft.   Musculoskeletal:         General: Normal range of motion.      Cervical back: Normal range of motion and neck supple.      Right lower leg: No edema.      Left lower leg: No edema.   Skin:     General: Skin is warm and dry.      Findings: No erythema or rash.   Neurological:      Mental Status: She is alert and oriented to person, place, and time.      Cranial Nerves: No cranial nerve deficit.   Psychiatric:         Mood and Affect: Mood normal.         Behavior: Behavior normal.         Vents:       Lines/Drains/Airways     Peripheral Intravenous Line                 Peripheral IV - Single Lumen 01/05/22 1305 20 G Left Antecubital <1 day                Significant Labs:    CBC/Anemia Profile:  Recent Labs   Lab 01/05/22  1252 01/06/22  0335   WBC 5.94 5.89   HGB 12.7 11.6*   HCT 40.9 35.6*    174   MCV 88 87   RDW 14.3 14.2        Chemistries:  Recent Labs   Lab 01/05/22  1252 01/06/22  0335    137   K 3.8 3.5    104   CO2 25 24   BUN  15 13   CREATININE 0.6 0.5   CALCIUM 9.6 9.3   ALBUMIN 4.1  --    PROT 8.1  --    BILITOT 0.9  --    ALKPHOS 67  --    ALT 14  --    AST 18  --    MG  --  2.1   PHOS  --  4.2       Significant Imaging:   I have reviewed all pertinent imaging results/findings within the past 24 hours.    Assessment/Plan:     * Hemoptysis  89F presented to ED after spitting up blood for a couple weeks.  She experiences a chronic cough when laying which is not productive or worsening. Cough never associated with the blood. No hematemesis. She denies any chest pain or shortness of breath.  No fevers or chills. Hx of breast cancer, mets to the lungs s/p surgical resection vs biopsy? and chemotherapy and radiation per patient in the 1980s.  She is on Eliquis for atrial fibrillation.     Given her lack of symptomatology and clinical disposition, this presentation is not likely hemoptysis. Although, if it is, this could possibly an exacerbation of underlying bronchiectasis; however, this would be an atypical presentation given the absence of other respiratory symptoms. Patient at this time is unable to accurately quantify blood produced during episodes, and she has been not exhibited the finding while in the hospital. She states that she wipes her mouth and finds it on napkins rather than coughing up the blood. She does have some wheezing on exam; however, she reports that this does not effect her quality of life. Given her disposition and Afib, would not start albuterol inhaler at this time.  Imaging findings likely associated with underlying HFpEF, would recommend follow up with CXR with PCP for reevaluation of those image findings.     - Recommend repeat CXR in 6 weeks with PCP for interstitial edema and R lung finding.   - If she develops worsening respiratory symptoms, follow up with pulmonology clinic would be welcomed  - Although unlikely given symptomatology, OK with short course of amoxicillin for possible bronchiectasis  exacerbation.     Paroxysmal atrial fibrillation  Deferred management of care to primary team.      BASILIO (obstructive sleep apnea)  Deferred management of care to primary team.            Thank you for your consult. I will sign off. Please contact us if you have any additional questions.     Varun Hidalgo MD  Pulmonology  [facility] - [facility] ([place]-[facility])

## 2022-01-06 NOTE — SUBJECTIVE & OBJECTIVE
Past Medical History:   Diagnosis Date    Allergy     Basal cell carcinoma 2003    Dr. 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 SAQIB Nielsen/ right side of nose and forhead also.    Stroke 2/2/2021       Past Surgical History:   Procedure Laterality Date    BREAST SURGERY Right 1990    x2- Mastectomy    BREAST SURGERY Left     Mastectomy    CATARACT EXTRACTION Bilateral 2012    Dr. Crocker    EYE SURGERY      MASTECTOMY, RADICAL Left 1988       Review of patient's allergies indicates:  No Known Allergies    Family History     Problem Relation (Age of Onset)    Asthma Cousin    Cancer Maternal Aunt, Cousin    Heart disease Mother, Father, Brother, Sister    Heart failure Mother, Father, Brother    Hypertension Mother, Brother, Sister, Sister, Maternal Uncle, Paternal Aunt, Paternal Uncle        Tobacco Use    Smoking status: Never Smoker    Smokeless tobacco: Never Used   Substance and Sexual Activity    Alcohol use: Yes     Alcohol/week: 0.0 standard drinks     Comment: social    Drug use: No    Sexual activity: Not Currently         Review of Systems   Constitutional: Negative for activity change, appetite change, chills, fatigue and fever.   HENT: Positive for congestion. Negative for nosebleeds and postnasal drip.    Eyes: Negative for photophobia and visual disturbance.   Respiratory: Positive for cough (chronic when reclined, non productive) and wheezing. Negative for shortness of breath.    Cardiovascular: Negative for chest pain and leg swelling.   Gastrointestinal: Negative for abdominal pain and vomiting.   Endocrine: Negative for polydipsia and polyuria.   Genitourinary: Negative for dysuria and hematuria.    Musculoskeletal: Negative for arthralgias and myalgias.   Skin: Negative for color change and rash.   Neurological: Negative for dizziness and headaches.   Hematological: Bruises/bleeds easily.     Objective:     Vital Signs (Most Recent):  Temp: 97.3 °F (36.3 °C) (01/06/22 0809)  Pulse: 72 (01/06/22 0809)  Resp: 18 (01/06/22 0809)  BP: (!) 145/62 (01/06/22 0809)  SpO2: (!) 93 % (01/06/22 0809) Vital Signs (24h Range):  Temp:  [97 °F (36.1 °C)-98.6 °F (37 °C)] 97.3 °F (36.3 °C)  Pulse:  [72-94] 72  Resp:  [14-20] 18  SpO2:  [93 %-98 %] 93 %  BP: (107-150)/(57-87) 145/62     Weight: 61.2 kg (134 lb 14.7 oz)  Body mass index is 28.2 kg/m².      Intake/Output Summary (Last 24 hours) at 1/6/2022 1134  Last data filed at 1/5/2022 2000  Gross per 24 hour   Intake 300 ml   Output --   Net 300 ml       Physical Exam  Vitals and nursing note reviewed.   Constitutional:       General: She is not in acute distress.     Appearance: Normal appearance. She is normal weight.      Comments: frail   HENT:      Head: Normocephalic and atraumatic.      Nose: Nose normal.      Mouth/Throat:      Mouth: Mucous membranes are moist.      Pharynx: Oropharynx is clear.   Eyes:      Conjunctiva/sclera: Conjunctivae normal.   Cardiovascular:      Rate and Rhythm: Normal rate. Rhythm irregular.      Pulses: Normal pulses.      Heart sounds: Normal heart sounds. No murmur heard.  No gallop.    Pulmonary:      Effort: Pulmonary effort is normal. No respiratory distress.      Breath sounds: Wheezing present. No rales.   Abdominal:      General: Abdomen is flat. Bowel sounds are normal.      Palpations: Abdomen is soft.   Musculoskeletal:         General: Normal range of motion.      Cervical back: Normal range of motion and neck supple.      Right lower leg: No edema.      Left lower leg: No edema.   Skin:     General: Skin is warm and dry.      Findings: No erythema or rash.   Neurological:      Mental Status: She is alert and oriented to  person, place, and time.      Cranial Nerves: No cranial nerve deficit.   Psychiatric:         Mood and Affect: Mood normal.         Behavior: Behavior normal.         Vents:       Lines/Drains/Airways     Peripheral Intravenous Line                 Peripheral IV - Single Lumen 01/05/22 1305 20 G Left Antecubital <1 day                Significant Labs:    CBC/Anemia Profile:  Recent Labs   Lab 01/05/22  1252 01/06/22  0335   WBC 5.94 5.89   HGB 12.7 11.6*   HCT 40.9 35.6*    174   MCV 88 87   RDW 14.3 14.2        Chemistries:  Recent Labs   Lab 01/05/22  1252 01/06/22  0335    137   K 3.8 3.5    104   CO2 25 24   BUN 15 13   CREATININE 0.6 0.5   CALCIUM 9.6 9.3   ALBUMIN 4.1  --    PROT 8.1  --    BILITOT 0.9  --    ALKPHOS 67  --    ALT 14  --    AST 18  --    MG  --  2.1   PHOS  --  4.2       Significant Imaging:   I have reviewed all pertinent imaging results/findings within the past 24 hours.

## 2022-01-06 NOTE — PLAN OF CARE
[facility] - Telemetry [facility]down ([facility])  Discharge Assessment    Primary Care Provider: Grover Bardales MD     Discharge Assessment (most recent)       BRIEF DISCHARGE ASSESSMENT - 01/06/22 1309          Discharge Planning    Assessment Type Discharge Planning Brief Assessment (P)      Support Systems Family members;Friends/neighbors (P)      Equipment Currently Used at Home cane, straight (P)      Current Living Arrangements home/apartment/condo (P)      Discharge Plan A Home with family (P)      Discharge Plan B Home with family;Home Health (P)                        SW completed Discharge Planning Assessment with patient/family via bedside. Discharge planning booklet given to patient/family and whiteboard updated with BHARAT and phone #. All questions answered.    Patient reported that family will provide transportation upon discharge.     Patient/Family reported that patient lives with her sister and prior to hospitalization she needed some assistance with her ADL's. Patient/Family reported patient is currently receiving sitter services two days a week and they are considering adding a third day. Patient reported she is not on dialysis and she does not go to a Coumadin clinic.     Floridalma Abbott LMSW  [facility]  Ext. [phone]

## 2022-01-06 NOTE — NURSING
Pt arrived to unit via wheelchair, alert and oriented x4.  Oriented to room and precautions. No c/o pain or distress, in for concern r/t blood tinged sputum expressed with infrequent cough.  Lungs CTA, diminished; 96% RA.  Ambulatory with cane (at bedside), [place] with hearing aid on. 20g SL to LAC patent; secured. Niece in to visit, present in aunts life. Given Tylenol for mild headache, Melatonin for rest aid HS.  Prefers warm milk at bedtime.  NPO post MN.  Resting comfortable at this time, will continue to monitor and treat as ordered.

## 2022-01-06 NOTE — ASSESSMENT & PLAN NOTE
-Pt euvolemic on exam  -  -CXR reveals some degree of interstitial edema or interstitial infiltrates and mediastinal adenopathy accounting for these changes not excluded.  Clinical correlation. See full report  -last echo 02/2021 revealed The left ventricle is normal in size with concentric remodeling and normal systolic function. The estimated ejection fraction is 65%. Indeterminate left ventricular diastolic function.  -Repeating echo  -gently diuresing w/ lasix 10 mg IV x1  -strict I&Os, daily wts,  -low sodium diet

## 2022-01-06 NOTE — HOSPITAL COURSE
Pt placed in observation for suspicious presentation for hemoptysis. Held Pt eliquis for suspicious hemoptysis. CT revealed no evidence of pulmonary thromboembolic disease to the subsegmental branches with excellent examination quality. New, small, loculated right pleural effusion with adjacent to right upper lung zone scarring. New mild fibrotic changes within the lingula, right middle lobe, and right apex.  Favor infectious/inflammatory etiology. Mid-distal esophageal wall thickening proximal to a small hiatal hernia; consider reflux esophagitis. CXR suggestive of interstitial edema or interstitial infiltrates. See full radiology reports.   Pulmonology consulted and assessed given her lack of symptomatology and clinical disposition, this presentation is not likely hemoptysis. Imaging findings likely associated with underlying HFpEF.   BNP elevated. Pt not grossly overloaded on exam. TTE showed EF 65%, Indeterminate left ventricular diastolic function.   Pt restarted on home eliquis prior to discharge, no evidence of hemoptysis. Pt given PCP appointment in 6 weeks for repeat CXR per pulm recs. Pt educated on hospital course and plan, verbally agrees and understands. All questions answered.

## 2022-01-06 NOTE — ASSESSMENT & PLAN NOTE
Current use of long term AC  -On eliquis BID; initially held d/t suspicion of hemoptysis. Resumed 1/6/22  -Currently rate-controlled on Toprol xl 25 qd

## 2022-01-06 NOTE — PROGRESS NOTES
[facility] - Tele[facility]down ([place] [address])  [facility] Medicine  Progress Note    Patient Name: Samantha Balderrama  MRN: [id number]  Patient Class: OP- Observation   Admission Date: 1/5/2022  Length of Stay: 0 days  Attending Physician: Al Velarde MD  Primary Care Provider: Grover Bardales MD        Subjective:     Principal Problem:Hemoptysis        HPI:  89F presented to ED after spitting up blood for a couple weeks.  She didn't come get evaluated earlier because she didn't want to risk being hospitalized over Serg or New Year's, and she'd only been bringing up small amounts of blood occasionally.  She denies any chest pain or shortness of breath.  No fevers or chills.  She has a remote history of breast cancer with spread to the lungs s/p surgical resection and chemotherapy in the 1980s.  She is on Eliquis for atrial fibrillation.  She has been blowing her nose thinking that the blood might be coming from there but there has not been any blood yielded that way.      Overview/Hospital Course:  Pt placed in observation for suspicious presentation for hemoptysis. Held Pt eliquis for suspicious hemoptysis. CT revealed no evidence of pulmonary thromboembolic disease to the subsegmental branches with excellent examination quality. New, small, loculated right pleural effusion with adjacent to right upper lung zone scarring. New mild fibrotic changes within the lingula, right middle lobe, and right apex.  Favor infectious/inflammatory etiology. Mid-distal esophageal wall thickening proximal to a small hiatal hernia; consider reflux esophagitis. CXR suggestive of interstitial edema or interstitial infiltrates. See full radiology reports.   Pulmonology consulted and assessed given her lack of symptomatology and clinical disposition, this presentation is not likely hemoptysis. Imaging findings likely associated with underlying HFpEF.   BNP elevated, repeating TTE. Pt not grossly overloaded on exam.       Interval  History: NAEON. Pt seen and evaluated at bedside. Pt does not reports no new hemoptysis, but does endorses chronic cough. Not grossly overloaded on exam, however, with elevated BNP and review of previous echo suggestive of HF and CXR w/ interstitial edema, Pt may benefit from gentle diuresis. Repeating echo. Pulm evaluated and suggests presentation less likely hemoptysis d/t bronchiectasis.    Review of Systems   Constitutional: Negative for activity change, appetite change, chills, fatigue and fever.   HENT: Positive for congestion. Negative for nosebleeds and postnasal drip.    Eyes: Negative for photophobia and visual disturbance.   Respiratory: Positive for cough (chronic, non productive) and wheezing. Negative for shortness of breath.    Cardiovascular: Negative for chest pain and leg swelling.   Gastrointestinal: Negative for abdominal pain and vomiting.   Endocrine: Negative for polydipsia and polyuria.   Genitourinary: Negative for dysuria and hematuria.   Musculoskeletal: Negative for arthralgias and myalgias.   Skin: Negative for color change and rash.   Neurological: Negative for dizziness and headaches.   Hematological: Bruises/bleeds easily.     Objective:     Vital Signs (Most Recent):  Temp: 97.5 °F (36.4 °C) (01/06/22 1136)  Pulse: 86 (01/06/22 1136)  Resp: 18 (01/06/22 1136)  BP: (!) 155/64 (01/06/22 1136)  SpO2: (!) 92 % (01/06/22 1136) Vital Signs (24h Range):  Temp:  [97 °F (36.1 °C)-98.6 °F (37 °C)] 97.5 °F (36.4 °C)  Pulse:  [72-92] 86  Resp:  [16-20] 18  SpO2:  [92 %-98 %] 92 %  BP: (107-155)/(57-73) 155/64     Weight: 61.2 kg (134 lb 14.7 oz)  Body mass index is 28.2 kg/m².    Intake/Output Summary (Last 24 hours) at 1/6/2022 1259  Last data filed at 1/5/2022 2000  Gross per 24 hour   Intake 300 ml   Output --   Net 300 ml      Physical Exam  Vitals and nursing note reviewed.   Constitutional:       General: She is not in acute distress.     Appearance: She is well-developed. She is not  diaphoretic.   HENT:      Head: Normocephalic and atraumatic.      Right Ear: External ear normal.      Left Ear: External ear normal.      Nose: Nose normal. No congestion.      Mouth/Throat:      Pharynx: Oropharynx is clear.   Eyes:      General: No scleral icterus.     Extraocular Movements: Extraocular movements intact.   Cardiovascular:      Rate and Rhythm: Normal rate and regular rhythm.      Pulses: Normal pulses.      Heart sounds: Normal heart sounds. No murmur heard.      Pulmonary:      Effort: Pulmonary effort is normal. No respiratory distress.      Breath sounds: Wheezing present. No rales.   Abdominal:      General: Bowel sounds are normal. There is no distension.      Palpations: Abdomen is soft.      Tenderness: There is no abdominal tenderness. There is no guarding or rebound.   Musculoskeletal:      Cervical back: Normal range of motion.      Right lower leg: No edema.      Left lower leg: No edema.   Skin:     General: Skin is warm and dry.      Capillary Refill: Capillary refill takes less than 2 seconds.   Neurological:      General: No focal deficit present.      Mental Status: She is alert and oriented to person, place, and time. Mental status is at baseline.   Psychiatric:         Mood and Affect: Mood normal.         Behavior: Behavior normal.         Thought Content: Thought content normal.         Significant Labs: All pertinent labs within the past 24 hours have been reviewed.    Significant Imaging: I have reviewed all pertinent imaging results/findings within the past 24 hours.      Assessment/Plan:      * Hemoptysis  Small-volume and likely precipitated by anticoagulation.  Initially held AC, but will restart and continue to monitor.     -CT chest showing small effusion on R with moderate-sized pocket of fluid trapped in interlobar fissure, but likely not easily amenable to drainage without significant risk of traumatic injury to lung from procedure.  -Pulmonology consulted  appreciate recs.   -Given her lack of symptomatology and clinical disposition, this presentation is not likely hemoptysis. Although, if it is, this could possibly an exacerbation of underlying bronchiectasis; however, this would be an atypical presentation given the absence of other respiratory symptoms. Imaging findings likely associated with underlying HFpEF, would recommend follow up with CXR with PCP for reevaluation of those image findings.    - Recommend repeat CXR in 6 weeks with PCP for interstitial edema and R lung finding. If she develops  worsening respiratory symptoms, follow up with pulmonology clinic would be welcomed. Although unlikely  given symptomatology, OK with short course of amoxicillin for possible bronchiectasis exacerbation.    (HFpEF) heart failure with preserved ejection fraction  -Pt euvolemic on exam  -  -CXR reveals some degree of interstitial edema or interstitial infiltrates and mediastinal adenopathy accounting for these changes not excluded.  Clinical correlation. See full report  -last echo 02/2021 revealed The left ventricle is normal in size with concentric remodeling and normal systolic function. The estimated ejection fraction is 65%. Indeterminate left ventricular diastolic function.  -Repeating echo  -gently diuresing w/ lasix 10 mg IV x1  -strict I&Os, daily wts,  -low sodium diet    Pleural effusion  -Likely too small/too risky to sample as mentioned above.  -Pulmonology evaluation, recs as above    Current use of long term anticoagulation  Holding eliquis for hemoptysis.      Paroxysmal atrial fibrillation  Current use of long term AC  -On eliquis BID; initially held d/t suspicion of hemoptysis. Resumed 1/6/22  -Currently rate-controlled on Toprol xl 25 qd      BASILIO (obstructive sleep apnea)  CPAP QHS.        VTE Risk Mitigation (From admission, onward)         Ordered     Reason for No Pharmacological VTE Prophylaxis  Once        Question:  Reasons:  Answer:   Already adequately anticoagulated on oral Anticoagulants    01/05/22 1943     IP VTE HIGH RISK PATIENT  Once         01/05/22 1943     Place sequential compression device  Until discontinued         01/05/22 1943                Discharge Planning   BHARAT: 1/7/2022     Code Status: Full Code   Is the patient medically ready for discharge?: No    Reason for patient still in hospital (select all that apply): Patient trending condition, Laboratory test and Treatment  Discharge Plan A: Home with family                  Roberto Head PA-C  Department of Hospital Medicine   [facility] - Tele[facility] ([place] [address])

## 2022-01-06 NOTE — ASSESSMENT & PLAN NOTE
89F presented to ED after spitting up blood for a couple weeks.  She experiences a chronic cough when laying which is not productive or worsening. Cough never associated with the blood. No hematemesis. She denies any chest pain or shortness of breath.  No fevers or chills. Hx of breast cancer, mets to the lungs s/p surgical resection vs biopsy? and chemotherapy and radiation per patient in the 1980s.  She is on Eliquis for atrial fibrillation.     Given her lack of symptomatology and clinical disposition, this presentation is not likely hemoptysis. Although, if it is, this could possibly an exacerbation of underlying bronchiectasis; however, this would be an atypical presentation given the absence of other respiratory symptoms. Patient at this time is unable to accurately quantify blood produced during episodes, and she has been not exhibited the finding while in the hospital. She states that she wipes her mouth and finds it on napkins rather than coughing up the blood. She does have some wheezing on exam; however, she reports that this does not effect her quality of life. Given her disposition and Afib, would not start albuterol inhaler at this time.  Imaging findings likely associated with underlying HFpEF, would recommend follow up with CXR with PCP for reevaluation of those image findings.     - Recommend repeat CXR in 6 weeks with PCP for interstitial edema and R lung finding.   - If she develops worsening respiratory symptoms, follow up with pulmonology clinic would be welcomed  - Although unlikely given symptomatology, OK with short course of amoxicillin for possible bronchiectasis exacerbation.

## 2022-01-06 NOTE — SUBJECTIVE & OBJECTIVE
Past Medical History:   Diagnosis Date    Allergy     Basal cell carcinoma 2003    Dr. 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 SAQIB Nielsen/ right side of nose and forhead also.    Stroke 2/2/2021       Past Surgical History:   Procedure Laterality Date    BREAST SURGERY Right 1990    x2- Mastectomy    BREAST SURGERY Left     Mastectomy    CATARACT EXTRACTION Bilateral 2012    Dr. Crocker    EYE SURGERY      MASTECTOMY, RADICAL Left 1988       Review of patient's allergies indicates:  No Known Allergies    No current facility-administered medications on file prior to encounter.     Current Outpatient Medications on File Prior to Encounter   Medication Sig    apixaban (ELIQUIS) 5 mg Tab Take 1 tablet (5 mg total) by mouth 2 (two) times daily.    atorvastatin (LIPITOR) 40 MG tablet Take 1 tablet (40 mg total) by mouth once daily.    metoprolol succinate (TOPROL-XL) 25 MG 24 hr tablet Take 1 tablet (25 mg total) by mouth once daily.    benzonatate (TESSALON) 200 MG capsule Take 1 capsule (200 mg total) by mouth 3 (three) times daily as needed for Cough. (Patient not taking: Reported on 9/29/2021)    salt moisturizing solution no1 (OCEAN ULTRA SALINE MIST) Mist 2 Pump by Nasal route 2 (two) times daily as needed.    VIT A/VIT C/VIT E/ZINC/COPPER (PRESERVISION AREDS ORAL) Take by mouth once daily.      Family History     Problem Relation (Age of Onset)    Asthma Cousin    Cancer Maternal Aunt, Cousin    Heart disease Mother, Father, Brother, Sister    Heart failure Mother, Father, Brother    Hypertension Mother, Brother, Sister, Sister, Maternal Uncle, Paternal Aunt, Paternal Uncle        Tobacco Use    Smoking status: Never  Smoker    Smokeless tobacco: Never Used   Substance and Sexual Activity    Alcohol use: Yes     Alcohol/week: 0.0 standard drinks     Comment: social    Drug use: No    Sexual activity: Not Currently     Review of Systems   Constitutional: Negative for activity change, appetite change, chills, fatigue and fever.   HENT: Positive for congestion. Negative for nosebleeds and postnasal drip.    Eyes: Negative for photophobia and visual disturbance.   Respiratory: Positive for cough. Negative for shortness of breath.    Cardiovascular: Negative for chest pain and leg swelling.   Gastrointestinal: Negative for abdominal pain, nausea and vomiting.   Endocrine: Negative for polydipsia and polyuria.   Genitourinary: Negative for dysuria and hematuria.   Musculoskeletal: Negative for arthralgias and myalgias.   Skin: Negative for color change and rash.   Neurological: Negative for dizziness, syncope and headaches.   Hematological: Bruises/bleeds easily.     Objective:     Vital Signs (Most Recent):  Temp: 98.6 °F (37 °C) (01/06/22 0524)  Pulse: 73 (01/06/22 0524)  Resp: 18 (01/06/22 0524)  BP: (!) 141/69 (01/06/22 0524)  SpO2: 98 % (01/06/22 0524) Vital Signs (24h Range):  Temp:  [97 °F (36.1 °C)-98.7 °F (37.1 °C)] 98.6 °F (37 °C)  Pulse:  [] 73  Resp:  [14-20] 18  SpO2:  [92 %-98 %] 98 %  BP: (107-150)/(57-87) 141/69     Weight: 61.2 kg (134 lb 14.7 oz)  Body mass index is 28.2 kg/m².    Physical Exam  Vitals and nursing note reviewed.   Constitutional:       General: She is not in acute distress.     Appearance: Normal appearance. She is normal weight.   HENT:      Head: Normocephalic and atraumatic.      Nose: Nose normal.      Mouth/Throat:      Mouth: Mucous membranes are moist.      Pharynx: Oropharynx is clear.   Eyes:      General: No scleral icterus.     Extraocular Movements: Extraocular movements intact.      Conjunctiva/sclera: Conjunctivae normal.      Pupils: Pupils are equal, round, and reactive to  light.   Cardiovascular:      Rate and Rhythm: Normal rate. Rhythm irregular.      Pulses: Normal pulses.      Heart sounds: Normal heart sounds. No murmur heard.  No gallop.    Pulmonary:      Effort: Pulmonary effort is normal.      Breath sounds: Rales (right lung base) present.   Abdominal:      General: Bowel sounds are normal. There is no distension.      Palpations: Abdomen is soft.      Tenderness: There is no abdominal tenderness. There is no guarding.   Musculoskeletal:         General: No tenderness, deformity or signs of injury. Normal range of motion.      Cervical back: Normal range of motion and neck supple. No rigidity.      Right lower leg: No edema.      Left lower leg: No edema.   Lymphadenopathy:      Cervical: No cervical adenopathy.   Skin:     General: Skin is warm and dry.      Findings: No erythema or rash.   Neurological:      Mental Status: She is alert and oriented to person, place, and time.      Cranial Nerves: No cranial nerve deficit.           CRANIAL NERVES     CN III, IV, VI   Pupils are equal, round, and reactive to light.       Significant Labs:   All pertinent labs within the past 24 hours have been reviewed.  CBC:   Recent Labs   Lab 01/05/22  1252 01/06/22  0335   WBC 5.94 5.89   HGB 12.7 11.6*   HCT 40.9 35.6*    174     CMP:   Recent Labs   Lab 01/05/22  1252 01/06/22  0335    137   K 3.8 3.5    104   CO2 25 24    86   BUN 15 13   CREATININE 0.6 0.5   CALCIUM 9.6 9.3   PROT 8.1  --    ALBUMIN 4.1  --    BILITOT 0.9  --    ALKPHOS 67  --    AST 18  --    ALT 14  --    ANIONGAP 9 9   EGFRNONAA >60.0 >60.0       Significant Imaging: I have reviewed all pertinent imaging results/findings within the past 24 hours.

## 2022-01-06 NOTE — SUBJECTIVE & OBJECTIVE
Interval History: NAEON. Pt seen and evaluated at bedside. Pt does not reports no new hemoptysis, but does endorses chronic cough. Not grossly overloaded on exam, however, with elevated BNP and review of previous echo suggestive of HF and CXR w/ interstitial edema, Pt may benefit from gentle diuresis. Repeating echo. Pulm evaluated and suggests presentation less likely hemoptysis d/t bronchiectasis.    Review of Systems   Constitutional: Negative for activity change, appetite change, chills, fatigue and fever.   HENT: Positive for congestion. Negative for nosebleeds and postnasal drip.    Eyes: Negative for photophobia and visual disturbance.   Respiratory: Positive for cough (chronic, non productive) and wheezing. Negative for shortness of breath.    Cardiovascular: Negative for chest pain and leg swelling.   Gastrointestinal: Negative for abdominal pain and vomiting.   Endocrine: Negative for polydipsia and polyuria.   Genitourinary: Negative for dysuria and hematuria.   Musculoskeletal: Negative for arthralgias and myalgias.   Skin: Negative for color change and rash.   Neurological: Negative for dizziness and headaches.   Hematological: Bruises/bleeds easily.     Objective:     Vital Signs (Most Recent):  Temp: 97.5 °F (36.4 °C) (01/06/22 1136)  Pulse: 86 (01/06/22 1136)  Resp: 18 (01/06/22 1136)  BP: (!) 155/64 (01/06/22 1136)  SpO2: (!) 92 % (01/06/22 1136) Vital Signs (24h Range):  Temp:  [97 °F (36.1 °C)-98.6 °F (37 °C)] 97.5 °F (36.4 °C)  Pulse:  [72-92] 86  Resp:  [16-20] 18  SpO2:  [92 %-98 %] 92 %  BP: (107-155)/(57-73) 155/64     Weight: 61.2 kg (134 lb 14.7 oz)  Body mass index is 28.2 kg/m².    Intake/Output Summary (Last 24 hours) at 1/6/2022 1259  Last data filed at 1/5/2022 2000  Gross per 24 hour   Intake 300 ml   Output --   Net 300 ml      Physical Exam  Vitals and nursing note reviewed.   Constitutional:       General: She is not in acute distress.     Appearance: She is well-developed. She is  not diaphoretic.   HENT:      Head: Normocephalic and atraumatic.      Right Ear: External ear normal.      Left Ear: External ear normal.      Nose: Nose normal. No congestion.      Mouth/Throat:      Pharynx: Oropharynx is clear.   Eyes:      General: No scleral icterus.     Extraocular Movements: Extraocular movements intact.   Cardiovascular:      Rate and Rhythm: Normal rate and regular rhythm.      Pulses: Normal pulses.      Heart sounds: Normal heart sounds. No murmur heard.      Pulmonary:      Effort: Pulmonary effort is normal. No respiratory distress.      Breath sounds: Wheezing present. No rales.   Abdominal:      General: Bowel sounds are normal. There is no distension.      Palpations: Abdomen is soft.      Tenderness: There is no abdominal tenderness. There is no guarding or rebound.   Musculoskeletal:      Cervical back: Normal range of motion.      Right lower leg: No edema.      Left lower leg: No edema.   Skin:     General: Skin is warm and dry.      Capillary Refill: Capillary refill takes less than 2 seconds.   Neurological:      General: No focal deficit present.      Mental Status: She is alert and oriented to person, place, and time. Mental status is at baseline.   Psychiatric:         Mood and Affect: Mood normal.         Behavior: Behavior normal.         Thought Content: Thought content normal.         Significant Labs: All pertinent labs within the past 24 hours have been reviewed.    Significant Imaging: I have reviewed all pertinent imaging results/findings within the past 24 hours.

## 2022-01-06 NOTE — ASSESSMENT & PLAN NOTE
Small-volume and likely precipitated by anticoagulation.  Holding AC and will monitor.  Pulmonology consulted but anticipate conservative monitoring.  CT chest showing small effusion on R with moderate-sized pocket of fluid trapped in interlobar fissure, but likely not easily amenable to drainage without significant risk of traumatic injury to lung from procedure.

## 2022-01-06 NOTE — HPI
89F presented to ED after spitting up blood for a couple weeks.  She didn't come get evaluated earlier because she didn't want to risk being hospitalized over Serg or New Year's, and she'd only been bringing up small amounts of blood occasionally.  She denies any chest pain or shortness of breath.  No fevers or chills.  She has a remote history of breast cancer with spread to the lungs s/p surgical resection and chemotherapy in the 1980s.  She is on Eliquis for atrial fibrillation.  She has been blowing her nose thinking that the blood might be coming from there but there has not been any blood yielded that way.

## 2022-01-06 NOTE — NURSING
Patient is awake and OX3.  She hasn't coughed up any blood or had any complaints of pain.  She is up in the chair with her daughter at the bedside.

## 2022-01-06 NOTE — HPI
89F presented to ED after spitting up blood for a couple weeks.  She didn't come get evaluated earlier because she didn't want to risk being hospitalized over Serg or New Year's, and she'd only been bringing up small amounts of blood occasionally.  She experiences a chronic cough when laying which is not productive or worsening. She states that she never coughs with the blood or vomits with blood. She wipes her mouth and a small amount of blood is present. She is unable to quantify further. She denies any chest pain or shortness of breath.  No fevers or chills.  She has a remote history of breast cancer with spread to the lungs s/p surgical resection vs biopsy? and chemotherapy and radiation per patient in the 1980s.  She is on Eliquis for atrial fibrillation.

## 2022-01-07 VITALS
RESPIRATION RATE: 16 BRPM | BODY MASS INDEX: 28.13 KG/M2 | WEIGHT: 134 LBS | SYSTOLIC BLOOD PRESSURE: 149 MMHG | TEMPERATURE: 98 F | HEIGHT: 58 IN | DIASTOLIC BLOOD PRESSURE: 65 MMHG | HEART RATE: 78 BPM | OXYGEN SATURATION: 93 %

## 2022-01-07 LAB
ANION GAP SERPL CALC-SCNC: 10 MMOL/L (ref 8–16)
BASOPHILS # BLD AUTO: 0.04 K/UL (ref 0–0.2)
BASOPHILS NFR BLD: 0.9 % (ref 0–1.9)
BUN SERPL-MCNC: 16 MG/DL (ref 10–30)
CALCIUM SERPL-MCNC: 9.2 MG/DL (ref 8.7–10.5)
CHLORIDE SERPL-SCNC: 106 MMOL/L (ref 95–110)
CO2 SERPL-SCNC: 27 MMOL/L (ref 23–29)
CREAT SERPL-MCNC: 0.6 MG/DL (ref 0.5–1.4)
DIFFERENTIAL METHOD: ABNORMAL
EOSINOPHIL # BLD AUTO: 0.1 K/UL (ref 0–0.5)
EOSINOPHIL NFR BLD: 2.5 % (ref 0–8)
ERYTHROCYTE [DISTWIDTH] IN BLOOD BY AUTOMATED COUNT: 14 % (ref 11.5–14.5)
EST. GFR  (AFRICAN AMERICAN): >60 ML/MIN/1.73 M^2
EST. GFR  (NON AFRICAN AMERICAN): >60 ML/MIN/1.73 M^2
GLUCOSE SERPL-MCNC: 111 MG/DL (ref 70–110)
HCT VFR BLD AUTO: 37 % (ref 37–48.5)
HGB BLD-MCNC: 11.8 G/DL (ref 12–16)
IMM GRANULOCYTES # BLD AUTO: 0.03 K/UL (ref 0–0.04)
IMM GRANULOCYTES NFR BLD AUTO: 0.7 % (ref 0–0.5)
LYMPHOCYTES # BLD AUTO: 1.3 K/UL (ref 1–4.8)
LYMPHOCYTES NFR BLD: 29.4 % (ref 18–48)
MAGNESIUM SERPL-MCNC: 2 MG/DL (ref 1.6–2.6)
MCH RBC QN AUTO: 27.4 PG (ref 27–31)
MCHC RBC AUTO-ENTMCNC: 31.9 G/DL (ref 32–36)
MCV RBC AUTO: 86 FL (ref 82–98)
MONOCYTES # BLD AUTO: 0.6 K/UL (ref 0.3–1)
MONOCYTES NFR BLD: 13.3 % (ref 4–15)
NEUTROPHILS # BLD AUTO: 2.3 K/UL (ref 1.8–7.7)
NEUTROPHILS NFR BLD: 53.2 % (ref 38–73)
NRBC BLD-RTO: 0 /100 WBC
PHOSPHATE SERPL-MCNC: 3.9 MG/DL (ref 2.7–4.5)
PLATELET # BLD AUTO: 189 K/UL (ref 150–450)
PMV BLD AUTO: 10.2 FL (ref 9.2–12.9)
POTASSIUM SERPL-SCNC: 3.5 MMOL/L (ref 3.5–5.1)
RBC # BLD AUTO: 4.3 M/UL (ref 4–5.4)
SODIUM SERPL-SCNC: 143 MMOL/L (ref 136–145)
WBC # BLD AUTO: 4.36 K/UL (ref 3.9–12.7)

## 2022-01-07 PROCEDURE — 84100 ASSAY OF PHOSPHORUS: CPT | Performed by: HOSPITALIST

## 2022-01-07 PROCEDURE — 80048 BASIC METABOLIC PNL TOTAL CA: CPT | Performed by: HOSPITALIST

## 2022-01-07 PROCEDURE — 99217 PR OBSERVATION CARE DISCHARGE: ICD-10-PCS | Mod: ,,, | Performed by: PHYSICIAN ASSISTANT

## 2022-01-07 PROCEDURE — 83735 ASSAY OF MAGNESIUM: CPT | Performed by: HOSPITALIST

## 2022-01-07 PROCEDURE — 36415 COLL VENOUS BLD VENIPUNCTURE: CPT | Performed by: HOSPITALIST

## 2022-01-07 PROCEDURE — G0378 HOSPITAL OBSERVATION PER HR: HCPCS

## 2022-01-07 PROCEDURE — 85025 COMPLETE CBC W/AUTO DIFF WBC: CPT | Performed by: HOSPITALIST

## 2022-01-07 PROCEDURE — 25000003 PHARM REV CODE 250: Performed by: PHYSICIAN ASSISTANT

## 2022-01-07 PROCEDURE — 25000003 PHARM REV CODE 250: Performed by: HOSPITALIST

## 2022-01-07 PROCEDURE — 99217 PR OBSERVATION CARE DISCHARGE: CPT | Mod: ,,, | Performed by: PHYSICIAN ASSISTANT

## 2022-01-07 RX ADMIN — ATORVASTATIN CALCIUM 40 MG: 40 TABLET, FILM COATED ORAL at 08:01

## 2022-01-07 RX ADMIN — METOPROLOL SUCCINATE 25 MG: 25 TABLET, EXTENDED RELEASE ORAL at 08:01

## 2022-01-07 RX ADMIN — APIXABAN 5 MG: 5 TABLET, FILM COATED ORAL at 08:01

## 2022-01-07 NOTE — PLAN OF CARE
[facility] ([facility]  Discharge Final Note    Primary Care Provider: Grover Bardales MD    Expected Discharge Date: 1/7/2022    Patient discharged to home via personal transportation.     Patient's bedside nurse and patient notified of the above.      Final Discharge Note (most recent)       Final Note - 01/07/22 1506          Final Note    Assessment Type Final Discharge Note (P)      Anticipated Discharge Disposition Home or Self Care (P)         Post-Acute Status    Post-Acute Authorization Other (P)      Other Status No Post-Acute Service Needs (P)                      Important Message from Medicare                  Future Appointments   Date Time Provider Department Center   1/13/2022  9:00 AM Rosemarie Espinoza NP [facility]   2/8/2022 10:30 AM Grover Bardales MD [facility] IM [facility] scheduled post-discharge follow-up appointment and information added to AVS.     Floridalma Abbott LMSW  [facility]  Ext. [phone]

## 2022-01-07 NOTE — NURSING
Patient was awake and oriented X2.  Her niece is at the bedside with her.  Her blood pressure was high this morning.  Meds were given and it was with in normal range when I checked in an hour.  She she is ready for discharge.

## 2022-01-07 NOTE — NURSING
Pt alert and oriented on RA with no c/o.  Niece at bedside through night for assistance and safety precautions.  Comfortable with no distress at this time.  Will continue to monitor and POC.

## 2022-01-11 ENCOUNTER — TELEPHONE (OUTPATIENT)
Dept: INTERNAL MEDICINE | Facility: CLINIC | Age: 87
End: 2022-01-11
Payer: MEDICARE

## 2022-01-11 NOTE — DISCHARGE SUMMARY
[facility] [address]  [facility] Medicine  Discharge Summary      Patient Name: Samantha Balderrama  MRN: [id number]  Patient Class: OP- Observation  Admission Date: 1/5/2022  Hospital Length of Stay: 0 days  Discharge Date and Time: 1/7/2022 11:58 AM  Attending Physician: Al Velarde MD  Discharging Provider: Roberto Head PA-C  Primary Care Provider: Grover Bardales MD      HPI:   89F presented to ED after spitting up blood for a couple weeks.  She didn't come get evaluated earlier because she didn't want to risk being hospitalized over Serg or New Year's, and she'd only been bringing up small amounts of blood occasionally.  She denies any chest pain or shortness of breath.  No fevers or chills.  She has a remote history of breast cancer with spread to the lungs s/p surgical resection and chemotherapy in the 1980s.  She is on Eliquis for atrial fibrillation.  She has been blowing her nose thinking that the blood might be coming from there but there has not been any blood yielded that way.      * No surgery found *      Hospital Course:   Pt placed in observation for suspicious presentation for hemoptysis. Held Pt eliquis for suspicious hemoptysis. CT revealed no evidence of pulmonary thromboembolic disease to the subsegmental branches with excellent examination quality. New, small, loculated right pleural effusion with adjacent to right upper lung zone scarring. New mild fibrotic changes within the lingula, right middle lobe, and right apex.  Favor infectious/inflammatory etiology. Mid-distal esophageal wall thickening proximal to a small hiatal hernia; consider reflux esophagitis. CXR suggestive of interstitial edema or interstitial infiltrates. See full radiology reports.   Pulmonology consulted and assessed given her lack of symptomatology and clinical disposition, this presentation is not likely hemoptysis. Imaging findings likely associated with underlying HFpEF.   BNP  elevated. Pt not grossly overloaded on exam. TTE showed EF 65%, Indeterminate left ventricular diastolic function.   Pt restarted on home eliquis prior to discharge, no evidence of hemoptysis. Pt given PCP appointment in 6 weeks for repeat CXR per pulm recs. Pt educated on hospital course and plan, verbally agrees and understands. All questions answered.        Goals of Care Treatment Preferences:  Code Status: Full Code      Consults:   Consults (From admission, onward)        Status Ordering Provider     Inpatient consult to Pulmonology  Once        Provider:  (Not yet assigned)    Completed LISA SILVER          * Hemoptysis  Small-volume and likely precipitated by anticoagulation.  Initially held AC, but will restart and continue to monitor.     -CT chest showing small effusion on R with moderate-sized pocket of fluid trapped in interlobar fissure, but likely not easily amenable to drainage without significant risk of traumatic injury to lung from procedure.  -Pulmonology consulted appreciate recs.   -Given her lack of symptomatology and clinical disposition, this presentation is not likely hemoptysis. Although, if it is, this could possibly an exacerbation of underlying bronchiectasis; however, this would be an atypical presentation given the absence of other respiratory symptoms. Imaging findings likely associated with underlying HFpEF, would recommend follow up with CXR with PCP for reevaluation of those image findings.    - Recommend repeat CXR in 6 weeks with PCP for interstitial edema and R lung finding. If she develops  worsening respiratory symptoms, follow up with pulmonology clinic would be welcomed. Although unlikely  given symptomatology, OK with short course of amoxicillin for possible bronchiectasis exacerbation.    (HFpEF) heart failure with preserved ejection fraction  -Pt euvolemic on exam  -  -CXR reveals some degree of interstitial edema or interstitial infiltrates and  mediastinal adenopathy accounting for these changes not excluded.  Clinical correlation. See full report  -last echo 02/2021 revealed The left ventricle is normal in size with concentric remodeling and normal systolic function. The estimated ejection fraction is 65%. Indeterminate left ventricular diastolic function.  -Repeating echo  -gently diuresing w/ lasix 10 mg IV x1  -strict I&Os, daily wts,  -low sodium diet      Final Active Diagnoses:    Diagnosis Date Noted POA    PRINCIPAL PROBLEM:  Hemoptysis [R04.2] 01/05/2022 Yes    (HFpEF) heart failure with preserved ejection fraction [I50.30] 01/06/2022 Yes    Pleural effusion [J90] 01/05/2022 Yes    Current use of long term anticoagulation [Z79.01] 09/29/2021 Not Applicable    Paroxysmal atrial fibrillation [I48.0] 06/15/2021 Yes    BASILIO (obstructive sleep apnea) [G47.33] 01/16/2015 Yes      Problems Resolved During this Admission:       Discharged Condition: good    Disposition: Home or Self Care    Follow Up:    Patient Instructions:      Notify your health care provider if you experience any of the following:  temperature >100.4     Notify your health care provider if you experience any of the following:  severe uncontrolled pain     Notify your health care provider if you experience any of the following:  persistent dizziness, light-headedness, or visual disturbances     Notify your health care provider if you experience any of the following:  increased confusion or weakness     Notify your health care provider if you experience any of the following:  difficulty breathing or increased cough     Activity as tolerated       Significant Diagnostic Studies: Labs: All labs within the past 24 hours have been reviewed    Pending Diagnostic Studies:     None         Medications:  Reconciled Home Medications:      Medication List      CONTINUE taking these medications    apixaban 5 mg Tab  Commonly known as: ELIQUIS  Take 1 tablet (5 mg total) by mouth 2 (two)  times daily.     atorvastatin 40 MG tablet  Commonly known as: LIPITOR  Take 1 tablet (40 mg total) by mouth once daily.     benzonatate 200 MG capsule  Commonly known as: TESSALON  Take 1 capsule (200 mg total) by mouth 3 (three) times daily as needed for Cough.     metoprolol succinate 25 MG 24 hr tablet  Commonly known as: TOPROL-XL  Take 1 tablet (25 mg total) by mouth once daily.     PRESERVISION AREDS ORAL  Take by mouth once daily.     salt moisturizing solution no1 Mist  Commonly known as: OCEAN ULTRA SALINE MIST  2 Pump by Nasal route 2 (two) times daily as needed.            Indwelling Lines/Drains at time of discharge:   Lines/Drains/Airways     None                 Time spent on the discharge of patient: 36 minutes         Roberto Head PA-C  Department of Hospital Medicine  [facility] - Telemetry Stepdown ([place])

## 2022-01-24 NOTE — PROGRESS NOTES
Subjective:      Chief Complaint: Hospital Follow Up (Loculated Pleural effusion) and Medication Refill (Tessalon perles)    HPI   Ms. Samantha Balderrama is a 89-year-old with history of right MCA embolic CVA, a number of ophthalmological diagnoses, history of both squamous cell carcinoma and basal cell carcinoma, paroxysmal AFib, hyperlipidemia, aortic atherosclerosis, history of bilateral breast cancer with secondary lung cancer, hypovitaminosis D, fatty liver disease, diffuse osteoarthritis in setting of osteoporosis, seasonal allergies, and obstructive sleep apnea presenting for hospital follow-up:    Hospital follow-up:  - [facility] stay 1/22 for hemoptysis (scant/self-limited)  - status post radiation for breast cancer in setting of active Eliquis use for AFib  -reassuring CT chest (no PE/pneumonia) that was notable for loculated right pleural effusion; infectious/inflammatory etiologies were favored  - pulmonology was consulted and heart failure diagnosis in setting of underlying HFpEF, fluid overload, and elevated BNP was considered; reassuring TTE  - discharge for 6 week PCP appointment (appointment set by discharging team without informing Pt so she is hear early) with recommendation to repeat chest x-ray in 6 weeks  - still reporting nonproductive cough with occasional very scant blood-tinged mucus, but denying any associated constitutional symptoms, unintentional weight loss, smoking history, or significant family history of lung cancer.  - not on any medication with characteristic cough side effect profile  - has not been using her CPAP mask for formally diagnosed obstructive sleep apnea since was recalled (several months prior to recent hospitalization)    Review of Systems   Constitutional: Negative for appetite change, chills and fever.   HENT: Negative.    Respiratory: Positive for cough. Negative for chest tightness and shortness of breath.    Cardiovascular: Negative for chest pain, palpitations and leg  swelling.   Gastrointestinal: Negative for abdominal distention, abdominal pain, blood in stool, constipation, diarrhea, nausea and vomiting.   Endocrine: Negative.    Genitourinary: Negative for difficulty urinating, dysuria, frequency and hematuria.   Musculoskeletal: Negative.    Integumentary:  Negative.   Neurological: Negative.    Psychiatric/Behavioral: Negative.          Objective:      Vitals:    01/25/22 1116   BP: 102/74   Pulse: 96   Resp: 12   Temp: 98 °F (36.7 °C)      Physical Exam  Vitals reviewed.   Constitutional:       General: She is not in acute distress.     Appearance: Normal appearance.   HENT:      Head: Normocephalic and atraumatic.      Comments: Facial features are symmetric      Nose: Nose normal. No congestion or rhinorrhea.      Mouth/Throat:      Mouth: Mucous membranes are moist.      Pharynx: Oropharynx is clear. No oropharyngeal exudate or posterior oropharyngeal erythema.   Eyes:      General: No scleral icterus.     Extraocular Movements: Extraocular movements intact.      Conjunctiva/sclera: Conjunctivae normal.   Cardiovascular:      Rate and Rhythm: Normal rate and regular rhythm.      Pulses: Normal pulses.      Heart sounds: Normal heart sounds.   Pulmonary:      Effort: Pulmonary effort is normal. No respiratory distress.      Breath sounds: Normal breath sounds.   Musculoskeletal:         General: No deformity or signs of injury. Normal range of motion.      Cervical back: Normal range of motion.      Comments: Gait normal    Skin:     General: Skin is warm and dry.      Findings: No rash.   Neurological:      General: No focal deficit present.      Mental Status: She is alert and oriented to person, place, and time. Mental status is at baseline.   Psychiatric:         Mood and Affect: Mood normal.         Behavior: Behavior normal.         Thought Content: Thought content normal.       Current Outpatient Medications on File Prior to Visit   Medication Sig Dispense Refill     apixaban (ELIQUIS) 5 mg Tab Take 1 tablet (5 mg total) by mouth 2 (two) times daily. 60 tablet 11    atorvastatin (LIPITOR) 40 MG tablet Take 1 tablet (40 mg total) by mouth once daily. 90 tablet 3    metoprolol succinate (TOPROL-XL) 25 MG 24 hr tablet Take 1 tablet (25 mg total) by mouth once daily. 30 tablet 11    salt moisturizing solution no1 (OCEAN ULTRA SALINE MIST) Mist 2 Pump by Nasal route 2 (two) times daily as needed. 90 mL 0    VIT A/VIT C/VIT E/ZINC/COPPER (PRESERVISION AREDS ORAL) Take by mouth once daily.       benzonatate (TESSALON) 200 MG capsule Take 1 capsule (200 mg total) by mouth 3 (three) times daily as needed for Cough. (Patient not taking: No sig reported) 30 capsule 1     No current facility-administered medications on file prior to visit.         Assessment:       1. Cough with hemoptysis    2. Loculated pleural effusion    3. BASILIO (obstructive sleep apnea)        Plan:       Cough with hemoptysis   - with differential diagnosis includes exacerbation of sinus allergies, adult onset asthma, GERD with atypical presentation, cough resultant discontinuation of CPAP use, or nonspecific pleural inflammatory condition with secondary cough/pleural effusion   - Suresh Sung called in for short interval symptomatic relief pending reassessment in 2 weeks as planned    Loculated pleural effusion  -     X-Ray Chest PA And Lateral; Future; Expected date: 02/07/2022  -     Ambulatory referral/consult to Pulmonology; Future; Expected date: 02/01/2022   - 6 week chest x-ray ordered per recommendations of discharging team   - will also facilitate establishment with pulmonology for assistance interpreting/further evaluating repeat imaging and further evaluation/treatment of chronic cough if the loculated pleural effusion turns out to be a red herring; recommendations/interventions appreciated    BASILIO (obstructive sleep apnea)  -     Ambulatory referral/consult to Sleep Disorders; Future; Expected  date: 02/01/2022   - I have strong underlying suspicion for currently untreated obstructive sleep apnea as the underlying  of cough; will refer to the sleep disorders clinic for assistance acquiring new device and titrating appropriate settings; recommendations/interventions appreciated.    Other orders  -     benzonatate (TESSALON) 200 MG capsule; Take 1 capsule (200 mg total) by mouth 3 (three) times daily as needed for Cough.  Dispense: 60 capsule; Refill: 0

## 2022-01-25 ENCOUNTER — OFFICE VISIT (OUTPATIENT)
Dept: INTERNAL MEDICINE | Facility: CLINIC | Age: 87
End: 2022-01-25
Payer: MEDICARE

## 2022-01-25 VITALS
SYSTOLIC BLOOD PRESSURE: 102 MMHG | RESPIRATION RATE: 12 BRPM | TEMPERATURE: 98 F | HEIGHT: 58 IN | DIASTOLIC BLOOD PRESSURE: 74 MMHG | HEART RATE: 96 BPM | OXYGEN SATURATION: 97 % | WEIGHT: 138.69 LBS | BODY MASS INDEX: 29.11 KG/M2

## 2022-01-25 DIAGNOSIS — G47.33 OSA (OBSTRUCTIVE SLEEP APNEA): ICD-10-CM

## 2022-01-25 DIAGNOSIS — R04.2 COUGH WITH HEMOPTYSIS: Primary | ICD-10-CM

## 2022-01-25 DIAGNOSIS — J90 LOCULATED PLEURAL EFFUSION: ICD-10-CM

## 2022-01-25 PROCEDURE — 1157F PR ADVANCE CARE PLAN OR EQUIV PRESENT IN MEDICAL RECORD: ICD-10-PCS | Mod: CPTII,S$GLB,, | Performed by: STUDENT IN AN ORGANIZED HEALTH CARE EDUCATION/TRAINING PROGRAM

## 2022-01-25 PROCEDURE — 1126F PR PAIN SEVERITY QUANTIFIED, NO PAIN PRESENT: ICD-10-PCS | Mod: CPTII,S$GLB,, | Performed by: STUDENT IN AN ORGANIZED HEALTH CARE EDUCATION/TRAINING PROGRAM

## 2022-01-25 PROCEDURE — 99499 RISK ADDL DX/OHS AUDIT: ICD-10-PCS | Mod: S$GLB,,, | Performed by: STUDENT IN AN ORGANIZED HEALTH CARE EDUCATION/TRAINING PROGRAM

## 2022-01-25 PROCEDURE — 1157F ADVNC CARE PLAN IN RCRD: CPT | Mod: CPTII,S$GLB,, | Performed by: STUDENT IN AN ORGANIZED HEALTH CARE EDUCATION/TRAINING PROGRAM

## 2022-01-25 PROCEDURE — 99499 UNLISTED E&M SERVICE: CPT | Mod: S$GLB,,, | Performed by: STUDENT IN AN ORGANIZED HEALTH CARE EDUCATION/TRAINING PROGRAM

## 2022-01-25 PROCEDURE — 1126F AMNT PAIN NOTED NONE PRSNT: CPT | Mod: CPTII,S$GLB,, | Performed by: STUDENT IN AN ORGANIZED HEALTH CARE EDUCATION/TRAINING PROGRAM

## 2022-01-25 PROCEDURE — 1159F PR MEDICATION LIST DOCUMENTED IN MEDICAL RECORD: ICD-10-PCS | Mod: CPTII,S$GLB,, | Performed by: STUDENT IN AN ORGANIZED HEALTH CARE EDUCATION/TRAINING PROGRAM

## 2022-01-25 PROCEDURE — 99214 OFFICE O/P EST MOD 30 MIN: CPT | Mod: S$GLB,,, | Performed by: STUDENT IN AN ORGANIZED HEALTH CARE EDUCATION/TRAINING PROGRAM

## 2022-01-25 PROCEDURE — 99214 PR OFFICE/OUTPT VISIT, EST, LEVL IV, 30-39 MIN: ICD-10-PCS | Mod: S$GLB,,, | Performed by: STUDENT IN AN ORGANIZED HEALTH CARE EDUCATION/TRAINING PROGRAM

## 2022-01-25 PROCEDURE — 99999 PR PBB SHADOW E&M-EST. PATIENT-LVL V: ICD-10-PCS | Mod: PBBFAC,,, | Performed by: STUDENT IN AN ORGANIZED HEALTH CARE EDUCATION/TRAINING PROGRAM

## 2022-01-25 PROCEDURE — 1100F PTFALLS ASSESS-DOCD GE2>/YR: CPT | Mod: CPTII,S$GLB,, | Performed by: STUDENT IN AN ORGANIZED HEALTH CARE EDUCATION/TRAINING PROGRAM

## 2022-01-25 PROCEDURE — 1159F MED LIST DOCD IN RCRD: CPT | Mod: CPTII,S$GLB,, | Performed by: STUDENT IN AN ORGANIZED HEALTH CARE EDUCATION/TRAINING PROGRAM

## 2022-01-25 PROCEDURE — 1100F PR PT FALLS ASSESS DOC 2+ FALLS/FALL W/INJURY/YR: ICD-10-PCS | Mod: CPTII,S$GLB,, | Performed by: STUDENT IN AN ORGANIZED HEALTH CARE EDUCATION/TRAINING PROGRAM

## 2022-01-25 PROCEDURE — 99999 PR PBB SHADOW E&M-EST. PATIENT-LVL V: CPT | Mod: PBBFAC,,, | Performed by: STUDENT IN AN ORGANIZED HEALTH CARE EDUCATION/TRAINING PROGRAM

## 2022-01-25 PROCEDURE — 3288F FALL RISK ASSESSMENT DOCD: CPT | Mod: CPTII,S$GLB,, | Performed by: STUDENT IN AN ORGANIZED HEALTH CARE EDUCATION/TRAINING PROGRAM

## 2022-01-25 PROCEDURE — 3288F PR FALLS RISK ASSESSMENT DOCUMENTED: ICD-10-PCS | Mod: CPTII,S$GLB,, | Performed by: STUDENT IN AN ORGANIZED HEALTH CARE EDUCATION/TRAINING PROGRAM

## 2022-01-25 RX ORDER — BENZONATATE 200 MG/1
200 CAPSULE ORAL 3 TIMES DAILY PRN
Qty: 60 CAPSULE | Refills: 0 | Status: SHIPPED | OUTPATIENT
Start: 2022-01-25 | End: 2022-02-04

## 2022-02-07 ENCOUNTER — HOSPITAL ENCOUNTER (OUTPATIENT)
Dept: RADIOLOGY | Facility: HOSPITAL | Age: 87
Discharge: HOME | End: 2022-02-07
Attending: STUDENT IN AN ORGANIZED HEALTH CARE EDUCATION/TRAINING PROGRAM
Payer: MEDICARE

## 2022-02-07 DIAGNOSIS — J90 LOCULATED PLEURAL EFFUSION: ICD-10-CM

## 2022-02-07 PROCEDURE — 71046 X-RAY EXAM CHEST 2 VIEWS: CPT | Mod: 26,,, | Performed by: RADIOLOGY

## 2022-02-07 PROCEDURE — 71046 X-RAY EXAM CHEST 2 VIEWS: CPT | Mod: TC,PO

## 2022-02-07 PROCEDURE — 71046 XR CHEST PA AND LATERAL: ICD-10-PCS | Mod: 26,,, | Performed by: RADIOLOGY

## 2022-02-07 NOTE — PROGRESS NOTES
Subjective:      Chief Complaint: Follow-up    HPI   Ms. Samantha Balderrama is a 89-year-old with history of right MCA embolic CVA, a number of ophthalmological diagnoses, history of both squamous cell carcinoma and basal cell carcinoma, paroxysmal AFib, hyperlipidemia, aortic atherosclerosis, history of bilateral breast cancer with secondary lung cancer, hypovitaminosis D, fatty liver disease, diffuse osteoarthritis in setting of osteoporosis, seasonal allergies, and obstructive sleep apnea presenting  for formal 6 week hospital follow-up (please see office note of 01/25/2022 for further details):    - Follow-up chest x-ray: stable  - will be established with pulmonology and Sleep Medicine at the end of this month  - Only residual symptom is a persistent dry cough with scant blood-tinged sputum    Review of Systems   Constitutional: Negative for appetite change, chills and fever.   HENT: Negative.    Respiratory: Negative for cough, chest tightness and shortness of breath.    Cardiovascular: Negative for chest pain, palpitations and leg swelling.   Gastrointestinal: Negative for abdominal distention, abdominal pain, blood in stool, constipation, diarrhea, nausea and vomiting.   Endocrine: Negative.    Genitourinary: Negative for difficulty urinating, dysuria, frequency and hematuria.   Musculoskeletal: Negative.    Integumentary:  Negative.   Neurological: Negative.    Psychiatric/Behavioral: Negative.          Objective:      Vitals:    02/08/22 1008   BP: 110/66   Pulse: 81   Resp: 17   Temp: 97.5 °F (36.4 °C)      Physical Exam  Vitals reviewed.   Constitutional:       General: She is not in acute distress.     Appearance: Normal appearance.   HENT:      Head: Normocephalic and atraumatic.      Comments: Facial features are symmetric      Nose: Nose normal. No congestion or rhinorrhea.      Mouth/Throat:      Mouth: Mucous membranes are moist.      Pharynx: Oropharynx is clear. No oropharyngeal exudate or posterior  oropharyngeal erythema.   Eyes:      General: No scleral icterus.     Extraocular Movements: Extraocular movements intact.      Conjunctiva/sclera: Conjunctivae normal.   Cardiovascular:      Rate and Rhythm: Normal rate and regular rhythm.      Pulses: Normal pulses.      Heart sounds: Normal heart sounds.   Pulmonary:      Effort: Pulmonary effort is normal. No respiratory distress.      Breath sounds: Normal breath sounds.   Musculoskeletal:         General: No deformity or signs of injury. Normal range of motion.      Cervical back: Normal range of motion.      Comments: Gait normal    Skin:     General: Skin is warm and dry.      Findings: No rash.   Neurological:      General: No focal deficit present.      Mental Status: She is alert and oriented to person, place, and time. Mental status is at baseline.   Psychiatric:         Mood and Affect: Mood normal.         Behavior: Behavior normal.         Thought Content: Thought content normal.       Current Outpatient Medications on File Prior to Visit   Medication Sig Dispense Refill    apixaban (ELIQUIS) 5 mg Tab Take 1 tablet (5 mg total) by mouth 2 (two) times daily. 60 tablet 11    atorvastatin (LIPITOR) 40 MG tablet Take 1 tablet (40 mg total) by mouth once daily. 90 tablet 3    dexamethasone (DECADRON) 10 mg/mL injection       metoprolol succinate (TOPROL-XL) 25 MG 24 hr tablet Take 1 tablet (25 mg total) by mouth once daily. 30 tablet 11    salt moisturizing solution no1 (OCEAN ULTRA SALINE MIST) Mist 2 Pump by Nasal route 2 (two) times daily as needed. 90 mL 0    tamoxifen (NOLVADEX) 10 MG Tab tamoxifen Take 1 time per day No date recorded tablet 1 time per day No route recorded No set duration recorded No set duration amount recorded active 10 mg      VIT A/VIT C/VIT E/ZINC/COPPER (PRESERVISION AREDS ORAL) Take by mouth once daily.       benzonatate (TESSALON) 200 MG capsule Take 1 capsule (200 mg total) by mouth 3 (three) times daily as needed  for Cough. (Patient not taking: No sig reported) 30 capsule 1     No current facility-administered medications on file prior to visit.         Assessment:       1. Pleural effusion    2. BASILIO (obstructive sleep apnea)    3. Seasonal allergies        Plan:       Pleural effusion  BASILIO (obstructive sleep apnea)  Seasonal allergies   - reassuring 6 week follow-up chest x-ray   - advocated keeping upcoming pulmonology and Sleep Medicine establishment appointments (recommendations/interventions appreciated) and utilizing over-the-counter Allegra to help abated any contributing seasonal allergies to chronic cough

## 2022-02-08 ENCOUNTER — OFFICE VISIT (OUTPATIENT)
Dept: INTERNAL MEDICINE | Facility: CLINIC | Age: 87
End: 2022-02-08
Payer: MEDICARE

## 2022-02-08 VITALS
SYSTOLIC BLOOD PRESSURE: 110 MMHG | HEART RATE: 81 BPM | DIASTOLIC BLOOD PRESSURE: 66 MMHG | RESPIRATION RATE: 17 BRPM | WEIGHT: 142.19 LBS | HEIGHT: 58 IN | BODY MASS INDEX: 29.85 KG/M2 | TEMPERATURE: 98 F | OXYGEN SATURATION: 96 %

## 2022-02-08 DIAGNOSIS — J90 PLEURAL EFFUSION: Primary | ICD-10-CM

## 2022-02-08 DIAGNOSIS — J30.2 SEASONAL ALLERGIES: ICD-10-CM

## 2022-02-08 DIAGNOSIS — G47.33 OSA (OBSTRUCTIVE SLEEP APNEA): ICD-10-CM

## 2022-02-08 PROCEDURE — 99999 PR PBB SHADOW E&M-EST. PATIENT-LVL III: ICD-10-PCS | Mod: PBBFAC,,, | Performed by: STUDENT IN AN ORGANIZED HEALTH CARE EDUCATION/TRAINING PROGRAM

## 2022-02-08 PROCEDURE — 1157F ADVNC CARE PLAN IN RCRD: CPT | Mod: CPTII,S$GLB,, | Performed by: STUDENT IN AN ORGANIZED HEALTH CARE EDUCATION/TRAINING PROGRAM

## 2022-02-08 PROCEDURE — 99213 OFFICE O/P EST LOW 20 MIN: CPT | Mod: S$GLB,,, | Performed by: STUDENT IN AN ORGANIZED HEALTH CARE EDUCATION/TRAINING PROGRAM

## 2022-02-08 PROCEDURE — 1159F PR MEDICATION LIST DOCUMENTED IN MEDICAL RECORD: ICD-10-PCS | Mod: CPTII,S$GLB,, | Performed by: STUDENT IN AN ORGANIZED HEALTH CARE EDUCATION/TRAINING PROGRAM

## 2022-02-08 PROCEDURE — 3288F PR FALLS RISK ASSESSMENT DOCUMENTED: ICD-10-PCS | Mod: CPTII,S$GLB,, | Performed by: STUDENT IN AN ORGANIZED HEALTH CARE EDUCATION/TRAINING PROGRAM

## 2022-02-08 PROCEDURE — 1157F PR ADVANCE CARE PLAN OR EQUIV PRESENT IN MEDICAL RECORD: ICD-10-PCS | Mod: CPTII,S$GLB,, | Performed by: STUDENT IN AN ORGANIZED HEALTH CARE EDUCATION/TRAINING PROGRAM

## 2022-02-08 PROCEDURE — 1101F PT FALLS ASSESS-DOCD LE1/YR: CPT | Mod: CPTII,S$GLB,, | Performed by: STUDENT IN AN ORGANIZED HEALTH CARE EDUCATION/TRAINING PROGRAM

## 2022-02-08 PROCEDURE — 1126F AMNT PAIN NOTED NONE PRSNT: CPT | Mod: CPTII,S$GLB,, | Performed by: STUDENT IN AN ORGANIZED HEALTH CARE EDUCATION/TRAINING PROGRAM

## 2022-02-08 PROCEDURE — 1126F PR PAIN SEVERITY QUANTIFIED, NO PAIN PRESENT: ICD-10-PCS | Mod: CPTII,S$GLB,, | Performed by: STUDENT IN AN ORGANIZED HEALTH CARE EDUCATION/TRAINING PROGRAM

## 2022-02-08 PROCEDURE — 99999 PR PBB SHADOW E&M-EST. PATIENT-LVL III: CPT | Mod: PBBFAC,,, | Performed by: STUDENT IN AN ORGANIZED HEALTH CARE EDUCATION/TRAINING PROGRAM

## 2022-02-08 PROCEDURE — 1101F PR PT FALLS ASSESS DOC 0-1 FALLS W/OUT INJ PAST YR: ICD-10-PCS | Mod: CPTII,S$GLB,, | Performed by: STUDENT IN AN ORGANIZED HEALTH CARE EDUCATION/TRAINING PROGRAM

## 2022-02-08 PROCEDURE — 99213 PR OFFICE/OUTPT VISIT, EST, LEVL III, 20-29 MIN: ICD-10-PCS | Mod: S$GLB,,, | Performed by: STUDENT IN AN ORGANIZED HEALTH CARE EDUCATION/TRAINING PROGRAM

## 2022-02-08 PROCEDURE — 3288F FALL RISK ASSESSMENT DOCD: CPT | Mod: CPTII,S$GLB,, | Performed by: STUDENT IN AN ORGANIZED HEALTH CARE EDUCATION/TRAINING PROGRAM

## 2022-02-08 PROCEDURE — 1159F MED LIST DOCD IN RCRD: CPT | Mod: CPTII,S$GLB,, | Performed by: STUDENT IN AN ORGANIZED HEALTH CARE EDUCATION/TRAINING PROGRAM

## 2022-02-08 RX ORDER — DEXAMETHASONE SODIUM PHOSPHATE 100 MG/10ML
INJECTION INTRAMUSCULAR; INTRAVENOUS
COMMUNITY
Start: 2021-08-18

## 2022-02-08 RX ORDER — TAMOXIFEN CITRATE 10 MG/1
TABLET ORAL
COMMUNITY

## 2022-02-15 ENCOUNTER — TELEPHONE (OUTPATIENT)
Dept: SLEEP MEDICINE | Facility: CLINIC | Age: 87
End: 2022-02-15
Payer: MEDICARE

## 2022-02-15 NOTE — TELEPHONE ENCOUNTER
----- Message from Margi Albert sent at 2/15/2022 12:30 PM CST -----  Regarding: Patient call back  Who called:JENNY CAMERON MARY [[id number]]    What is the request in detail: Patient is requesting a call back. She states she no longer needs a sooner appt and will be able to come 2/18.   Please advise.    Can the clinic reply by [facility]? No    Best call back number: [phone]    Additional Information: N/A

## 2022-02-16 ENCOUNTER — PATIENT OUTREACH (OUTPATIENT)
Dept: ADMINISTRATIVE | Facility: OTHER | Age: 87
End: 2022-02-16
Payer: MEDICARE

## 2022-02-16 NOTE — PROGRESS NOTES
Health Maintenance Due   Topic Date Due    Shingles Vaccine (1 of 2) Never done    COVID-19 Vaccine (2 - Pfizer 3-dose series) 02/04/2021     Updates were requested from care everywhere.  Chart was reviewed for overdue Proactive [facility] Encounters (DESTINY) topics (CRS, Breast Cancer Screening, Eye exam)  Health Maintenance has been updated.  LINKS immunization registry triggered.  Immunizations were reconciled.

## 2022-02-17 ENCOUNTER — TELEPHONE (OUTPATIENT)
Dept: SLEEP MEDICINE | Facility: CLINIC | Age: 87
End: 2022-02-17
Payer: MEDICARE

## 2022-02-18 ENCOUNTER — OFFICE VISIT (OUTPATIENT)
Dept: SLEEP MEDICINE | Facility: CLINIC | Age: 87
End: 2022-02-18
Payer: MEDICARE

## 2022-02-18 VITALS
SYSTOLIC BLOOD PRESSURE: 161 MMHG | WEIGHT: 130 LBS | HEIGHT: 58 IN | DIASTOLIC BLOOD PRESSURE: 72 MMHG | HEART RATE: 79 BPM | BODY MASS INDEX: 27.29 KG/M2

## 2022-02-18 DIAGNOSIS — I48.0 PAROXYSMAL ATRIAL FIBRILLATION: Primary | ICD-10-CM

## 2022-02-18 DIAGNOSIS — G47.33 OSA (OBSTRUCTIVE SLEEP APNEA): ICD-10-CM

## 2022-02-18 PROCEDURE — 1159F PR MEDICATION LIST DOCUMENTED IN MEDICAL RECORD: ICD-10-PCS | Mod: CPTII,S$GLB,, | Performed by: NURSE PRACTITIONER

## 2022-02-18 PROCEDURE — 99999 PR PBB SHADOW E&M-EST. PATIENT-LVL III: CPT | Mod: PBBFAC,,, | Performed by: NURSE PRACTITIONER

## 2022-02-18 PROCEDURE — 99213 PR OFFICE/OUTPT VISIT, EST, LEVL III, 20-29 MIN: ICD-10-PCS | Mod: S$GLB,,, | Performed by: NURSE PRACTITIONER

## 2022-02-18 PROCEDURE — 3288F PR FALLS RISK ASSESSMENT DOCUMENTED: ICD-10-PCS | Mod: CPTII,S$GLB,, | Performed by: NURSE PRACTITIONER

## 2022-02-18 PROCEDURE — 1126F PR PAIN SEVERITY QUANTIFIED, NO PAIN PRESENT: ICD-10-PCS | Mod: CPTII,S$GLB,, | Performed by: NURSE PRACTITIONER

## 2022-02-18 PROCEDURE — 3288F FALL RISK ASSESSMENT DOCD: CPT | Mod: CPTII,S$GLB,, | Performed by: NURSE PRACTITIONER

## 2022-02-18 PROCEDURE — 99213 OFFICE O/P EST LOW 20 MIN: CPT | Mod: S$GLB,,, | Performed by: NURSE PRACTITIONER

## 2022-02-18 PROCEDURE — 99999 PR PBB SHADOW E&M-EST. PATIENT-LVL III: ICD-10-PCS | Mod: PBBFAC,,, | Performed by: NURSE PRACTITIONER

## 2022-02-18 PROCEDURE — 1157F PR ADVANCE CARE PLAN OR EQUIV PRESENT IN MEDICAL RECORD: ICD-10-PCS | Mod: CPTII,S$GLB,, | Performed by: NURSE PRACTITIONER

## 2022-02-18 PROCEDURE — 1157F ADVNC CARE PLAN IN RCRD: CPT | Mod: CPTII,S$GLB,, | Performed by: NURSE PRACTITIONER

## 2022-02-18 PROCEDURE — 1126F AMNT PAIN NOTED NONE PRSNT: CPT | Mod: CPTII,S$GLB,, | Performed by: NURSE PRACTITIONER

## 2022-02-18 PROCEDURE — 1159F MED LIST DOCD IN RCRD: CPT | Mod: CPTII,S$GLB,, | Performed by: NURSE PRACTITIONER

## 2022-02-18 PROCEDURE — 1101F PT FALLS ASSESS-DOCD LE1/YR: CPT | Mod: CPTII,S$GLB,, | Performed by: NURSE PRACTITIONER

## 2022-02-18 PROCEDURE — 1101F PR PT FALLS ASSESS DOC 0-1 FALLS W/OUT INJ PAST YR: ICD-10-PCS | Mod: CPTII,S$GLB,, | Performed by: NURSE PRACTITIONER

## 2022-02-18 NOTE — PROGRESS NOTES
"Cc: BASILIO, last seen by NP Victor Manuel pulmonogy for BASILIO mgt, initial visit here in sleep clinic    Rev'd PSG/titration studies fro  Sleep lab 2015 (133#). She remains adherent with cpap 10cm either using old style resmed machine when [place] and at [facility] here [place] [facility] using respironics machine but its recalled. May use travel machine she has . W/PAP snoring resolved and sleep more consolidated. Brother present today. Side sleeper. Using mask over nose w/chin strap.   No machine today for interrogation and not in remote system  DME [facility],w ants new machine    BP (!) 161/72   Pulse 79   Ht 4' 10" (1.473 m)   Wt 59 kg (130 lb)   BMI 27.17 kg/m²     Assessment:  BASILIO, moderate    Plan:  APAP 8-12cm new machine (if not eligible via ins then she will pay out of pocket)    "

## 2022-02-21 ENCOUNTER — TELEPHONE (OUTPATIENT)
Dept: SLEEP MEDICINE | Facility: CLINIC | Age: 87
End: 2022-02-21
Payer: MEDICARE

## 2022-02-21 NOTE — TELEPHONE ENCOUNTER
Staff returned pt's call. Explained how I see there was an order placed the day of her appointment last Friday. It may take a few weeks given the shortage of cpap machines. She can call the dme to check the status of her machine. Pt didn't answer, lvm.

## 2022-02-21 NOTE — TELEPHONE ENCOUNTER
----- Message from Ivonne Martinez sent at 2/21/2022  1:27 PM CST -----  Type: Patient Call Back    Who called: self     What is the request in detail: pt is calling to check on her c-pap machine, she has been waiting a few weeks Please call and advise     Can the clinic reply by [facility]? No     Would the patient rather a call back or a response via [facility]?  Call    Best call back number: [phone]

## 2022-02-23 ENCOUNTER — OFFICE VISIT (OUTPATIENT)
Dept: PULMONOLOGY | Facility: CLINIC | Age: 87
End: 2022-02-23
Payer: MEDICARE

## 2022-02-23 VITALS
WEIGHT: 138.44 LBS | HEIGHT: 58 IN | HEART RATE: 87 BPM | OXYGEN SATURATION: 94 % | BODY MASS INDEX: 29.06 KG/M2 | DIASTOLIC BLOOD PRESSURE: 74 MMHG | SYSTOLIC BLOOD PRESSURE: 142 MMHG

## 2022-02-23 DIAGNOSIS — R04.2 HEMOPTYSIS: ICD-10-CM

## 2022-02-23 DIAGNOSIS — J90 LOCULATED PLEURAL EFFUSION: ICD-10-CM

## 2022-02-23 DIAGNOSIS — J90 PLEURAL EFFUSION: ICD-10-CM

## 2022-02-23 PROCEDURE — 3288F PR FALLS RISK ASSESSMENT DOCUMENTED: ICD-10-PCS | Mod: CPTII,S$GLB,, | Performed by: INTERNAL MEDICINE

## 2022-02-23 PROCEDURE — 99999 PR PBB SHADOW E&M-EST. PATIENT-LVL III: CPT | Mod: PBBFAC,,, | Performed by: INTERNAL MEDICINE

## 2022-02-23 PROCEDURE — 99999 PR PBB SHADOW E&M-EST. PATIENT-LVL III: ICD-10-PCS | Mod: PBBFAC,,, | Performed by: INTERNAL MEDICINE

## 2022-02-23 PROCEDURE — 1126F AMNT PAIN NOTED NONE PRSNT: CPT | Mod: CPTII,S$GLB,, | Performed by: INTERNAL MEDICINE

## 2022-02-23 PROCEDURE — 1101F PT FALLS ASSESS-DOCD LE1/YR: CPT | Mod: CPTII,S$GLB,, | Performed by: INTERNAL MEDICINE

## 2022-02-23 PROCEDURE — 1101F PR PT FALLS ASSESS DOC 0-1 FALLS W/OUT INJ PAST YR: ICD-10-PCS | Mod: CPTII,S$GLB,, | Performed by: INTERNAL MEDICINE

## 2022-02-23 PROCEDURE — 99214 PR OFFICE/OUTPT VISIT, EST, LEVL IV, 30-39 MIN: ICD-10-PCS | Mod: S$GLB,,, | Performed by: INTERNAL MEDICINE

## 2022-02-23 PROCEDURE — 3288F FALL RISK ASSESSMENT DOCD: CPT | Mod: CPTII,S$GLB,, | Performed by: INTERNAL MEDICINE

## 2022-02-23 PROCEDURE — 99214 OFFICE O/P EST MOD 30 MIN: CPT | Mod: S$GLB,,, | Performed by: INTERNAL MEDICINE

## 2022-02-23 PROCEDURE — 1159F MED LIST DOCD IN RCRD: CPT | Mod: CPTII,S$GLB,, | Performed by: INTERNAL MEDICINE

## 2022-02-23 PROCEDURE — 1157F ADVNC CARE PLAN IN RCRD: CPT | Mod: CPTII,S$GLB,, | Performed by: INTERNAL MEDICINE

## 2022-02-23 PROCEDURE — 1126F PR PAIN SEVERITY QUANTIFIED, NO PAIN PRESENT: ICD-10-PCS | Mod: CPTII,S$GLB,, | Performed by: INTERNAL MEDICINE

## 2022-02-23 PROCEDURE — 1157F PR ADVANCE CARE PLAN OR EQUIV PRESENT IN MEDICAL RECORD: ICD-10-PCS | Mod: CPTII,S$GLB,, | Performed by: INTERNAL MEDICINE

## 2022-02-23 PROCEDURE — 1159F PR MEDICATION LIST DOCUMENTED IN MEDICAL RECORD: ICD-10-PCS | Mod: CPTII,S$GLB,, | Performed by: INTERNAL MEDICINE

## 2022-02-23 NOTE — PROGRESS NOTES
"Subjective:   Patient ID:  Samantha Balderrama is a 89 y.o. female    Reason for visit: hemoptysis, effusion    Ms. Balderrama is a 89 yo F that presents with concerns for hemoptysis and an abnormal CXR. She is accomapnied by her friend that is helping take notes and answer questions for her since she has some difficulty hearing. She began having hemoptysis in January and work up at that time revealed a loculated fissural effusion. She had no fever, chills, pain, or other symptoms at that time except scant hemoptysis. She had a follow up CXR that was stable last week. She reports that her hemoptysis is almost completely resolved now. She may have a very small amount rarely. She continues to have no change in her exercise tolerance, breathing, or any pain or infectious symptoms.        Review of Systems   Constitutional: Negative for fever, malaise/fatigue and weight loss.   HENT: Positive for hearing loss. Negative for congestion and nosebleeds.    Eyes: Negative for photophobia.   Respiratory: Positive for hemoptysis and sputum production. Negative for cough, shortness of breath and wheezing.    Cardiovascular: Negative for chest pain, orthopnea and leg swelling.   Gastrointestinal: Negative for heartburn, nausea and vomiting.   Genitourinary: Negative.    Musculoskeletal: Negative.    Neurological: Negative for dizziness, seizures and loss of consciousness.   Psychiatric/Behavioral: Negative.       Objective:     Vitals:    02/23/22 1011   BP: (!) 142/74   BP Location: Left arm   Patient Position: Sitting   Pulse: 87   SpO2: (!) 94%   Weight: 62.8 kg (138 lb 7.2 oz)   Height: 4' 10" (1.473 m)         Physical Exam  Constitutional:       General: She is not in acute distress.     Appearance: She is not diaphoretic.      Comments: Elderly female in her wheelchair   HENT:      Head: Normocephalic and atraumatic.      Right Ear: External ear normal.      Left Ear: External ear normal.   Eyes:      Conjunctiva/sclera: " Conjunctivae normal.      Pupils: Pupils are equal, round, and reactive to light.   Neck:      Trachea: No tracheal deviation.   Cardiovascular:      Rate and Rhythm: Normal rate and regular rhythm.      Heart sounds: Normal heart sounds. No murmur heard.  Pulmonary:      Effort: Pulmonary effort is normal. No respiratory distress.      Breath sounds: Normal breath sounds. No stridor. No wheezing or rales.   Abdominal:      General: Bowel sounds are normal. There is no distension.      Palpations: Abdomen is soft.      Tenderness: There is no abdominal tenderness.   Musculoskeletal:         General: Normal range of motion.      Cervical back: Normal range of motion and neck supple.   Skin:     General: Skin is warm and dry.      Findings: No erythema.   Neurological:      Mental Status: She is alert and oriented to person, place, and time.      Gait: Gait is intact.   Psychiatric:         Mood and Affect: Mood and affect normal.         Cognition and Memory: Memory normal.         Judgment: Judgment normal.          Personal Diagnostic Review and Interpretation  Reviewed imaging (CT and CXR) and discussed with patient          Assessment:     1. Loculated pleural effusion  Ambulatory referral/consult to Pulmonology    X-Ray Chest PA And Lateral   2. Hemoptysis     3. Pleural effusion         Current Outpatient Medications   Medication Instructions    apixaban (ELIQUIS) 5 mg, Oral, 2 times daily    atorvastatin (LIPITOR) 40 mg, Oral, Daily    benzonatate (TESSALON) 200 mg, Oral, 3 times daily PRN    dexamethasone (DECADRON) 10 mg/mL injection No dose, route, or frequency recorded.    metoprolol succinate (TOPROL-XL) 25 mg, Oral, Daily    salt moisturizing solution no1 (OCEAN ULTRA SALINE MIST) Mist 2 Pump, Nasal, 2 times daily PRN    tamoxifen (NOLVADEX) 10 MG Tab tamoxifen Take 1 time per day No date recorded tablet 1 time per day No route recorded No set duration recorded No set duration amount recorded  active 10 mg    VIT A/VIT C/VIT E/ZINC/COPPER (PRESERVISION AREDS ORAL) Oral, Daily      Samantha Balderrama had no medications administered during this visit.     Orders Placed This Encounter   Procedures    X-Ray Chest PA And Lateral     Standing Status:   Future     Standing Expiration Date:   2/23/2023     Order Specific Question:   May the Radiologist modify the order per protocol to meet the clinical needs of the patient?     Answer:   Yes     Order Specific Question:   Release to patient     Answer:   Immediate      Plan:       Hemoptysis  Resolved since it began in January. Since she is on apixaban she will have an increased risk of developing some recurrent and non life threatening hemoptysis with bronchitis or any pulmonary infection.   Do not think it is related to her effusion.     Pleural effusion  Loculated effusion in her fissure- likely a sequela of previous radiation in that area. She currently has no infectious symptoms and not pain or concern. We discussed treatment and given her age, co-morbidities and lack of symptoms we agreed to monitor and not pursue any other treatment at this time.   She is aware of what to look for and when to call back with any change in symptoms  - Repeat CXR in 3 months and as long as it remains stable will plan to monitor clinically.       30 minutes of total time spent on the encounter, which includes face-to-face time and non-face-to-face time preparing to see the patient (eg, review of tests), obtaining and/or reviewing separately obtained history, documenting clinical information in the electronic or other health record, independently interpreting results (not separately reported), and communicating results to the patient/family/caregiver, or care coordination (not separately reported).     Portions of the record may have been created with voice-recognition software. Occasional wrong-word or sound-a-like substitutions may have occurred due to the inherent  limitations of voice-recognition software. Read the chart carefully and recognize, using context, where substitutions have occurred.    Emily Wall M.D.  Pulmonary/Critical Care

## 2022-02-23 NOTE — ASSESSMENT & PLAN NOTE
Resolved since it began in January. Since she is on apixaban she will have an increased risk of developing some recurrent and non life threatening hemoptysis with bronchitis or any pulmonary infection.   Do not think it is related to her effusion.

## 2022-02-23 NOTE — ASSESSMENT & PLAN NOTE
Loculated effusion in her fissure- likely a sequela of previous radiation in that area. She currently has no infectious symptoms and not pain or concern. We discussed treatment and given her age, co-morbidities and lack of symptoms we agreed to monitor and not pursue any other treatment at this time.   She is aware of what to look for and when to call back with any change in symptoms  - Repeat CXR in 3 months and as long as it remains stable will plan to monitor clinically.

## 2022-03-04 NOTE — TELEPHONE ENCOUNTER
No new care gaps identified.  Powered by [employer]. Reference number: [id number].   3/04/2022 2:04:38 PM CST

## 2022-03-04 NOTE — TELEPHONE ENCOUNTER
I tried calling pt, a Plavix Rx was received but not sure if pt is still taking this medication.  Pt have Eliquis on her current med list.  No answer at home or cell numbers.  Left message on both for pt to call the clinic.

## 2022-03-07 RX ORDER — CLOPIDOGREL BISULFATE 75 MG/1
75 TABLET ORAL DAILY
Qty: 30 TABLET | Refills: 0 | OUTPATIENT
Start: 2022-03-07 | End: 2022-04-06

## 2022-03-07 NOTE — TELEPHONE ENCOUNTER
I spoke to pt's sister Mayra, she was notified we received a Rx request for Plavix.   Pt is no longer taking this Rx.Pt is taking Eliquis.   I spoke to the pharmacy staff at [facility] and she was notified to cancel the request

## 2022-04-12 DIAGNOSIS — J98.01 COUGH DUE TO BRONCHOSPASM: ICD-10-CM

## 2022-04-12 RX ORDER — BENZONATATE 200 MG/1
200 CAPSULE ORAL 3 TIMES DAILY PRN
Qty: 30 CAPSULE | Refills: 1 | Status: SHIPPED | OUTPATIENT
Start: 2022-04-12 | End: 2022-08-09 | Stop reason: SDUPTHER

## 2022-04-12 NOTE — TELEPHONE ENCOUNTER
----- Message from Hilary Gutierrez sent at 4/12/2022  3:02 PM CDT -----  Contact: [phone]  Requesting an RX refill or new RX.  Is this a refill or new RX: refill  RX name and strength (copy/paste from chart):  benzonatate (TESSALON) 200 MG capsule  Is this a 30 day or 90 day RX: 30  Pharmacy name and phone # (copy/paste from chart):        [facility] - [place] - [address]  [address]  [place] [address]  Phone: [phone] Fax: [phone]      The doctors have asked that we provide their patients with the following 2 reminders -- prescription refills can take up to 72 hours, and a friendly reminder that in the future you can use your [facility] account to request refills: yes

## 2022-04-14 NOTE — PATIENT INSTRUCTIONS
Tumeric (Curmcumin) 1000mg/day (in supplement area at the store)    Will repeat bone density next July, will just schedule you back then, call if needed sooner     Cont vit D 50,000U weekly   
Secured with [facility] security

## 2022-04-16 ENCOUNTER — OFFICE VISIT (OUTPATIENT)
Dept: URGENT CARE | Facility: CLINIC | Age: 87
End: 2022-04-16
Payer: MEDICARE

## 2022-04-16 VITALS
WEIGHT: 130 LBS | RESPIRATION RATE: 16 BRPM | SYSTOLIC BLOOD PRESSURE: 156 MMHG | TEMPERATURE: 98 F | HEART RATE: 86 BPM | HEIGHT: 58 IN | BODY MASS INDEX: 27.29 KG/M2 | DIASTOLIC BLOOD PRESSURE: 74 MMHG | OXYGEN SATURATION: 94 %

## 2022-04-16 DIAGNOSIS — R09.81 NASAL CONGESTION: ICD-10-CM

## 2022-04-16 DIAGNOSIS — Z11.59 SCREENING FOR VIRAL DISEASE: Primary | ICD-10-CM

## 2022-04-16 DIAGNOSIS — J32.9 RHINOSINUSITIS: ICD-10-CM

## 2022-04-16 DIAGNOSIS — R09.82 POST-NASAL DRIP: ICD-10-CM

## 2022-04-16 LAB
CTP QC/QA: YES
CTP QC/QA: YES
POC MOLECULAR INFLUENZA A AGN: NEGATIVE
POC MOLECULAR INFLUENZA B AGN: NEGATIVE
SARS-COV-2 RDRP RESP QL NAA+PROBE: NEGATIVE

## 2022-04-16 PROCEDURE — 1160F PR REVIEW ALL MEDS BY PRESCRIBER/CLIN PHARMACIST DOCUMENTED: ICD-10-PCS | Mod: CPTII,S$GLB,, | Performed by: NURSE PRACTITIONER

## 2022-04-16 PROCEDURE — 99213 PR OFFICE/OUTPT VISIT, EST, LEVL III, 20-29 MIN: ICD-10-PCS | Mod: S$GLB,CS,, | Performed by: NURSE PRACTITIONER

## 2022-04-16 PROCEDURE — U0002 COVID-19 LAB TEST NON-CDC: HCPCS | Mod: QW,S$GLB,, | Performed by: NURSE PRACTITIONER

## 2022-04-16 PROCEDURE — 1159F PR MEDICATION LIST DOCUMENTED IN MEDICAL RECORD: ICD-10-PCS | Mod: CPTII,S$GLB,, | Performed by: NURSE PRACTITIONER

## 2022-04-16 PROCEDURE — 87502 INFLUENZA DNA AMP PROBE: CPT | Mod: QW,S$GLB,, | Performed by: NURSE PRACTITIONER

## 2022-04-16 PROCEDURE — 1159F MED LIST DOCD IN RCRD: CPT | Mod: CPTII,S$GLB,, | Performed by: NURSE PRACTITIONER

## 2022-04-16 PROCEDURE — 1157F PR ADVANCE CARE PLAN OR EQUIV PRESENT IN MEDICAL RECORD: ICD-10-PCS | Mod: CPTII,S$GLB,, | Performed by: NURSE PRACTITIONER

## 2022-04-16 PROCEDURE — 1160F RVW MEDS BY RX/DR IN RCRD: CPT | Mod: CPTII,S$GLB,, | Performed by: NURSE PRACTITIONER

## 2022-04-16 PROCEDURE — U0002: ICD-10-PCS | Mod: QW,S$GLB,, | Performed by: NURSE PRACTITIONER

## 2022-04-16 PROCEDURE — 1157F ADVNC CARE PLAN IN RCRD: CPT | Mod: CPTII,S$GLB,, | Performed by: NURSE PRACTITIONER

## 2022-04-16 PROCEDURE — 87502 POCT INFLUENZA A/B MOLECULAR: ICD-10-PCS | Mod: QW,S$GLB,, | Performed by: NURSE PRACTITIONER

## 2022-04-16 PROCEDURE — 99213 OFFICE O/P EST LOW 20 MIN: CPT | Mod: S$GLB,CS,, | Performed by: NURSE PRACTITIONER

## 2022-04-16 RX ORDER — LORATADINE 10 MG/1
10 TABLET ORAL DAILY PRN
Qty: 30 TABLET | Refills: 0 | Status: SHIPPED | OUTPATIENT
Start: 2022-04-16 | End: 2024-03-26

## 2022-04-16 NOTE — PATIENT INSTRUCTIONS
Reviewed negative COVID-19 virus and flu test with patient who verbalized understanding.  Advised patient that his symptoms are indicative of an upper respiratory infection which is viral in nature and should be treated symptomatically.  We discussed over-the-counter medications as well as home remedies to help with current symptoms.  We also discussed no antibiotic plan which the patient verbalized understanding.  Patient educational handouts are also included in discharge paperwork for patient who verbalized understanding agrees with plan of care.  Denies any further questions or concerns currently.  Patient exits exam room in no acute distress.

## 2022-04-16 NOTE — PROGRESS NOTES
"Subjective:       Patient ID: Samantha Balderrama is a 89 y.o. female.    Vitals:  height is 4' 10" (1.473 m) and weight is 59 kg (130 lb). Her temperature is 98.1 °F (36.7 °C). Her blood pressure is 156/74 (abnormal) and her pulse is 86. Her respiration is 16 and oxygen saturation is 94% (abnormal).     Chief Complaint: Cough    89-year-old female presents to the clinic with complaints of cough, nasal congestion, sneezing, postnasal drip treated with tessalon with mild relief. History positive for covid Pfizer vaccine x 3 doses 01/14/21, 02/24/21, 11/29/21, and influenza vaccine 09/23/21; sleep apnea with CPAP@10cm, breast, lung and skin cancer; pleural effusion 01/22. Positive for spending long hours outdoors enjoying the weather with onset of symptoms.     Cough  This is a new problem. The current episode started yesterday. The problem has been unchanged. The problem occurs constantly. The cough is productive of sputum. Associated symptoms include headaches, nasal congestion and postnasal drip. Pertinent negatives include no chest pain, chills, ear congestion, ear pain, fever, heartburn, hemoptysis, myalgias, rash, rhinorrhea, sore throat, shortness of breath, sweats, weight loss or wheezing. Associated symptoms comments: Sneezing. Nothing aggravates the symptoms. Treatments tried: Benzonatate. The treatment provided mild relief.       Constitution: Negative for chills and fever.   HENT: Positive for congestion and postnasal drip. Negative for ear pain, sinus pain, sinus pressure and sore throat.    Cardiovascular: Negative for chest pain and sob on exertion.   Respiratory: Positive for sleep apnea and cough. Negative for bloody sputum, shortness of breath and wheezing.    Gastrointestinal: Negative for heartburn.   Musculoskeletal: Negative for muscle ache.   Skin: Negative for color change and rash.   Allergic/Immunologic: Positive for sneezing.   Neurological: Positive for headaches. Negative for " disorientation.   Psychiatric/Behavioral: Negative for disorientation and confusion.       Objective:      Physical Exam   Constitutional: She is oriented to person, place, and time. She appears well-developed. She is cooperative.  Non-toxic appearance. She does not appear ill. No distress.   HENT:   Head: Normocephalic and atraumatic.   Ears:   Right Ear: Hearing, tympanic membrane, external ear and ear canal normal. Tympanic membrane is not bulging.   Left Ear: Hearing, tympanic membrane, external ear and ear canal normal. Tympanic membrane is not bulging.   Nose: Mucosal edema and rhinorrhea present. No nasal deformity. No epistaxis. Right sinus exhibits no maxillary sinus tenderness and no frontal sinus tenderness. Left sinus exhibits no maxillary sinus tenderness and no frontal sinus tenderness.   Mouth/Throat: Uvula is midline, oropharynx is clear and moist and mucous membranes are normal. No trismus in the jaw. Normal dentition. No uvula swelling. No oropharyngeal exudate, posterior oropharyngeal edema or posterior oropharyngeal erythema.   Eyes: Conjunctivae and lids are normal. No scleral icterus.   Neck: Trachea normal and phonation normal. Neck supple. No edema present. No erythema present. No neck rigidity present.   Cardiovascular: Normal rate, regular rhythm and normal heart sounds.   Pulmonary/Chest: Effort normal and breath sounds normal. No respiratory distress. She has no decreased breath sounds. She has no rhonchi.   Abdominal: Normal appearance.   Musculoskeletal: Normal range of motion.         General: No deformity. Normal range of motion.   Neurological: She is alert and oriented to person, place, and time. She exhibits normal muscle tone. Coordination normal.   Skin: Skin is warm, dry, intact, not diaphoretic and not pale.   Psychiatric: Her speech is normal and behavior is normal. Judgment and thought content normal.   Nursing note and vitals reviewed.        Assessment:       1. Screening  for viral disease    2. Rhinosinusitis    3. Post-nasal drip    4. Nasal congestion        Results for orders placed or performed in visit on 04/16/22   POCT COVID-19 Rapid Screening   Result Value Ref Range    POC Rapid COVID Negative Negative     Acceptable Yes    POCT Influenza A/B MOLECULAR   Result Value Ref Range    POC Molecular Influenza A Ag Negative Negative, Not Reported    POC Molecular Influenza B Ag Negative Negative, Not Reported     Acceptable Yes      Plan:         Screening for viral disease  -     POCT COVID-19 Rapid Screening  -     POCT Influenza A/B MOLECULAR    Rhinosinusitis  -     loratadine (CLARITIN) 10 mg tablet; Take 1 tablet (10 mg total) by mouth daily as needed for Allergies.  Dispense: 30 tablet; Refill: 0    Post-nasal drip  -     loratadine (CLARITIN) 10 mg tablet; Take 1 tablet (10 mg total) by mouth daily as needed for Allergies.  Dispense: 30 tablet; Refill: 0    Nasal congestion  -     loratadine (CLARITIN) 10 mg tablet; Take 1 tablet (10 mg total) by mouth daily as needed for Allergies.  Dispense: 30 tablet; Refill: 0         Patient Instructions   Reviewed negative COVID-19 virus and flu test with patient who verbalized understanding.  Advised patient that his symptoms are indicative of an upper respiratory infection which is viral in nature and should be treated symptomatically.  We discussed over-the-counter medications as well as home remedies to help with current symptoms.  We also discussed no antibiotic plan which the patient verbalized understanding.  Patient educational handouts are also included in discharge paperwork for patient who verbalized understanding agrees with plan of care.  Denies any further questions or concerns currently.  Patient exits exam room in no acute distress.

## 2022-05-16 ENCOUNTER — TELEPHONE (OUTPATIENT)
Dept: NEUROLOGY | Facility: CLINIC | Age: 87
End: 2022-05-16
Payer: MEDICARE

## 2022-05-16 NOTE — TELEPHONE ENCOUNTER
Staff has attempted to contact patient a few times regarding appointment. Phone keeps disconnecting

## 2022-05-16 NOTE — TELEPHONE ENCOUNTER
----- Message from Kylee Griggs MA sent at 5/10/2022  2:17 PM CDT -----  Type:  Sooner Apoointment Request    Caller is requesting a sooner appointment.   yes  Name of Caller: pt  When is the first available appointment? None given  Would the patient rather a call back or a response via [facility]? callback  Best Call Back Number: [phone]  Additional Information: pt needs a follow up appt scheduled after the sleep machine, would like an appt scheduled in July in the afternoon

## 2022-05-27 ENCOUNTER — HOSPITAL ENCOUNTER (OUTPATIENT)
Dept: RADIOLOGY | Facility: HOSPITAL | Age: 87
Discharge: HOME | End: 2022-05-27
Attending: INTERNAL MEDICINE
Payer: MEDICARE

## 2022-05-27 DIAGNOSIS — J90 LOCULATED PLEURAL EFFUSION: ICD-10-CM

## 2022-05-27 PROCEDURE — 71046 X-RAY EXAM CHEST 2 VIEWS: CPT | Mod: 26,,, | Performed by: RADIOLOGY

## 2022-05-27 PROCEDURE — 71046 XR CHEST PA AND LATERAL: ICD-10-PCS | Mod: 26,,, | Performed by: RADIOLOGY

## 2022-05-27 PROCEDURE — 71046 X-RAY EXAM CHEST 2 VIEWS: CPT | Mod: TC,PO

## 2022-06-17 ENCOUNTER — TELEPHONE (OUTPATIENT)
Dept: INTERNAL MEDICINE | Facility: CLINIC | Age: 87
End: 2022-06-17
Payer: MEDICARE

## 2022-06-17 DIAGNOSIS — R73.01 ABNORMAL FASTING GLUCOSE: ICD-10-CM

## 2022-06-17 DIAGNOSIS — E78.2 MIXED HYPERLIPIDEMIA: ICD-10-CM

## 2022-06-17 DIAGNOSIS — E55.9 HYPOVITAMINOSIS D: ICD-10-CM

## 2022-06-17 DIAGNOSIS — R53.83 FATIGUE, UNSPECIFIED TYPE: ICD-10-CM

## 2022-06-17 DIAGNOSIS — Z13.6 SCREENING FOR CARDIOVASCULAR CONDITION: ICD-10-CM

## 2022-06-17 DIAGNOSIS — Z00.00 PHYSICAL EXAM, ANNUAL: Primary | ICD-10-CM

## 2022-06-17 NOTE — TELEPHONE ENCOUNTER
Pt scheduled for annual appt.  Pt had labs done 1-6-22 by another provider.    Please order any labs that are needed. Thanks

## 2022-06-17 NOTE — TELEPHONE ENCOUNTER
----- Message from Ethel Mcleod sent at 6/16/2022  3:27 PM CDT -----  Contact: Pt @[phone]  type: Lab    Caller is requesting to schedule their Lab appointment prior to annual appointment.  Order is not listed in EPIC.  Please enter order and contact patient to schedule.    Name of Caller Samantha     Preferred Date and Time of Labs: 9/29/2022    Date of Annual Physical Appointment: Annual     Where would they like the lab performed? [facility]     Would the patient rather a call back or a response via [facility]? Call back     Best Call Back Number: [phone]    Additional Information  Please call back to advise.

## 2022-06-17 NOTE — TELEPHONE ENCOUNTER
I tried calling pt to schedule lab appt.  No answer. Left Vm for pt to call the clinic to schedule an appt.

## 2022-07-07 DIAGNOSIS — I49.8 OTHER SPECIFIED CARDIAC ARRHYTHMIAS: Primary | ICD-10-CM

## 2022-07-28 ENCOUNTER — OFFICE VISIT (OUTPATIENT)
Dept: ELECTROPHYSIOLOGY | Facility: CLINIC | Age: 87
End: 2022-07-28
Payer: MEDICARE

## 2022-07-28 VITALS
SYSTOLIC BLOOD PRESSURE: 190 MMHG | DIASTOLIC BLOOD PRESSURE: 75 MMHG | HEIGHT: 58 IN | WEIGHT: 138.25 LBS | BODY MASS INDEX: 29.02 KG/M2 | HEART RATE: 89 BPM

## 2022-07-28 DIAGNOSIS — M15.9 PRIMARY OSTEOARTHRITIS INVOLVING MULTIPLE JOINTS: ICD-10-CM

## 2022-07-28 DIAGNOSIS — Z85.89 HISTORY OF SECONDARY LUNG CANCER: ICD-10-CM

## 2022-07-28 DIAGNOSIS — E78.2 MIXED HYPERLIPIDEMIA: ICD-10-CM

## 2022-07-28 DIAGNOSIS — E66.3 OVERWEIGHT: ICD-10-CM

## 2022-07-28 DIAGNOSIS — G47.33 OSA (OBSTRUCTIVE SLEEP APNEA): ICD-10-CM

## 2022-07-28 DIAGNOSIS — C44.81 BASAL CELL CARCINOMA (BCC) OF OVERLAPPING SITES OF SKIN: ICD-10-CM

## 2022-07-28 DIAGNOSIS — K75.81 NASH (NONALCOHOLIC STEATOHEPATITIS): ICD-10-CM

## 2022-07-28 DIAGNOSIS — Z85.828 HISTORY OF SKIN CANCER: ICD-10-CM

## 2022-07-28 DIAGNOSIS — C34.90 ADENOCARCINOMA OF LUNG, UNSPECIFIED LATERALITY: ICD-10-CM

## 2022-07-28 DIAGNOSIS — Z85.3 HISTORY OF BILATERAL BREAST CANCER: ICD-10-CM

## 2022-07-28 DIAGNOSIS — I63.411 CEREBROVASCULAR ACCIDENT (CVA) DUE TO EMBOLISM OF RIGHT MIDDLE CEREBRAL ARTERY: ICD-10-CM

## 2022-07-28 DIAGNOSIS — Z79.01 CURRENT USE OF LONG TERM ANTICOAGULATION: ICD-10-CM

## 2022-07-28 DIAGNOSIS — M81.0 SENILE OSTEOPOROSIS: ICD-10-CM

## 2022-07-28 DIAGNOSIS — I49.8 OTHER SPECIFIED CARDIAC ARRHYTHMIAS: ICD-10-CM

## 2022-07-28 DIAGNOSIS — I70.0 ATHEROSCLEROSIS OF AORTA: ICD-10-CM

## 2022-07-28 DIAGNOSIS — I48.0 PAROXYSMAL ATRIAL FIBRILLATION: Primary | ICD-10-CM

## 2022-07-28 DIAGNOSIS — I63.411 EMBOLIC STROKE INVOLVING RIGHT MIDDLE CEREBRAL ARTERY: ICD-10-CM

## 2022-07-28 DIAGNOSIS — I10 PRIMARY HYPERTENSION: ICD-10-CM

## 2022-07-28 PROCEDURE — 1157F PR ADVANCE CARE PLAN OR EQUIV PRESENT IN MEDICAL RECORD: ICD-10-PCS | Mod: CPTII,S$GLB,, | Performed by: STUDENT IN AN ORGANIZED HEALTH CARE EDUCATION/TRAINING PROGRAM

## 2022-07-28 PROCEDURE — 1126F AMNT PAIN NOTED NONE PRSNT: CPT | Mod: CPTII,S$GLB,, | Performed by: STUDENT IN AN ORGANIZED HEALTH CARE EDUCATION/TRAINING PROGRAM

## 2022-07-28 PROCEDURE — 93005 RHYTHM STRIP: ICD-10-PCS | Mod: S$GLB,,, | Performed by: STUDENT IN AN ORGANIZED HEALTH CARE EDUCATION/TRAINING PROGRAM

## 2022-07-28 PROCEDURE — 99999 PR PBB SHADOW E&M-EST. PATIENT-LVL III: CPT | Mod: PBBFAC,,, | Performed by: STUDENT IN AN ORGANIZED HEALTH CARE EDUCATION/TRAINING PROGRAM

## 2022-07-28 PROCEDURE — 99214 OFFICE O/P EST MOD 30 MIN: CPT | Mod: S$GLB,,, | Performed by: STUDENT IN AN ORGANIZED HEALTH CARE EDUCATION/TRAINING PROGRAM

## 2022-07-28 PROCEDURE — 1126F PR PAIN SEVERITY QUANTIFIED, NO PAIN PRESENT: ICD-10-PCS | Mod: CPTII,S$GLB,, | Performed by: STUDENT IN AN ORGANIZED HEALTH CARE EDUCATION/TRAINING PROGRAM

## 2022-07-28 PROCEDURE — 93010 ELECTROCARDIOGRAM REPORT: CPT | Mod: S$GLB,,, | Performed by: INTERNAL MEDICINE

## 2022-07-28 PROCEDURE — 93010 RHYTHM STRIP: ICD-10-PCS | Mod: S$GLB,,, | Performed by: INTERNAL MEDICINE

## 2022-07-28 PROCEDURE — 1159F MED LIST DOCD IN RCRD: CPT | Mod: CPTII,S$GLB,, | Performed by: STUDENT IN AN ORGANIZED HEALTH CARE EDUCATION/TRAINING PROGRAM

## 2022-07-28 PROCEDURE — 93005 ELECTROCARDIOGRAM TRACING: CPT | Mod: S$GLB,,, | Performed by: STUDENT IN AN ORGANIZED HEALTH CARE EDUCATION/TRAINING PROGRAM

## 2022-07-28 PROCEDURE — 1157F ADVNC CARE PLAN IN RCRD: CPT | Mod: CPTII,S$GLB,, | Performed by: STUDENT IN AN ORGANIZED HEALTH CARE EDUCATION/TRAINING PROGRAM

## 2022-07-28 PROCEDURE — 99214 PR OFFICE/OUTPT VISIT, EST, LEVL IV, 30-39 MIN: ICD-10-PCS | Mod: S$GLB,,, | Performed by: STUDENT IN AN ORGANIZED HEALTH CARE EDUCATION/TRAINING PROGRAM

## 2022-07-28 PROCEDURE — 99999 PR PBB SHADOW E&M-EST. PATIENT-LVL III: ICD-10-PCS | Mod: PBBFAC,,, | Performed by: STUDENT IN AN ORGANIZED HEALTH CARE EDUCATION/TRAINING PROGRAM

## 2022-07-28 PROCEDURE — 1159F PR MEDICATION LIST DOCUMENTED IN MEDICAL RECORD: ICD-10-PCS | Mod: CPTII,S$GLB,, | Performed by: STUDENT IN AN ORGANIZED HEALTH CARE EDUCATION/TRAINING PROGRAM

## 2022-07-28 NOTE — PROGRESS NOTES
"Electrophysiology Clinic Note    Reason for follow-up patient visit: Ongoing evaluation and recommendations following a prior stroke with paroxysmal atrial fibrillation.     PRESENTING HISTORY:     History of Present Illness:  Ms. Samantha Balderrama is a joan 89-year-old woman who returns to clinic today for ongoing evaluation and recommendations following a prior stroke with paroxysmal atrial fibrillation noted on ambulatory event monitoring. She has a past medical history significant for paroxysmal atrial fibrillation, a prior stroke of the right middle cerebral artery 2/2/2021, aortic atherosclerosis, mild-to-moderate aortic stenosis, adenocarcinoma of the lung, bilateral breast cancer s/p bilateral mastectomy, obstructive sleep apnea maintained on CPAP, hypertension, hyperlipidemia, basal cell carcinoma of the forehead and nose, osteoarthritis, Drusen retinal degeneration, and non-alcoholic fatty liver.     In brief review of her previous stroke, she reports that she was in her baseline level of physical health until the evening of 2/1/2021 when she reported symptoms of slurred speech and word searching. She attributed these symptoms to "being tired", and did not present for medical attention until 2/2/2021. Alteplase not administered due to being outside of the treatment window. A CT at that admission did not evidence an acute intracranial process, with a CTA head showing hypoattenuation within the right frontal lobe and complete occlusion to the superior branch of the right MCA with good collateral blood flow, and irregular plaque proximal to the common carotid artery and high grade stenosis of both vessels. A subsequent MRI confirmed an acute infarction to the right frontal lobe with superior branch R MCA occlusion. She underwent evaluation with a TTE that showed preserved systolic function, LVEF 65%, and severe left atrial enlargement. She was discharged home with home health, PT, OT, and speech therapy. " "She then remained on aspirin and Plavix, and denied any adverse bleeding events. A 30-day event monitor was obtained and revealed a new diagnosis of paroxysmal atrial fibrillation. Her anticoagulation regimen was then modified to include cessation of the ASA/Plavix and initiation of apixaban 5mg po BID therapy. She has been compliant with this medication and denies any bleeding episodes while on oral anticoagulation. She has only been taking essential medications, including her apixaban, metoprolol succinate, and her atorvastatin, as she wishes to avoid "taking too many pills".     She presents to clinic today with her younger sister, with whom she lives. Both the patient and her sister feel that she has continued to make significant progress. She feels that she is back to her baseline in terms of speech, with very mild word searching. She denies any motor or sensory deficits. She tells me that she is feeling overall quite well. She denies any episodes of dizziness, lightheadedness, syncope/presyncope, palpitations, chest pain or chest discomfort, shortness of breath or dyspnea on exertions, nausea or vomiting, orthopnea, lower extremity edema, or PND. She reports continued bilateral knee arthritis which limits her ability to walk for long distances, and she ambulates with a straight cane outside of the house. She continues to perform her exercises that she learned with PT. Of note, they accidentally went to the [facility] location initially, and were about an hour late getting to the [facility] location. She reports that the stress made her blood pressure elevated; she tells me her home recordings are much better than our clinic recording today.      Review of Systems:  Review of Systems   Constitutional: Negative for activity change.   HENT: Positive for hearing loss. Negative for nosebleeds, rhinorrhea, sinus pressure/congestion, sneezing and sore throat.    Respiratory: Negative for apnea, cough, chest tightness, " shortness of breath and wheezing.    Cardiovascular: Negative for chest pain, palpitations and leg swelling.   Gastrointestinal: Negative for abdominal distention, abdominal pain, blood in stool, change in bowel habit, constipation, diarrhea, nausea, vomiting and change in bowel habit.   Genitourinary: Negative for dysuria and hematuria.   Musculoskeletal: Positive for arthralgias. Negative for gait problem.        Ambulates with a straight cane outside of the house.    Neurological: Positive for speech difficulty. Negative for dizziness, seizures, syncope, weakness, light-headedness, headaches, disturbances in coordination and coordination difficulties.        Mild word searching; reports this continues to improve following her prior stroke.         PAST HISTORY:     Past Medical History:   Diagnosis Date    (HFpEF) heart failure with preserved ejection fraction 1/6/2022    Allergy     Basal cell carcinoma 2003    Dr. 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 SAQIB Nielsen/ right side of nose and forhead also.    Stroke 2/2/2021       Past Surgical History:   Procedure Laterality Date    BREAST SURGERY Right 1990    x2- Mastectomy    BREAST SURGERY Left     Mastectomy    CATARACT EXTRACTION Bilateral 2012    Dr. Crocker    EYE SURGERY      MASTECTOMY, RADICAL Left 1988       Family History:  Family History   Problem Relation Age of Onset    Heart failure Mother     Hypertension Mother     Heart disease Mother     Heart failure Father     Heart disease Father     Heart failure Brother     Hypertension Brother     Heart disease Brother     Hypertension Sister     Hypertension Sister     Heart disease Sister     Cancer  Maternal Aunt     Hypertension Maternal Uncle     Hypertension Paternal Aunt     Hypertension Paternal Uncle     Asthma Cousin     Cancer Cousin     Amblyopia Neg Hx     Blindness Neg Hx     Cataracts Neg Hx     Diabetes Neg Hx     Glaucoma Neg Hx     Macular degeneration Neg Hx     Retinal detachment Neg Hx     Strabismus Neg Hx     Stroke Neg Hx     Thyroid disease Neg Hx     Melanoma Neg Hx     Psoriasis Neg Hx     Lupus Neg Hx        Social History:  She  reports that she has never smoked. She has never used smokeless tobacco. She reports current alcohol use. She reports that she does not use drugs.      MEDICATIONS & ALLERGIES:     Review of patient's allergies indicates:  No Known Allergies    Current Outpatient Medications on File Prior to Visit   Medication Sig Dispense Refill    apixaban (ELIQUIS) 5 mg Tab Take 1 tablet (5 mg total) by mouth 2 (two) times daily. 60 tablet 11    atorvastatin (LIPITOR) 40 MG tablet Take 1 tablet (40 mg total) by mouth once daily. 90 tablet 3    benzonatate (TESSALON) 200 MG capsule Take 1 capsule (200 mg total) by mouth 3 (three) times daily as needed for Cough. 30 capsule 1    dexamethasone (DECADRON) 10 mg/mL injection       loratadine (CLARITIN) 10 mg tablet Take 1 tablet (10 mg total) by mouth daily as needed for Allergies. 30 tablet 0    metoprolol succinate (TOPROL-XL) 25 MG 24 hr tablet Take 1 tablet (25 mg total) by mouth once daily. 30 tablet 11    salt moisturizing solution no1 (OCEAN ULTRA SALINE MIST) Mist 2 Pump by Nasal route 2 (two) times daily as needed. 90 mL 0    tamoxifen (NOLVADEX) 10 MG Tab tamoxifen Take 1 time per day No date recorded tablet 1 time per day No route recorded No set duration recorded No set duration amount recorded active 10 mg      VIT A/VIT C/VIT E/ZINC/COPPER (PRESERVISION AREDS ORAL) Take by mouth once daily.        No current facility-administered medications on file prior to visit.        OBJECTIVE:  "    Vital Signs:  BP (!) 190/75   Pulse 89   Ht 4' 10" (1.473 m)   Wt 62.7 kg (138 lb 3.7 oz)   BMI 28.89 kg/m²     Physical Exam:  Physical Exam  Constitutional:       General: She is not in acute distress.     Appearance: Normal appearance. She is not ill-appearing or diaphoretic.      Comments: Well-appearing elderly woman in NAD, ambulating with a straight cane.   HENT:      Head: Normocephalic and atraumatic.      Comments: Mask worn in clinic in the setting of COVID precautions. Hard of hearing.   Eyes:      Pupils: Pupils are equal, round, and reactive to light.   Cardiovascular:      Rate and Rhythm: Normal rate and regular rhythm.      Pulses: Normal pulses.      Heart sounds: Murmur heard.     No friction rub. No gallop.      Comments: II/VI OTILIA best appreciated at the RUSB with radiation to the carotids.  Pulmonary:      Effort: Pulmonary effort is normal. No respiratory distress.      Breath sounds: Normal breath sounds. No wheezing, rhonchi or rales.   Chest:      Chest wall: No tenderness.   Abdominal:      General: There is no distension.      Palpations: Abdomen is soft.      Tenderness: There is no abdominal tenderness.   Musculoskeletal:         General: No swelling or tenderness.      Cervical back: Normal range of motion.      Right lower leg: No edema.      Left lower leg: No edema.   Skin:     General: Skin is warm and dry.      Findings: No erythema, lesion or rash.   Neurological:      General: No focal deficit present.      Mental Status: She is alert and oriented to person, place, and time. Mental status is at baseline.      Motor: No weakness.      Gait: Gait normal.   Psychiatric:         Mood and Affect: Mood normal.         Behavior: Behavior normal.        Laboratory Data:  Lab Results   Component Value Date    WBC 4.36 01/07/2022    HGB 11.8 (L) 01/07/2022    HCT 37.0 01/07/2022    MCV 86 01/07/2022     01/07/2022     Lab Results   Component Value Date     (H) " 01/07/2022     01/07/2022    K 3.5 01/07/2022     01/07/2022    CO2 27 01/07/2022    BUN 16 01/07/2022    CREATININE 0.6 01/07/2022    CALCIUM 9.2 01/07/2022    MG 2.0 01/07/2022     Lab Results   Component Value Date    INR 1.0 02/03/2021    INR 0.9 02/02/2021     Pertinent Cardiac Data:  ECG: Normal sinus rhythm with rate of 89 bpm,  ms, QRS 76 ms, QT/QTc 366/445 ms, voltage criteria for LVH.    Resting 2D Transthoracic Echocardiogram - 2/3/2021:  · The left ventricle is normal in size with concentric remodeling and normal systolic function. The estimated ejection fraction is 65%  · Indeterminate left ventricular diastolic function.  · Normal right ventricular size with normal right ventricular systolic function.  · Severe left atrial enlargement.  · There is trivial to mild calcific mitral stenosis with restriction of MV leaflts.The mean diastolic gradient across the mitral valve is 4 mmHg at a heart rate of 88 bpm.  · Incomplete TR envelope. PA pressure cannot be estimated.  · Normal central venous pressure (3 mmHg).  · The mean diastolic gradient across the mitral valve is 4 mmHg at a heart rate of 88 bpm.  · There is mild-to-moderate low flow aortic valve stenosis. Aortic valve area is 1.42 cm2; peak velocity is 1.99 m/s; mean gradient is 9 mmHg.  · Trivial posterior pericardial effusion.    24-Day Ambulatory Event Monitor - 4/1/2021:   Asymptomatic paroxysmal atrial fibrillation. The average HR was 83 bpm. 4% PACs, <1% PVCs. There were a total of 32 AF events, total AF burden <1%. Longest episode 72 minutes. Average HR in  bpm. The patient reported no symptoms.    Resting 2D Transthoracic Echocardiogram - 1/6/2022:  · The left ventricle is normal in size with concentric remodeling and normal systolic function.  · The estimated ejection fraction is 65%.  · Indeterminate left ventricular diastolic function.  · There is mild-to-moderate aortic valve stenosis. Aortic valve area is 1.16  cm2; peak velocity is 2.17 m/s; mean gradient is 13 mmHg. Low stroke volume of 32cc/m2, which may under-estimate MG/PV. Mild aortic regurgitation.  · Mitral annular calcification is present with restricted mitral leaflets. Mean gradient 5mmHg across the mitral valve with heart rate of 90bpm.  · Normal right ventricular size with normal right ventricular systolic function.  · Normal central venous pressure (3 mmHg).  · The estimated PA systolic pressure is 36 mmHg.  · Moderate left atrial enlargement.      ASSESSMENT & PLAN:   Ms. Samantha Balderrama is a joan 89-year-old woman who returns to clinic today for ongoing evaluation and recommendations following a prior stroke with paroxysmal atrial fibrillation noted on ambulatory event monitoring. She has a past medical history significant for paroxysmal atrial fibrillation, a prior stroke of the right middle cerebral artery 2/2/2021, aortic atherosclerosis, mild-to-moderate aortic stenosis, adenocarcinoma of the lung, bilateral breast cancer s/p bilateral mastectomy, obstructive sleep apnea maintained on CPAP, hypertension, hyperlipidemia, basal cell carcinoma of the forehead and nose, osteoarthritis, Drusen retinal degeneration, and non-alcoholic fatty liver. She remains in normal sinus rhythm today. Her prior CVA was likely cardioembolic in nature in the setting of her atrial fibrillation diagnosis.     - We discussed the pathophysiology of atrial fibrillation; specifically, we discussed paroxysmal atrial fibrillation and the concept that some patients may experience paroxysms of atrial fibrillation interrupting periods of sinus rhythm. We discussed that even a relatively low burden of atrial fibrillation continues to have an increased risk of CVA.   - She remains on rate control with metoprolol succinate 25mg po daily, with no reported symptoms of palpitations. Her prior ambulatory monitor prior to Bb initiation revealed several periods of atrial fibrillation with  RVR - average HR of 103 bpm while in atrial fibrillation. Despite her episodes of AF with RVR, her overall burden of atrial fibrillation remains very low at <1%.   - Her [id number] is 6 (HTN, prior CVA, female gender, age > 75), portending an annual adjusted risk of CVA of 9.8%. She remains on uninterrupted apixaban 5mg po BID therapy with no bleeding events reported.  - We discussed the possibility of rhythm control with initiation of an antiarrhythmic agent. As she is having atrial fibrillation so infrequently, this is not required at this time. She voices understanding, and we will continue her rate control strategy at this time.   - Possible underlying drivers of atrial fibrillation were addressed at this appointment, including recommendations for maintenance of a healthy BMI. Review of laboratory data revealed stable and acceptable TSH at 2.359. She continues to use her CPAP machine for ongoing treatment of her BASILIO.   - She will continue to follow with her PCP and neurology for ongoing surveillance and treatment following her prior CVA.     This patient will return to clinic with me in one year for her annual assessment, with instructions to call my office to schedule sooner in the event she develops recurrent symptoms of palpitations. All questions and concerns were addressed at this encounter.     Signing Physician:       ANDREIA Mitchell MD  Electrophysiology Attending

## 2022-08-09 DIAGNOSIS — J98.01 COUGH DUE TO BRONCHOSPASM: ICD-10-CM

## 2022-08-09 RX ORDER — BENZONATATE 200 MG/1
200 CAPSULE ORAL 3 TIMES DAILY PRN
Qty: 30 CAPSULE | Refills: 1 | Status: SHIPPED | OUTPATIENT
Start: 2022-08-09

## 2022-08-09 NOTE — TELEPHONE ENCOUNTER
----- Message from Edgard Michael sent at 8/9/2022 12:50 PM CDT -----  Contact: Friend/ [facility] [phone]  Refill    Patient is calling for Medical Advice regarding: Cough at night    How long has patient had these symptoms: Couple of months    Pharmacy name and phone#:  [facility] ([place]) - [id number]   Phone:  [phone] Fax:  [phone]    benzonatate (TESSALON) 200 MG capsule

## 2022-08-09 NOTE — TELEPHONE ENCOUNTER
Last OV 2-8-22    I spoke to pt, she is reporting a cough for the last few months. No other symptoms.  She is requesting Rx below,   Thanks

## 2022-09-26 ENCOUNTER — LAB VISIT (OUTPATIENT)
Dept: LAB | Facility: HOSPITAL | Age: 87
End: 2022-09-26
Attending: STUDENT IN AN ORGANIZED HEALTH CARE EDUCATION/TRAINING PROGRAM
Payer: MEDICARE

## 2022-09-26 DIAGNOSIS — Z13.6 SCREENING FOR CARDIOVASCULAR CONDITION: ICD-10-CM

## 2022-09-26 DIAGNOSIS — R53.83 FATIGUE, UNSPECIFIED TYPE: ICD-10-CM

## 2022-09-26 DIAGNOSIS — Z00.00 PHYSICAL EXAM, ANNUAL: ICD-10-CM

## 2022-09-26 DIAGNOSIS — E55.9 HYPOVITAMINOSIS D: ICD-10-CM

## 2022-09-26 DIAGNOSIS — R73.01 ABNORMAL FASTING GLUCOSE: ICD-10-CM

## 2022-09-26 DIAGNOSIS — E78.2 MIXED HYPERLIPIDEMIA: ICD-10-CM

## 2022-09-26 LAB
25(OH)D3+25(OH)D2 SERPL-MCNC: 15 NG/ML (ref 30–96)
ALBUMIN SERPL BCP-MCNC: 3.8 G/DL (ref 3.5–5.2)
ALP SERPL-CCNC: 76 U/L (ref 55–135)
ALT SERPL W/O P-5'-P-CCNC: 16 U/L (ref 10–44)
ANION GAP SERPL CALC-SCNC: 8 MMOL/L (ref 8–16)
AST SERPL-CCNC: 20 U/L (ref 10–40)
BASOPHILS # BLD AUTO: 0.04 K/UL (ref 0–0.2)
BASOPHILS NFR BLD: 0.8 % (ref 0–1.9)
BILIRUB SERPL-MCNC: 1 MG/DL (ref 0.1–1)
BUN SERPL-MCNC: 17 MG/DL (ref 10–30)
CALCIUM SERPL-MCNC: 9.2 MG/DL (ref 8.7–10.5)
CHLORIDE SERPL-SCNC: 109 MMOL/L (ref 95–110)
CHOLEST SERPL-MCNC: 128 MG/DL (ref 120–199)
CHOLEST/HDLC SERPL: 2.6 {RATIO} (ref 2–5)
CO2 SERPL-SCNC: 24 MMOL/L (ref 23–29)
CREAT SERPL-MCNC: 0.6 MG/DL (ref 0.5–1.4)
DIFFERENTIAL METHOD: ABNORMAL
EOSINOPHIL # BLD AUTO: 0.1 K/UL (ref 0–0.5)
EOSINOPHIL NFR BLD: 1.7 % (ref 0–8)
ERYTHROCYTE [DISTWIDTH] IN BLOOD BY AUTOMATED COUNT: 14.8 % (ref 11.5–14.5)
EST. GFR  (NO RACE VARIABLE): >60 ML/MIN/1.73 M^2
ESTIMATED AVG GLUCOSE: 111 MG/DL (ref 68–131)
GLUCOSE SERPL-MCNC: 103 MG/DL (ref 70–110)
HBA1C MFR BLD: 5.5 % (ref 4–5.6)
HCT VFR BLD AUTO: 40.2 % (ref 37–48.5)
HDLC SERPL-MCNC: 49 MG/DL (ref 40–75)
HDLC SERPL: 38.3 % (ref 20–50)
HGB BLD-MCNC: 12.8 G/DL (ref 12–16)
IMM GRANULOCYTES # BLD AUTO: 0.02 K/UL (ref 0–0.04)
IMM GRANULOCYTES NFR BLD AUTO: 0.4 % (ref 0–0.5)
LDLC SERPL CALC-MCNC: 58.8 MG/DL (ref 63–159)
LYMPHOCYTES # BLD AUTO: 1.3 K/UL (ref 1–4.8)
LYMPHOCYTES NFR BLD: 26 % (ref 18–48)
MCH RBC QN AUTO: 27.6 PG (ref 27–31)
MCHC RBC AUTO-ENTMCNC: 31.8 G/DL (ref 32–36)
MCV RBC AUTO: 87 FL (ref 82–98)
MONOCYTES # BLD AUTO: 0.6 K/UL (ref 0.3–1)
MONOCYTES NFR BLD: 10.9 % (ref 4–15)
NEUTROPHILS # BLD AUTO: 3.1 K/UL (ref 1.8–7.7)
NEUTROPHILS NFR BLD: 60.2 % (ref 38–73)
NONHDLC SERPL-MCNC: 79 MG/DL
NRBC BLD-RTO: 0 /100 WBC
PLATELET # BLD AUTO: 182 K/UL (ref 150–450)
PMV BLD AUTO: 10.1 FL (ref 9.2–12.9)
POTASSIUM SERPL-SCNC: 4 MMOL/L (ref 3.5–5.1)
PROT SERPL-MCNC: 7.4 G/DL (ref 6–8.4)
RBC # BLD AUTO: 4.64 M/UL (ref 4–5.4)
SODIUM SERPL-SCNC: 141 MMOL/L (ref 136–145)
T4 FREE SERPL-MCNC: 0.8 NG/DL (ref 0.71–1.51)
T4 SERPL-MCNC: 6.4 UG/DL (ref 4.5–11.5)
TRIGL SERPL-MCNC: 101 MG/DL (ref 30–150)
TSH SERPL DL<=0.005 MIU/L-ACNC: 5.25 UIU/ML (ref 0.4–4)
WBC # BLD AUTO: 5.15 K/UL (ref 3.9–12.7)

## 2022-09-26 PROCEDURE — 80053 COMPREHEN METABOLIC PANEL: CPT | Performed by: STUDENT IN AN ORGANIZED HEALTH CARE EDUCATION/TRAINING PROGRAM

## 2022-09-26 PROCEDURE — 85025 COMPLETE CBC W/AUTO DIFF WBC: CPT | Performed by: STUDENT IN AN ORGANIZED HEALTH CARE EDUCATION/TRAINING PROGRAM

## 2022-09-26 PROCEDURE — 84436 ASSAY OF TOTAL THYROXINE: CPT | Performed by: STUDENT IN AN ORGANIZED HEALTH CARE EDUCATION/TRAINING PROGRAM

## 2022-09-26 PROCEDURE — 36415 COLL VENOUS BLD VENIPUNCTURE: CPT | Mod: PO | Performed by: STUDENT IN AN ORGANIZED HEALTH CARE EDUCATION/TRAINING PROGRAM

## 2022-09-26 PROCEDURE — 80061 LIPID PANEL: CPT | Performed by: STUDENT IN AN ORGANIZED HEALTH CARE EDUCATION/TRAINING PROGRAM

## 2022-09-26 PROCEDURE — 82306 VITAMIN D 25 HYDROXY: CPT | Performed by: STUDENT IN AN ORGANIZED HEALTH CARE EDUCATION/TRAINING PROGRAM

## 2022-09-26 PROCEDURE — 84439 ASSAY OF FREE THYROXINE: CPT | Performed by: STUDENT IN AN ORGANIZED HEALTH CARE EDUCATION/TRAINING PROGRAM

## 2022-09-26 PROCEDURE — 84443 ASSAY THYROID STIM HORMONE: CPT | Performed by: STUDENT IN AN ORGANIZED HEALTH CARE EDUCATION/TRAINING PROGRAM

## 2022-09-26 PROCEDURE — 83036 HEMOGLOBIN GLYCOSYLATED A1C: CPT | Performed by: STUDENT IN AN ORGANIZED HEALTH CARE EDUCATION/TRAINING PROGRAM

## 2022-09-30 ENCOUNTER — OFFICE VISIT (OUTPATIENT)
Dept: INTERNAL MEDICINE | Facility: CLINIC | Age: 87
End: 2022-09-30
Payer: MEDICARE

## 2022-09-30 VITALS
WEIGHT: 143.06 LBS | HEIGHT: 58 IN | TEMPERATURE: 98 F | BODY MASS INDEX: 30.03 KG/M2 | SYSTOLIC BLOOD PRESSURE: 150 MMHG | HEART RATE: 78 BPM | OXYGEN SATURATION: 96 % | RESPIRATION RATE: 18 BRPM | DIASTOLIC BLOOD PRESSURE: 60 MMHG

## 2022-09-30 DIAGNOSIS — Z00.00 PHYSICAL EXAM, ANNUAL: Primary | ICD-10-CM

## 2022-09-30 DIAGNOSIS — I10 HYPERTENSION, UNSPECIFIED TYPE: ICD-10-CM

## 2022-09-30 DIAGNOSIS — I69.354 HEMIPLEGIA AND HEMIPARESIS FOLLOWING CEREBRAL INFARCTION AFFECTING LEFT NON-DOMINANT SIDE: ICD-10-CM

## 2022-09-30 PROCEDURE — 90694 VACC AIIV4 NO PRSRV 0.5ML IM: CPT | Mod: S$GLB,,, | Performed by: STUDENT IN AN ORGANIZED HEALTH CARE EDUCATION/TRAINING PROGRAM

## 2022-09-30 PROCEDURE — 1126F AMNT PAIN NOTED NONE PRSNT: CPT | Mod: CPTII,S$GLB,, | Performed by: STUDENT IN AN ORGANIZED HEALTH CARE EDUCATION/TRAINING PROGRAM

## 2022-09-30 PROCEDURE — 99999 PR PBB SHADOW E&M-EST. PATIENT-LVL IV: ICD-10-PCS | Mod: PBBFAC,,, | Performed by: STUDENT IN AN ORGANIZED HEALTH CARE EDUCATION/TRAINING PROGRAM

## 2022-09-30 PROCEDURE — 99499 RISK ADDL DX/OHS AUDIT: ICD-10-PCS | Mod: HCNC,S$GLB,, | Performed by: STUDENT IN AN ORGANIZED HEALTH CARE EDUCATION/TRAINING PROGRAM

## 2022-09-30 PROCEDURE — 1101F PR PT FALLS ASSESS DOC 0-1 FALLS W/OUT INJ PAST YR: ICD-10-PCS | Mod: CPTII,S$GLB,, | Performed by: STUDENT IN AN ORGANIZED HEALTH CARE EDUCATION/TRAINING PROGRAM

## 2022-09-30 PROCEDURE — 3288F FALL RISK ASSESSMENT DOCD: CPT | Mod: CPTII,S$GLB,, | Performed by: STUDENT IN AN ORGANIZED HEALTH CARE EDUCATION/TRAINING PROGRAM

## 2022-09-30 PROCEDURE — 99499 UNLISTED E&M SERVICE: CPT | Mod: HCNC,S$GLB,, | Performed by: STUDENT IN AN ORGANIZED HEALTH CARE EDUCATION/TRAINING PROGRAM

## 2022-09-30 PROCEDURE — 1126F PR PAIN SEVERITY QUANTIFIED, NO PAIN PRESENT: ICD-10-PCS | Mod: CPTII,S$GLB,, | Performed by: STUDENT IN AN ORGANIZED HEALTH CARE EDUCATION/TRAINING PROGRAM

## 2022-09-30 PROCEDURE — G0008 FLU VACCINE - QUADRIVALENT - ADJUVANTED: ICD-10-PCS | Mod: S$GLB,,, | Performed by: STUDENT IN AN ORGANIZED HEALTH CARE EDUCATION/TRAINING PROGRAM

## 2022-09-30 PROCEDURE — 1101F PT FALLS ASSESS-DOCD LE1/YR: CPT | Mod: CPTII,S$GLB,, | Performed by: STUDENT IN AN ORGANIZED HEALTH CARE EDUCATION/TRAINING PROGRAM

## 2022-09-30 PROCEDURE — 3288F PR FALLS RISK ASSESSMENT DOCUMENTED: ICD-10-PCS | Mod: CPTII,S$GLB,, | Performed by: STUDENT IN AN ORGANIZED HEALTH CARE EDUCATION/TRAINING PROGRAM

## 2022-09-30 PROCEDURE — 1157F ADVNC CARE PLAN IN RCRD: CPT | Mod: CPTII,S$GLB,, | Performed by: STUDENT IN AN ORGANIZED HEALTH CARE EDUCATION/TRAINING PROGRAM

## 2022-09-30 PROCEDURE — 1157F PR ADVANCE CARE PLAN OR EQUIV PRESENT IN MEDICAL RECORD: ICD-10-PCS | Mod: CPTII,S$GLB,, | Performed by: STUDENT IN AN ORGANIZED HEALTH CARE EDUCATION/TRAINING PROGRAM

## 2022-09-30 PROCEDURE — 99999 PR PBB SHADOW E&M-EST. PATIENT-LVL IV: CPT | Mod: PBBFAC,,, | Performed by: STUDENT IN AN ORGANIZED HEALTH CARE EDUCATION/TRAINING PROGRAM

## 2022-09-30 PROCEDURE — 99397 PR PREVENTIVE VISIT,EST,65 & OVER: ICD-10-PCS | Mod: 25,S$GLB,, | Performed by: STUDENT IN AN ORGANIZED HEALTH CARE EDUCATION/TRAINING PROGRAM

## 2022-09-30 PROCEDURE — 90694 FLU VACCINE - QUADRIVALENT - ADJUVANTED: ICD-10-PCS | Mod: S$GLB,,, | Performed by: STUDENT IN AN ORGANIZED HEALTH CARE EDUCATION/TRAINING PROGRAM

## 2022-09-30 PROCEDURE — G0008 ADMIN INFLUENZA VIRUS VAC: HCPCS | Mod: S$GLB,,, | Performed by: STUDENT IN AN ORGANIZED HEALTH CARE EDUCATION/TRAINING PROGRAM

## 2022-09-30 PROCEDURE — 1159F PR MEDICATION LIST DOCUMENTED IN MEDICAL RECORD: ICD-10-PCS | Mod: CPTII,S$GLB,, | Performed by: STUDENT IN AN ORGANIZED HEALTH CARE EDUCATION/TRAINING PROGRAM

## 2022-09-30 PROCEDURE — 99397 PER PM REEVAL EST PAT 65+ YR: CPT | Mod: 25,S$GLB,, | Performed by: STUDENT IN AN ORGANIZED HEALTH CARE EDUCATION/TRAINING PROGRAM

## 2022-09-30 PROCEDURE — 1159F MED LIST DOCD IN RCRD: CPT | Mod: CPTII,S$GLB,, | Performed by: STUDENT IN AN ORGANIZED HEALTH CARE EDUCATION/TRAINING PROGRAM

## 2022-09-30 RX ORDER — ERGOCALCIFEROL 1.25 MG/1
50000 CAPSULE ORAL
Qty: 12 CAPSULE | Refills: 3 | Status: SHIPPED | OUTPATIENT
Start: 2022-09-30

## 2022-09-30 RX ORDER — METOPROLOL SUCCINATE 25 MG/1
50 TABLET, EXTENDED RELEASE ORAL DAILY
Qty: 180 TABLET | Refills: 3 | Status: SHIPPED | OUTPATIENT
Start: 2022-09-30 | End: 2023-08-28

## 2022-09-30 NOTE — PROGRESS NOTES
Subjective:     Chief Complaint: Annual Exam, Flu Vaccine, and Medication Refill    HPI  Ms. Samantha Balderrama is a 89-year-old with history of right MCA embolic CVA, a number of ophthalmological diagnoses, history of both squamous cell carcinoma and basal cell carcinoma, paroxysmal AFib, hyperlipidemia, aortic atherosclerosis, history of bilateral breast cancer with secondary lung cancer, hypovitaminosis D, fatty liver disease, diffuse osteoarthritis in setting of osteoporosis, seasonal allergies, and obstructive sleep apnea presenting for annual physical; Rule between last appointment and now, she has been formally diagnosed with obstructive sleep apnea and prescribed CPAP machine in addition to establishing with pulmonology in the aftermath of a hospitalization for hemoptysis  (secondary to pleural effusion on Eliquis for AFib and subsequently resolved ).     Annual screening labs gathered in preparation for this appointment:   - modest subclinical hypothyroidism  - moderate vitamin-D deficiency (15)   - cholesterol control is excellent   - CBC with negligible MCHC/ RDW abnormalities only   - A1c and CMP without abnormalities    HTN:  - persistently/consistently/asymptomatically elevated despite Met-Succinate 25 (primarily for rate controled)     Family, social, surgical Hx reviewed     Health Maintenance:   Due for vaccinations (Shingrix, COVID booster, and annual influenza) only.    Past Medical History:   Diagnosis Date    (HFpEF) heart failure with preserved ejection fraction 1/6/2022    Allergy     Basal cell carcinoma 2003    Dr. 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 SAQIB Nielsen/ right side of nose and  kali also.    Stroke 2/2/2021     Past Surgical History:   Procedure Laterality Date    BREAST SURGERY Right 1990    x2- Mastectomy    BREAST SURGERY Left     Mastectomy    CATARACT EXTRACTION Bilateral 2012    Dr. Crocker    EYE SURGERY      MASTECTOMY, RADICAL Left 1988     Family History   Problem Relation Age of Onset    Heart failure Mother     Hypertension Mother     Heart disease Mother     Heart failure Father     Heart disease Father     Heart failure Brother     Hypertension Brother     Heart disease Brother     Hypertension Sister     Hypertension Sister     Heart disease Sister     Cancer Maternal Aunt     Hypertension Maternal Uncle     Hypertension Paternal Aunt     Hypertension Paternal Uncle     Asthma Cousin     Cancer Cousin     Amblyopia Neg Hx     Blindness Neg Hx     Cataracts Neg Hx     Diabetes Neg Hx     Glaucoma Neg Hx     Macular degeneration Neg Hx     Retinal detachment Neg Hx     Strabismus Neg Hx     Stroke Neg Hx     Thyroid disease Neg Hx     Melanoma Neg Hx     Psoriasis Neg Hx     Lupus Neg Hx      Social History     Socioeconomic History    Marital status: Single    Years of education: 12 + 4 + 2    Highest education level: Master's degree (e.g., MA, MS, Patti, MEd, MSW, ANT)   Occupational History    Occupation:  Retired in 1980     Employer: [employer]   Tobacco Use    Smoking status: Never    Smokeless tobacco: Never   Substance and Sexual Activity    Alcohol use: Yes     Alcohol/week: 0.0 standard drinks     Comment: social    Drug use: No    Sexual activity: Not Currently   Social History Narrative    Lives in her own condominium by herself.     Review of patient's allergies indicates:  No Known Allergies  Samantha Balderrama had no medications administered during this visit.      Review of Systems      Objective:      Vitals:    09/30/22 1048   BP: (!) 150/60   Pulse: 78   Resp: 18   Temp: 98.1 °F (36.7 °C)      Physical Exam  Current Outpatient  Medications on File Prior to Visit   Medication Sig Dispense Refill    apixaban (ELIQUIS) 5 mg Tab Take 1 tablet (5 mg total) by mouth 2 (two) times daily. 60 tablet 11    atorvastatin (LIPITOR) 40 MG tablet TAKE 1 TABLET (40 MG TOTAL) BY MOUTH ONCE DAILY. 90 tablet 3    benzonatate (TESSALON) 200 MG capsule Take 1 capsule (200 mg total) by mouth 3 (three) times daily as needed for Cough. 30 capsule 1    salt moisturizing solution no1 (OCEAN ULTRA SALINE MIST) Mist 2 Pump by Nasal route 2 (two) times daily as needed. 90 mL 0    tamoxifen (NOLVADEX) 10 MG Tab tamoxifen Take 1 time per day No date recorded tablet 1 time per day No route recorded No set duration recorded No set duration amount recorded active 10 mg      [DISCONTINUED] metoprolol succinate (TOPROL-XL) 25 MG 24 hr tablet Take 1 tablet (25 mg total) by mouth once daily. 30 tablet 11    dexamethasone (DECADRON) 10 mg/mL injection       loratadine (CLARITIN) 10 mg tablet Take 1 tablet (10 mg total) by mouth daily as needed for Allergies. 30 tablet 0    VIT A/VIT C/VIT E/ZINC/COPPER (PRESERVISION AREDS ORAL) Take by mouth once daily.        No current facility-administered medications on file prior to visit.         Assessment:       1. Physical exam, annual    2. Hemiplegia and hemiparesis following cerebral infarction affecting left non-dominant side    3. Hypertension, unspecified type        Plan:       Physical exam, annual   - Annual labs discussed    - Annual Flu shot updated    Hemiplegia and hemiparesis following cerebral infarction affecting left non-dominant side   - doing very well with assistance of a rolling walker     Hypertension, unspecified type   - Increase Succinate to 50mg and reassess in one month     Other orders  -     ergocalciferol (ERGOCALCIFEROL) 50,000 unit Cap; Take 1 capsule (50,000 Units total) by mouth every 7 days.  Dispense: 12 capsule; Refill: 3  -     metoprolol succinate (TOPROL-XL) 25 MG 24 hr tablet; Take 2 tablets (50  mg total) by mouth once daily.  Dispense: 180 tablet; Refill: 3  -     Influenza - Quadrivalent (Adjuvanted)

## 2022-10-27 ENCOUNTER — OFFICE VISIT (OUTPATIENT)
Dept: INTERNAL MEDICINE | Facility: CLINIC | Age: 87
End: 2022-10-27
Payer: MEDICARE

## 2022-10-27 VITALS
BODY MASS INDEX: 29.9 KG/M2 | OXYGEN SATURATION: 96 % | HEIGHT: 58 IN | WEIGHT: 142.44 LBS | SYSTOLIC BLOOD PRESSURE: 126 MMHG | HEART RATE: 72 BPM | RESPIRATION RATE: 20 BRPM | DIASTOLIC BLOOD PRESSURE: 60 MMHG | TEMPERATURE: 98 F

## 2022-10-27 DIAGNOSIS — H91.90 HEARING LOSS, UNSPECIFIED HEARING LOSS TYPE, UNSPECIFIED LATERALITY: ICD-10-CM

## 2022-10-27 DIAGNOSIS — I10 HYPERTENSION, UNSPECIFIED TYPE: ICD-10-CM

## 2022-10-27 DIAGNOSIS — H54.7 DECREASED VISION: Primary | ICD-10-CM

## 2022-10-27 PROCEDURE — 1157F ADVNC CARE PLAN IN RCRD: CPT | Mod: CPTII,S$GLB,, | Performed by: STUDENT IN AN ORGANIZED HEALTH CARE EDUCATION/TRAINING PROGRAM

## 2022-10-27 PROCEDURE — 99214 PR OFFICE/OUTPT VISIT, EST, LEVL IV, 30-39 MIN: ICD-10-PCS | Mod: S$GLB,,, | Performed by: STUDENT IN AN ORGANIZED HEALTH CARE EDUCATION/TRAINING PROGRAM

## 2022-10-27 PROCEDURE — 1126F PR PAIN SEVERITY QUANTIFIED, NO PAIN PRESENT: ICD-10-PCS | Mod: CPTII,S$GLB,, | Performed by: STUDENT IN AN ORGANIZED HEALTH CARE EDUCATION/TRAINING PROGRAM

## 2022-10-27 PROCEDURE — 1126F AMNT PAIN NOTED NONE PRSNT: CPT | Mod: CPTII,S$GLB,, | Performed by: STUDENT IN AN ORGANIZED HEALTH CARE EDUCATION/TRAINING PROGRAM

## 2022-10-27 PROCEDURE — 1159F MED LIST DOCD IN RCRD: CPT | Mod: CPTII,S$GLB,, | Performed by: STUDENT IN AN ORGANIZED HEALTH CARE EDUCATION/TRAINING PROGRAM

## 2022-10-27 PROCEDURE — 1159F PR MEDICATION LIST DOCUMENTED IN MEDICAL RECORD: ICD-10-PCS | Mod: CPTII,S$GLB,, | Performed by: STUDENT IN AN ORGANIZED HEALTH CARE EDUCATION/TRAINING PROGRAM

## 2022-10-27 PROCEDURE — 99214 OFFICE O/P EST MOD 30 MIN: CPT | Mod: S$GLB,,, | Performed by: STUDENT IN AN ORGANIZED HEALTH CARE EDUCATION/TRAINING PROGRAM

## 2022-10-27 PROCEDURE — 1157F PR ADVANCE CARE PLAN OR EQUIV PRESENT IN MEDICAL RECORD: ICD-10-PCS | Mod: CPTII,S$GLB,, | Performed by: STUDENT IN AN ORGANIZED HEALTH CARE EDUCATION/TRAINING PROGRAM

## 2022-10-27 PROCEDURE — 99999 PR PBB SHADOW E&M-EST. PATIENT-LVL V: ICD-10-PCS | Mod: PBBFAC,,, | Performed by: STUDENT IN AN ORGANIZED HEALTH CARE EDUCATION/TRAINING PROGRAM

## 2022-10-27 PROCEDURE — 99999 PR PBB SHADOW E&M-EST. PATIENT-LVL V: CPT | Mod: PBBFAC,,, | Performed by: STUDENT IN AN ORGANIZED HEALTH CARE EDUCATION/TRAINING PROGRAM

## 2022-10-27 NOTE — PROGRESS NOTES
Subjective:      Chief Complaint: Hypertension (1mo f/u) and requesting referrals (Opthamology and ENT (hearing))    HPI  Ms. Samantha Balderrama is a 89-year-old with history of right MCA embolic CVA, a number of ophthalmological diagnoses, history of both squamous cell carcinoma and basal cell carcinoma, paroxysmal AFib, hyperlipidemia, aortic atherosclerosis, history of bilateral breast cancer with secondary lung cancer, hypovitaminosis D, fatty liver disease, diffuse osteoarthritis in setting of osteoporosis, seasonal allergies, and obstructive sleep apnea presenting for one-month hypertensive follow-up:    Hypertension:   - at last appointment, metoprolol succinate increased from 25-50 mg daily  - Blood pressure have paradoxically increased ( 170/68 as compared to 150/60) with negligible heart rate suppression ( 78-72 )    Hearing loss:  - longstanding; wears hearing aids, but thinks she could be doing better   - Specifically asking for repeat hearing testing     Macular degeneration:  - Requesting referral to reestablish with a more local Ophthalmologist       Review of Systems   Constitutional:  Negative for appetite change, chills and fever.   HENT:  Positive for hearing loss.    Eyes:  Positive for visual disturbance.   Respiratory:  Negative for cough, chest tightness and shortness of breath.    Cardiovascular:  Negative for chest pain, palpitations and leg swelling.   Gastrointestinal:  Negative for abdominal distention, abdominal pain, blood in stool, constipation, diarrhea, nausea and vomiting.   Endocrine: Negative.    Genitourinary:  Negative for difficulty urinating, dysuria, frequency and hematuria.   Musculoskeletal: Negative.    Integumentary:  Negative.   Neurological: Negative.    Psychiatric/Behavioral: Negative.         Objective:      Vitals:    10/27/22 1316   BP: 126/60   Pulse:    Resp:    Temp:       Physical Exam  Vitals reviewed.   Constitutional:       General: She is not in acute  distress.     Appearance: Normal appearance.   HENT:      Head: Normocephalic and atraumatic.      Comments: Facial features are symmetric      Nose: Nose normal. No congestion or rhinorrhea.      Mouth/Throat:      Mouth: Mucous membranes are moist.      Pharynx: Oropharynx is clear. No oropharyngeal exudate or posterior oropharyngeal erythema.   Eyes:      General: No scleral icterus.     Extraocular Movements: Extraocular movements intact.      Conjunctiva/sclera: Conjunctivae normal.   Cardiovascular:      Rate and Rhythm: Normal rate and regular rhythm.      Pulses: Normal pulses.      Heart sounds: Normal heart sounds.   Pulmonary:      Effort: Pulmonary effort is normal. No respiratory distress.      Breath sounds: Normal breath sounds.   Musculoskeletal:         General: No deformity or signs of injury. Normal range of motion.      Cervical back: Normal range of motion.      Comments: Gait normal    Skin:     General: Skin is warm and dry.      Findings: No rash.   Neurological:      General: No focal deficit present.      Mental Status: She is alert and oriented to person, place, and time. Mental status is at baseline.   Psychiatric:         Mood and Affect: Mood normal.         Behavior: Behavior normal.         Thought Content: Thought content normal.     Current Outpatient Medications on File Prior to Visit   Medication Sig Dispense Refill    apixaban (ELIQUIS) 5 mg Tab Take 1 tablet (5 mg total) by mouth 2 (two) times daily. 60 tablet 11    atorvastatin (LIPITOR) 40 MG tablet TAKE 1 TABLET (40 MG TOTAL) BY MOUTH ONCE DAILY. 90 tablet 3    ergocalciferol (ERGOCALCIFEROL) 50,000 unit Cap Take 1 capsule (50,000 Units total) by mouth every 7 days. 12 capsule 3    loratadine (CLARITIN) 10 mg tablet Take 1 tablet (10 mg total) by mouth daily as needed for Allergies. 30 tablet 0    metoprolol succinate (TOPROL-XL) 25 MG 24 hr tablet Take 2 tablets (50 mg total) by mouth once daily. 180 tablet 3    salt  moisturizing solution no1 (OCEAN ULTRA SALINE MIST) Mist 2 Pump by Nasal route 2 (two) times daily as needed. 90 mL 0    VIT A/VIT C/VIT E/ZINC/COPPER (PRESERVISION AREDS ORAL) Take by mouth once daily.       benzonatate (TESSALON) 200 MG capsule Take 1 capsule (200 mg total) by mouth 3 (three) times daily as needed for Cough. (Patient not taking: Reported on 10/27/2022) 30 capsule 1    dexamethasone (DECADRON) 10 mg/mL injection       tamoxifen (NOLVADEX) 10 MG Tab tamoxifen Take 1 time per day No date recorded tablet 1 time per day No route recorded No set duration recorded No set duration amount recorded active 10 mg       No current facility-administered medications on file prior to visit.         Assessment:       1. Decreased vision    2. Hearing loss, unspecified hearing loss type, unspecified laterality    3. Hypertension, unspecified type        Plan:       Decreased vision  -     Ambulatory referral/consult to Ophthalmology; Future; Expected date: 11/03/2022   - recommendations and interventions appreciated     Hearing loss, unspecified hearing loss type, unspecified laterality  -     Ambulatory referral/consult to Audiology; Future; Expected date: 11/03/2022   - recommendations and interventions appreciated     Hypertension, unspecified type    -  there is a significant discrepancy between in office in home values.  Patient's home blood pressure cuff was found to be both accurate and precise  ( thus, will utilize home values for assessment of regimen adequacy ).  Continue succinate 50 (25 b.I.d.)

## 2022-11-10 ENCOUNTER — CLINICAL SUPPORT (OUTPATIENT)
Dept: AUDIOLOGY | Facility: CLINIC | Age: 87
End: 2022-11-10
Payer: MEDICARE

## 2022-11-10 DIAGNOSIS — H90.3 SENSORINEURAL HEARING LOSS (SNHL) OF BOTH EARS: ICD-10-CM

## 2022-11-10 PROCEDURE — 99999 PR PBB SHADOW E&M-EST. PATIENT-LVL I: CPT | Mod: PBBFAC,,, | Performed by: AUDIOLOGIST

## 2022-11-10 PROCEDURE — 92567 TYMPANOMETRY: CPT | Mod: S$GLB,,, | Performed by: AUDIOLOGIST

## 2022-11-10 PROCEDURE — 92557 COMPREHENSIVE HEARING TEST: CPT | Mod: S$GLB,,, | Performed by: AUDIOLOGIST

## 2022-11-10 PROCEDURE — 92557 PR COMPREHENSIVE HEARING TEST: ICD-10-PCS | Mod: S$GLB,,, | Performed by: AUDIOLOGIST

## 2022-11-10 PROCEDURE — 99999 PR PBB SHADOW E&M-EST. PATIENT-LVL I: ICD-10-PCS | Mod: PBBFAC,,, | Performed by: AUDIOLOGIST

## 2022-11-10 PROCEDURE — 92567 PR TYMPA2METRY: ICD-10-PCS | Mod: S$GLB,,, | Performed by: AUDIOLOGIST

## 2022-11-10 NOTE — PROGRESS NOTES
Audiologic Evaluation 11/10/2022:       Samantha Balderrama, a 89 y.o. female, was seen today in the clinic for an audiologic evaluation.  Ms. Balderrama reported she feels like her hearing has worsened. She currently wears binaural hearing aids from [employer].  Ms. Sherman denied vertigo, otalgia, and tinnitus.     Tympanometry revealed Type A tympanogram in the right ear and Type A tympanogram in the left ear. Audiogram results revealed moderate sloping to profound sensorineural hearing loss in the right ear and moderate sloping to severe sensorineural hearing loss in the left ear.  Speech reception thresholds were noted at 60 dB in the right ear and 55 dB in the left ear.  Speech discrimination scores were 40% in the right ear and 48% in the left ear.    Recommendations:  Otologic evaluation  Follow-up with hearing aid audiologist for hearing aid adjustments  Annual audiogram  Hearing protection when in noise

## 2023-02-07 DIAGNOSIS — Z00.00 ENCOUNTER FOR MEDICARE ANNUAL WELLNESS EXAM: ICD-10-CM

## 2023-02-09 DIAGNOSIS — Z00.00 ENCOUNTER FOR MEDICARE ANNUAL WELLNESS EXAM: ICD-10-CM

## 2023-03-15 NOTE — PROGRESS NOTES
Cc: BASILIO    She remains adherent with apap 8-12cm, got new machine 2022. Occasional nose drying. Using dreamwear mask. No travel machine gotten.  W/PAP snoring resolved and sleep more consolidated. Daughter present today.   Bp stable    Avg 8.3h/n AHI 2.0, 90% tile 9.8cm.     /67 (BP Location: Left arm, Patient Position: Sitting, BP Method: Large (Automatic))   Pulse 81   Wt 65.8 kg (145 lb 1.6 oz)   BMI 30.33 kg/m²       Assessment:  BASILIO, moderate. Excellent adherence with PAP, benefits from therapy. AHI<5  HTN,s table    Plan:  APAP 8-12cm , DME [facility] supplies  Discussed control ofOSa  See pcp re HTN mgt/continue med

## 2023-03-16 ENCOUNTER — OFFICE VISIT (OUTPATIENT)
Dept: SLEEP MEDICINE | Facility: CLINIC | Age: 88
End: 2023-03-16
Payer: MEDICARE

## 2023-03-16 VITALS
WEIGHT: 145.13 LBS | HEART RATE: 81 BPM | SYSTOLIC BLOOD PRESSURE: 136 MMHG | DIASTOLIC BLOOD PRESSURE: 67 MMHG | BODY MASS INDEX: 30.33 KG/M2

## 2023-03-16 DIAGNOSIS — I10 HYPERTENSION, UNSPECIFIED TYPE: ICD-10-CM

## 2023-03-16 DIAGNOSIS — G47.33 OSA (OBSTRUCTIVE SLEEP APNEA): Primary | ICD-10-CM

## 2023-03-16 PROCEDURE — 3288F FALL RISK ASSESSMENT DOCD: CPT | Mod: CPTII,S$GLB,, | Performed by: NURSE PRACTITIONER

## 2023-03-16 PROCEDURE — 99214 OFFICE O/P EST MOD 30 MIN: CPT | Mod: S$GLB,,, | Performed by: NURSE PRACTITIONER

## 2023-03-16 PROCEDURE — 1159F PR MEDICATION LIST DOCUMENTED IN MEDICAL RECORD: ICD-10-PCS | Mod: CPTII,S$GLB,, | Performed by: NURSE PRACTITIONER

## 2023-03-16 PROCEDURE — 1126F PR PAIN SEVERITY QUANTIFIED, NO PAIN PRESENT: ICD-10-PCS | Mod: CPTII,S$GLB,, | Performed by: NURSE PRACTITIONER

## 2023-03-16 PROCEDURE — 1101F PT FALLS ASSESS-DOCD LE1/YR: CPT | Mod: CPTII,S$GLB,, | Performed by: NURSE PRACTITIONER

## 2023-03-16 PROCEDURE — 3288F PR FALLS RISK ASSESSMENT DOCUMENTED: ICD-10-PCS | Mod: CPTII,S$GLB,, | Performed by: NURSE PRACTITIONER

## 2023-03-16 PROCEDURE — 1157F PR ADVANCE CARE PLAN OR EQUIV PRESENT IN MEDICAL RECORD: ICD-10-PCS | Mod: CPTII,S$GLB,, | Performed by: NURSE PRACTITIONER

## 2023-03-16 PROCEDURE — 1157F ADVNC CARE PLAN IN RCRD: CPT | Mod: CPTII,S$GLB,, | Performed by: NURSE PRACTITIONER

## 2023-03-16 PROCEDURE — 1101F PR PT FALLS ASSESS DOC 0-1 FALLS W/OUT INJ PAST YR: ICD-10-PCS | Mod: CPTII,S$GLB,, | Performed by: NURSE PRACTITIONER

## 2023-03-16 PROCEDURE — 99214 PR OFFICE/OUTPT VISIT, EST, LEVL IV, 30-39 MIN: ICD-10-PCS | Mod: S$GLB,,, | Performed by: NURSE PRACTITIONER

## 2023-03-16 PROCEDURE — 99999 PR PBB SHADOW E&M-EST. PATIENT-LVL III: ICD-10-PCS | Mod: PBBFAC,HCNC,, | Performed by: NURSE PRACTITIONER

## 2023-03-16 PROCEDURE — 1159F MED LIST DOCD IN RCRD: CPT | Mod: CPTII,S$GLB,, | Performed by: NURSE PRACTITIONER

## 2023-03-16 PROCEDURE — 1126F AMNT PAIN NOTED NONE PRSNT: CPT | Mod: CPTII,S$GLB,, | Performed by: NURSE PRACTITIONER

## 2023-03-16 PROCEDURE — 99999 PR PBB SHADOW E&M-EST. PATIENT-LVL III: CPT | Mod: PBBFAC,HCNC,, | Performed by: NURSE PRACTITIONER

## 2023-05-25 ENCOUNTER — TELEPHONE (OUTPATIENT)
Dept: ELECTROPHYSIOLOGY | Facility: CLINIC | Age: 88
End: 2023-05-25
Payer: MEDICARE

## 2023-05-26 ENCOUNTER — TELEPHONE (OUTPATIENT)
Dept: ELECTROPHYSIOLOGY | Facility: CLINIC | Age: 88
End: 2023-05-26
Payer: MEDICARE

## 2023-07-17 ENCOUNTER — OFFICE VISIT (OUTPATIENT)
Dept: ELECTROPHYSIOLOGY | Facility: CLINIC | Age: 88
End: 2023-07-17
Payer: MEDICARE

## 2023-07-17 ENCOUNTER — HOSPITAL ENCOUNTER (OUTPATIENT)
Dept: CARDIOLOGY | Facility: CLINIC | Age: 88
Discharge: HOME | End: 2023-07-17
Payer: MEDICARE

## 2023-07-17 VITALS
BODY MASS INDEX: 30.73 KG/M2 | WEIGHT: 146.38 LBS | HEIGHT: 58 IN | HEART RATE: 87 BPM | DIASTOLIC BLOOD PRESSURE: 64 MMHG | SYSTOLIC BLOOD PRESSURE: 168 MMHG

## 2023-07-17 DIAGNOSIS — M81.0 AGE-RELATED OSTEOPOROSIS WITHOUT CURRENT PATHOLOGICAL FRACTURE: ICD-10-CM

## 2023-07-17 DIAGNOSIS — Z85.828 HISTORY OF SKIN CANCER: ICD-10-CM

## 2023-07-17 DIAGNOSIS — I10 PRIMARY HYPERTENSION: ICD-10-CM

## 2023-07-17 DIAGNOSIS — I48.0 PAROXYSMAL ATRIAL FIBRILLATION: Primary | ICD-10-CM

## 2023-07-17 DIAGNOSIS — M15.9 PRIMARY OSTEOARTHRITIS INVOLVING MULTIPLE JOINTS: ICD-10-CM

## 2023-07-17 DIAGNOSIS — I63.411 CEREBROVASCULAR ACCIDENT (CVA) DUE TO EMBOLISM OF RIGHT MIDDLE CEREBRAL ARTERY: ICD-10-CM

## 2023-07-17 DIAGNOSIS — Z85.3 HISTORY OF BILATERAL BREAST CANCER: ICD-10-CM

## 2023-07-17 DIAGNOSIS — E78.2 MIXED HYPERLIPIDEMIA: ICD-10-CM

## 2023-07-17 DIAGNOSIS — C34.90 ADENOCARCINOMA OF LUNG, UNSPECIFIED LATERALITY: ICD-10-CM

## 2023-07-17 DIAGNOSIS — K75.81 NASH (NONALCOHOLIC STEATOHEPATITIS): ICD-10-CM

## 2023-07-17 DIAGNOSIS — I63.411 EMBOLIC STROKE INVOLVING RIGHT MIDDLE CEREBRAL ARTERY: ICD-10-CM

## 2023-07-17 DIAGNOSIS — I49.8 OTHER SPECIFIED CARDIAC ARRHYTHMIAS: ICD-10-CM

## 2023-07-17 DIAGNOSIS — G47.33 OSA (OBSTRUCTIVE SLEEP APNEA): ICD-10-CM

## 2023-07-17 DIAGNOSIS — I70.0 ATHEROSCLEROSIS OF AORTA: ICD-10-CM

## 2023-07-17 DIAGNOSIS — C44.81 BASAL CELL CARCINOMA (BCC) OF OVERLAPPING SITES OF SKIN: ICD-10-CM

## 2023-07-17 DIAGNOSIS — E03.8 SUBCLINICAL HYPOTHYROIDISM: ICD-10-CM

## 2023-07-17 DIAGNOSIS — Z79.01 CURRENT USE OF LONG TERM ANTICOAGULATION: ICD-10-CM

## 2023-07-17 DIAGNOSIS — Z85.89 HISTORY OF SECONDARY LUNG CANCER: ICD-10-CM

## 2023-07-17 DIAGNOSIS — E66.9 CLASS 1 OBESITY WITH BODY MASS INDEX (BMI) OF 30.0 TO 30.9 IN ADULT, UNSPECIFIED OBESITY TYPE, UNSPECIFIED WHETHER SERIOUS COMORBIDITY PRESENT: ICD-10-CM

## 2023-07-17 PROBLEM — I50.30 (HFPEF) HEART FAILURE WITH PRESERVED EJECTION FRACTION: Status: RESOLVED | Noted: 2022-01-06 | Resolved: 2023-07-17

## 2023-07-17 PROCEDURE — 1101F PT FALLS ASSESS-DOCD LE1/YR: CPT | Mod: HCNC,CPTII,S$GLB, | Performed by: STUDENT IN AN ORGANIZED HEALTH CARE EDUCATION/TRAINING PROGRAM

## 2023-07-17 PROCEDURE — 1157F PR ADVANCE CARE PLAN OR EQUIV PRESENT IN MEDICAL RECORD: ICD-10-PCS | Mod: HCNC,CPTII,S$GLB, | Performed by: STUDENT IN AN ORGANIZED HEALTH CARE EDUCATION/TRAINING PROGRAM

## 2023-07-17 PROCEDURE — 93010 ELECTROCARDIOGRAM REPORT: CPT | Mod: HCNC,S$GLB,, | Performed by: INTERNAL MEDICINE

## 2023-07-17 PROCEDURE — 93005 ELECTROCARDIOGRAM TRACING: CPT | Mod: HCNC,S$GLB,, | Performed by: STUDENT IN AN ORGANIZED HEALTH CARE EDUCATION/TRAINING PROGRAM

## 2023-07-17 PROCEDURE — 93005 RHYTHM STRIP: ICD-10-PCS | Mod: HCNC,S$GLB,, | Performed by: STUDENT IN AN ORGANIZED HEALTH CARE EDUCATION/TRAINING PROGRAM

## 2023-07-17 PROCEDURE — 99214 OFFICE O/P EST MOD 30 MIN: CPT | Mod: HCNC,S$GLB,, | Performed by: STUDENT IN AN ORGANIZED HEALTH CARE EDUCATION/TRAINING PROGRAM

## 2023-07-17 PROCEDURE — 1126F AMNT PAIN NOTED NONE PRSNT: CPT | Mod: HCNC,CPTII,S$GLB, | Performed by: STUDENT IN AN ORGANIZED HEALTH CARE EDUCATION/TRAINING PROGRAM

## 2023-07-17 PROCEDURE — 99999 PR PBB SHADOW E&M-EST. PATIENT-LVL II: CPT | Mod: PBBFAC,HCNC,, | Performed by: STUDENT IN AN ORGANIZED HEALTH CARE EDUCATION/TRAINING PROGRAM

## 2023-07-17 PROCEDURE — 1126F PR PAIN SEVERITY QUANTIFIED, NO PAIN PRESENT: ICD-10-PCS | Mod: HCNC,CPTII,S$GLB, | Performed by: STUDENT IN AN ORGANIZED HEALTH CARE EDUCATION/TRAINING PROGRAM

## 2023-07-17 PROCEDURE — 99214 PR OFFICE/OUTPT VISIT, EST, LEVL IV, 30-39 MIN: ICD-10-PCS | Mod: HCNC,S$GLB,, | Performed by: STUDENT IN AN ORGANIZED HEALTH CARE EDUCATION/TRAINING PROGRAM

## 2023-07-17 PROCEDURE — 99999 PR PBB SHADOW E&M-EST. PATIENT-LVL II: ICD-10-PCS | Mod: PBBFAC,HCNC,, | Performed by: STUDENT IN AN ORGANIZED HEALTH CARE EDUCATION/TRAINING PROGRAM

## 2023-07-17 PROCEDURE — 93010 RHYTHM STRIP: ICD-10-PCS | Mod: HCNC,S$GLB,, | Performed by: INTERNAL MEDICINE

## 2023-07-17 PROCEDURE — 1157F ADVNC CARE PLAN IN RCRD: CPT | Mod: HCNC,CPTII,S$GLB, | Performed by: STUDENT IN AN ORGANIZED HEALTH CARE EDUCATION/TRAINING PROGRAM

## 2023-07-17 PROCEDURE — 3288F PR FALLS RISK ASSESSMENT DOCUMENTED: ICD-10-PCS | Mod: HCNC,CPTII,S$GLB, | Performed by: STUDENT IN AN ORGANIZED HEALTH CARE EDUCATION/TRAINING PROGRAM

## 2023-07-17 PROCEDURE — 3288F FALL RISK ASSESSMENT DOCD: CPT | Mod: HCNC,CPTII,S$GLB, | Performed by: STUDENT IN AN ORGANIZED HEALTH CARE EDUCATION/TRAINING PROGRAM

## 2023-07-17 PROCEDURE — 1101F PR PT FALLS ASSESS DOC 0-1 FALLS W/OUT INJ PAST YR: ICD-10-PCS | Mod: HCNC,CPTII,S$GLB, | Performed by: STUDENT IN AN ORGANIZED HEALTH CARE EDUCATION/TRAINING PROGRAM

## 2023-07-17 NOTE — PROGRESS NOTES
"Electrophysiology Clinic Note    Reason for follow-up patient visit: Ongoing evaluation and recommendations following a prior stroke with paroxysmal atrial fibrillation.     PRESENTING HISTORY:     History of Present Illness:  Ms. Samantha Balderrama is a joan 89-year-old woman who returns to clinic today for ongoing evaluation and recommendations following a prior stroke with paroxysmal atrial fibrillation noted on ambulatory event monitoring. She has a past medical history significant for paroxysmal atrial fibrillation, a prior stroke of the right middle cerebral artery 2/2/2021, aortic atherosclerosis, mild-to-moderate aortic stenosis, adenocarcinoma of the lung, bilateral breast cancer s/p bilateral mastectomy, obstructive sleep apnea maintained on CPAP, hypertension, hyperlipidemia, basal cell carcinoma of the forehead and nose, osteoarthritis, Drusen retinal degeneration, and non-alcoholic fatty liver.     In brief review of her previous stroke, she reports that she was in her baseline level of physical health until the evening of 2/1/2021 when she reported symptoms of slurred speech and word searching. She attributed these symptoms to "being tired", and did not present for medical attention until 2/2/2021. Alteplase not administered due to being outside of the treatment window. A CT at that admission did not evidence an acute intracranial process, with a CTA head showing hypoattenuation within the right frontal lobe and complete occlusion to the superior branch of the right MCA with good collateral blood flow, and irregular plaque proximal to the common carotid artery and high grade stenosis of both vessels. A subsequent MRI confirmed an acute infarction to the right frontal lobe with superior branch R MCA occlusion. She underwent evaluation with a TTE that showed preserved systolic function, LVEF 65%, and severe left atrial enlargement. She was discharged home with home health, PT, OT, and speech therapy. " "She then remained on aspirin and Plavix, and denied any adverse bleeding events. A 30-day event monitor was obtained and revealed a new diagnosis of paroxysmal atrial fibrillation. Her anticoagulation regimen was then modified to include cessation of the ASA/Plavix and initiation of apixaban 5mg po BID therapy. She has been compliant with this medication and denies any bleeding episodes while on oral anticoagulation. She has only been taking essential medications, including her apixaban, metoprolol succinate, and her atorvastatin, as she wishes to avoid "taking too many pills".     She presents to clinic today with her daughter. She presently lives with her younger sister, who is waiting in the waiting room. Both the patient and her daughter feel that she has continued to make significant progress following her stroke, and she feels "back to normal". She reports that she is back to her baseline in terms of speech, with very mild word searching. She denies any motor or sensory deficits. She tells me that she is feeling overall quite well. She denies any episodes of dizziness, lightheadedness, syncope/presyncope, palpitations, chest pain or chest discomfort, shortness of breath or dyspnea on exertions, nausea or vomiting, orthopnea, lower extremity edema, or PND. She reports continued bilateral knee arthritis which limits her ability to walk for long distances, and she ambulates with a straight cane or her rolling walker outside of the house. She continues to perform her exercises that she learned with PT. She continues to monitor her home blood pressure readings and tells me that they are often at goal at home, and slightly elevated in the office.     Review of Systems:  Review of Systems   Constitutional:  Negative for activity change.   HENT:  Positive for hearing loss. Negative for nasal congestion, nosebleeds, postnasal drip, rhinorrhea, sinus pressure/congestion, sneezing and sore throat.    Respiratory:  " Positive for shortness of breath. Negative for apnea, cough, chest tightness and wheezing.    Cardiovascular:  Negative for chest pain, palpitations and leg swelling.   Gastrointestinal:  Negative for abdominal distention, abdominal pain, blood in stool, change in bowel habit, constipation, diarrhea, nausea, vomiting and change in bowel habit.   Genitourinary:  Positive for bladder incontinence. Negative for dysuria and hematuria.   Musculoskeletal:  Positive for arthralgias and back pain. Negative for gait problem.        Ambulates with a straight cane or rolling walker outside of the house.    Neurological:  Negative for dizziness, seizures, syncope, weakness, light-headedness, headaches, coordination difficulties and coordination difficulties.        Mild word searching; reports this continues to improve following her prior stroke.       PAST HISTORY:     Past Medical History:   Diagnosis Date    (HFpEF) heart failure with preserved ejection fraction 1/6/2022    Allergy     Basal cell carcinoma 2003    Dr. Sol Nielsen Drusen (degenerative) of retina 11/14/2013    Embolic stroke involving right middle cerebral artery 2/2/2021    Fatty liver 1/13/9    CT chest    Hypertension 9/30/2022    Lung cancer 1990    spread from breast cancer    Mixed hyperlipidemia 2/2/2021    Paroxysmal atrial fibrillation 6/15/2021    Squamous cell carcinoma 2003    Dr. SAQIB Nielsen/ right side of nose and forhead also.    Stroke 2/2/2021       Past Surgical History:   Procedure Laterality Date    BREAST SURGERY Right 1990    x2- Mastectomy    BREAST SURGERY Left     Mastectomy    CATARACT EXTRACTION Bilateral 2012    Dr. Crocker    EYE SURGERY      MASTECTOMY, RADICAL Left 1988       Family History:  Family History   Problem Relation Age of Onset    Heart failure Mother     Hypertension Mother     Heart disease Mother     Heart failure Father     Heart disease Father     Heart failure Brother     Hypertension Brother     Heart disease  Brother     Hypertension Sister     Hypertension Sister     Heart disease Sister     Cancer Maternal Aunt     Hypertension Maternal Uncle     Hypertension Paternal Aunt     Hypertension Paternal Uncle     Asthma Cousin     Cancer Cousin     Amblyopia Neg Hx     Blindness Neg Hx     Cataracts Neg Hx     Diabetes Neg Hx     Glaucoma Neg Hx     Macular degeneration Neg Hx     Retinal detachment Neg Hx     Strabismus Neg Hx     Stroke Neg Hx     Thyroid disease Neg Hx     Melanoma Neg Hx     Psoriasis Neg Hx     Lupus Neg Hx        Social History:  She  reports that she has never smoked. She has never used smokeless tobacco. She reports current alcohol use. She reports that she does not use drugs.      MEDICATIONS & ALLERGIES:     Review of patient's allergies indicates:  No Known Allergies    Current Outpatient Medications on File Prior to Visit   Medication Sig Dispense Refill    atorvastatin (LIPITOR) 40 MG tablet TAKE 1 TABLET (40 MG TOTAL) BY MOUTH ONCE DAILY. 90 tablet 3    benzonatate (TESSALON) 200 MG capsule Take 1 capsule (200 mg total) by mouth 3 (three) times daily as needed for Cough. (Patient not taking: Reported on 10/27/2022) 30 capsule 1    dexamethasone (DECADRON) 10 mg/mL injection       ELIQUIS 5 mg Tab TAKE 1 TABLET (5 MG TOTAL) BY MOUTH 2 (TWO) TIMES DAILY. 60 tablet 11    ergocalciferol (ERGOCALCIFEROL) 50,000 unit Cap Take 1 capsule (50,000 Units total) by mouth every 7 days. 12 capsule 3    loratadine (CLARITIN) 10 mg tablet Take 1 tablet (10 mg total) by mouth daily as needed for Allergies. 30 tablet 0    metoprolol succinate (TOPROL-XL) 25 MG 24 hr tablet Take 2 tablets (50 mg total) by mouth once daily. 180 tablet 3    salt moisturizing solution no1 (OCEAN ULTRA SALINE MIST) Mist 2 Pump by Nasal route 2 (two) times daily as needed. 90 mL 0    tamoxifen (NOLVADEX) 10 MG Tab tamoxifen Take 1 time per day No date recorded tablet 1 time per day No route recorded No set duration recorded No set  "duration amount recorded active 10 mg      VIT A/VIT C/VIT E/ZINC/COPPER (PRESERVISION AREDS ORAL) Take by mouth once daily.        No current facility-administered medications on file prior to visit.        OBJECTIVE:     Vital Signs:  BP (!) 168/64   Pulse 87   Ht 4' 10" (1.473 m)   Wt 66.4 kg (146 lb 6.2 oz)   BMI 30.59 kg/m²     Physical Exam:  Physical Exam  Constitutional:       General: She is not in acute distress.     Appearance: Normal appearance. She is not ill-appearing or diaphoretic.      Comments: Well-appearing elderly woman in NAD, ambulating with a rolling walker.    HENT:      Head: Normocephalic and atraumatic.      Comments: Hard of hearing.      Nose: Nose normal.      Mouth/Throat:      Mouth: Mucous membranes are moist.      Pharynx: Oropharynx is clear.   Eyes:      Pupils: Pupils are equal, round, and reactive to light.   Cardiovascular:      Rate and Rhythm: Normal rate and regular rhythm.      Pulses: Normal pulses.      Heart sounds: Murmur heard.     No friction rub. No gallop.      Comments: II/VI OTILIA best appreciated at the RUSB with radiation to the carotids.  Pulmonary:      Effort: Pulmonary effort is normal. No respiratory distress.      Breath sounds: Normal breath sounds. No wheezing, rhonchi or rales.   Chest:      Chest wall: No tenderness.   Abdominal:      General: There is no distension.      Palpations: Abdomen is soft.      Tenderness: There is no abdominal tenderness.   Musculoskeletal:         General: No swelling or tenderness.      Cervical back: Normal range of motion.      Right lower leg: No edema.      Left lower leg: No edema.   Skin:     General: Skin is warm and dry.      Findings: No erythema, lesion or rash.   Neurological:      General: No focal deficit present.      Mental Status: She is alert and oriented to person, place, and time. Mental status is at baseline.      Motor: No weakness.      Gait: Gait normal.   Psychiatric:         Mood and Affect: " Mood normal.         Behavior: Behavior normal.      Laboratory Data:  Lab Results   Component Value Date    WBC 5.15 09/26/2022    HGB 12.8 09/26/2022    HCT 40.2 09/26/2022    MCV 87 09/26/2022     09/26/2022     Lab Results   Component Value Date     09/26/2022     09/26/2022    K 4.0 09/26/2022     09/26/2022    CO2 24 09/26/2022    BUN 17 09/26/2022    CREATININE 0.6 09/26/2022    CALCIUM 9.2 09/26/2022    MG 2.0 01/07/2022     Lab Results   Component Value Date    INR 1.0 02/03/2021    INR 0.9 02/02/2021     Pertinent Cardiac Data:  ECG: Normal sinus rhythm with rate of 87 bpm,  ms, QRS 72 ms, QT/QTc 378/454 ms, voltage criteria for LVH.    Resting 2D Transthoracic Echocardiogram - 2/3/2021:  The left ventricle is normal in size with concentric remodeling and normal systolic function. The estimated ejection fraction is 65%  Indeterminate left ventricular diastolic function.  Normal right ventricular size with normal right ventricular systolic function.  Severe left atrial enlargement.  There is trivial to mild calcific mitral stenosis with restriction of MV leaflts.The mean diastolic gradient across the mitral valve is 4 mmHg at a heart rate of 88 bpm.  Incomplete TR envelope. PA pressure cannot be estimated.  Normal central venous pressure (3 mmHg).  The mean diastolic gradient across the mitral valve is 4 mmHg at a heart rate of 88 bpm.  There is mild-to-moderate low flow aortic valve stenosis. Aortic valve area is 1.42 cm2; peak velocity is 1.99 m/s; mean gradient is 9 mmHg.  Trivial posterior pericardial effusion.    24-Day Ambulatory Event Monitor - 4/1/2021:   Asymptomatic paroxysmal atrial fibrillation. The average HR was 83 bpm. 4% PACs, <1% PVCs. There were a total of 32 AF events, total AF burden <1%. Longest episode 72 minutes. Average HR in  bpm. The patient reported no symptoms.    Resting 2D Transthoracic Echocardiogram - 1/6/2022:  The left ventricle is  normal in size with concentric remodeling and normal systolic function.  The estimated ejection fraction is 65%.  Indeterminate left ventricular diastolic function.  There is mild-to-moderate aortic valve stenosis. Aortic valve area is 1.16 cm2; peak velocity is 2.17 m/s; mean gradient is 13 mmHg. Low stroke volume of 32cc/m2, which may under-estimate MG/PV. Mild aortic regurgitation.  Mitral annular calcification is present with restricted mitral leaflets. Mean gradient 5mmHg across the mitral valve with heart rate of 90bpm.  Normal right ventricular size with normal right ventricular systolic function.  Normal central venous pressure (3 mmHg).  The estimated PA systolic pressure is 36 mmHg.  Moderate left atrial enlargement.      ASSESSMENT & PLAN:   Ms. Samantha Balderrama is a joan 89-year-old woman who returns to clinic today for ongoing evaluation and recommendations following a prior stroke with paroxysmal atrial fibrillation noted on ambulatory event monitoring. She has a past medical history significant for paroxysmal atrial fibrillation, a prior stroke of the right middle cerebral artery 2/2/2021, aortic atherosclerosis, mild-to-moderate aortic stenosis, adenocarcinoma of the lung, bilateral breast cancer s/p bilateral mastectomy, obstructive sleep apnea maintained on CPAP, hypertension, hyperlipidemia, basal cell carcinoma of the forehead and nose, osteoarthritis, Drusen retinal degeneration, and non-alcoholic fatty liver. She remains in normal sinus rhythm today. Her prior CVA was likely cardioembolic in nature in the setting of her atrial fibrillation diagnosis.     - We discussed the pathophysiology of atrial fibrillation; specifically, we discussed paroxysmal atrial fibrillation and the concept that some patients may experience paroxysms of atrial fibrillation interrupting periods of sinus rhythm. We discussed that even a relatively low burden of atrial fibrillation continues to have an increased  risk of CVA.   - She remains on rate control with metoprolol succinate 50mg po daily, with no reported symptoms of palpitations. Her prior ambulatory monitor prior to Bb initiation revealed several periods of atrial fibrillation with RVR - average HR of 103 bpm while in atrial fibrillation. Despite her episodes of AF with RVR, her overall burden of atrial fibrillation remains very low at <1%.   - Her [id number] is 6 (HTN, prior CVA, female gender, age > 75), portending an annual adjusted risk of CVA of 9.8%. She remains on uninterrupted apixaban 5mg po BID therapy with no bleeding events reported.  - We discussed the possibility of rhythm control with initiation of an antiarrhythmic agent. As she is having atrial fibrillation so infrequently, this is not required at this time. She voices understanding, and we will continue her rate control strategy at this time.   - Possible underlying drivers of atrial fibrillation were addressed at this appointment, including recommendations for maintenance of a healthy BMI. Review of laboratory data revealed increased TSH at 5.249, with FT4 of 6.4, indicative of subclinical hypothyroidism. She continues to use her CPAP machine for ongoing treatment of her BASILIO.   - She will continue to follow with her PCP and neurology for ongoing surveillance and treatment of her comorbid conditions.     This patient will return to clinic with me in six months with instructions to call my office to schedule sooner in the event she develops recurrent symptoms of palpitations. All questions and concerns were addressed at this encounter.     Signing Physician:       ANDREIA Mitchell MD  Electrophysiology Attending

## 2023-08-27 NOTE — TELEPHONE ENCOUNTER
No care due was identified.  [facility] Embedded Care Due Messages. Reference number: [id number].   8/27/2023 2:31:10 PM CDT

## 2023-08-28 RX ORDER — METOPROLOL SUCCINATE 25 MG/1
50 TABLET, EXTENDED RELEASE ORAL
Qty: 180 TABLET | Refills: 0 | Status: SHIPPED | OUTPATIENT
Start: 2023-08-28

## 2023-08-28 NOTE — TELEPHONE ENCOUNTER
Refill Routing Note     Refill Routing Note   Medication(s) are not appropriate for processing by [facility] for the following reason(s):      Required vitals abnormal    [facility] action(s):  Defer Care Due:  None identified            Appointments  past 12m or future 3m with PCP    Date Provider   Last Visit   10/27/2022 Grover Bardales MD   Next Visit   10/16/2023 Grover Bardales MD   ED visits in past 90 days: 0        Note composed:4:57 AM 08/28/2023

## 2023-10-16 ENCOUNTER — OFFICE VISIT (OUTPATIENT)
Dept: INTERNAL MEDICINE | Facility: CLINIC | Age: 88
End: 2023-10-16
Payer: MEDICARE

## 2023-10-16 VITALS
OXYGEN SATURATION: 95 % | BODY MASS INDEX: 28.84 KG/M2 | RESPIRATION RATE: 19 BRPM | HEART RATE: 65 BPM | WEIGHT: 143.06 LBS | SYSTOLIC BLOOD PRESSURE: 130 MMHG | DIASTOLIC BLOOD PRESSURE: 80 MMHG | HEIGHT: 59 IN | TEMPERATURE: 98 F

## 2023-10-16 DIAGNOSIS — R73.01 ABNORMAL FASTING GLUCOSE: ICD-10-CM

## 2023-10-16 DIAGNOSIS — I69.354 HEMIPLEGIA AND HEMIPARESIS FOLLOWING CEREBRAL INFARCTION AFFECTING LEFT NON-DOMINANT SIDE: ICD-10-CM

## 2023-10-16 DIAGNOSIS — E78.2 MIXED HYPERLIPIDEMIA: ICD-10-CM

## 2023-10-16 DIAGNOSIS — Z91.81 AT HIGH RISK FOR INJURY RELATED TO FALL: ICD-10-CM

## 2023-10-16 DIAGNOSIS — Z00.00 PHYSICAL EXAM, ANNUAL: Primary | ICD-10-CM

## 2023-10-16 DIAGNOSIS — I10 HYPERTENSION, UNSPECIFIED TYPE: ICD-10-CM

## 2023-10-16 DIAGNOSIS — E55.9 HYPOVITAMINOSIS D: Chronic | ICD-10-CM

## 2023-10-16 PROCEDURE — 1101F PR PT FALLS ASSESS DOC 0-1 FALLS W/OUT INJ PAST YR: ICD-10-PCS | Mod: HCNC,CPTII,S$GLB, | Performed by: STUDENT IN AN ORGANIZED HEALTH CARE EDUCATION/TRAINING PROGRAM

## 2023-10-16 PROCEDURE — 99397 PER PM REEVAL EST PAT 65+ YR: CPT | Mod: HCNC,S$GLB,, | Performed by: STUDENT IN AN ORGANIZED HEALTH CARE EDUCATION/TRAINING PROGRAM

## 2023-10-16 PROCEDURE — 90694 VACC AIIV4 NO PRSRV 0.5ML IM: CPT | Mod: HCNC,S$GLB,, | Performed by: STUDENT IN AN ORGANIZED HEALTH CARE EDUCATION/TRAINING PROGRAM

## 2023-10-16 PROCEDURE — G0008 ADMIN INFLUENZA VIRUS VAC: HCPCS | Mod: HCNC,S$GLB,, | Performed by: STUDENT IN AN ORGANIZED HEALTH CARE EDUCATION/TRAINING PROGRAM

## 2023-10-16 PROCEDURE — 1126F AMNT PAIN NOTED NONE PRSNT: CPT | Mod: HCNC,CPTII,S$GLB, | Performed by: STUDENT IN AN ORGANIZED HEALTH CARE EDUCATION/TRAINING PROGRAM

## 2023-10-16 PROCEDURE — 99999 PR PBB SHADOW E&M-EST. PATIENT-LVL III: CPT | Mod: PBBFAC,HCNC,, | Performed by: STUDENT IN AN ORGANIZED HEALTH CARE EDUCATION/TRAINING PROGRAM

## 2023-10-16 PROCEDURE — 3288F FALL RISK ASSESSMENT DOCD: CPT | Mod: HCNC,CPTII,S$GLB, | Performed by: STUDENT IN AN ORGANIZED HEALTH CARE EDUCATION/TRAINING PROGRAM

## 2023-10-16 PROCEDURE — 3288F PR FALLS RISK ASSESSMENT DOCUMENTED: ICD-10-PCS | Mod: HCNC,CPTII,S$GLB, | Performed by: STUDENT IN AN ORGANIZED HEALTH CARE EDUCATION/TRAINING PROGRAM

## 2023-10-16 PROCEDURE — 1126F PR PAIN SEVERITY QUANTIFIED, NO PAIN PRESENT: ICD-10-PCS | Mod: HCNC,CPTII,S$GLB, | Performed by: STUDENT IN AN ORGANIZED HEALTH CARE EDUCATION/TRAINING PROGRAM

## 2023-10-16 PROCEDURE — 1157F PR ADVANCE CARE PLAN OR EQUIV PRESENT IN MEDICAL RECORD: ICD-10-PCS | Mod: HCNC,CPTII,S$GLB, | Performed by: STUDENT IN AN ORGANIZED HEALTH CARE EDUCATION/TRAINING PROGRAM

## 2023-10-16 PROCEDURE — 1157F ADVNC CARE PLAN IN RCRD: CPT | Mod: HCNC,CPTII,S$GLB, | Performed by: STUDENT IN AN ORGANIZED HEALTH CARE EDUCATION/TRAINING PROGRAM

## 2023-10-16 PROCEDURE — G0008 FLU VACCINE - QUADRIVALENT - ADJUVANTED: ICD-10-PCS | Mod: HCNC,S$GLB,, | Performed by: STUDENT IN AN ORGANIZED HEALTH CARE EDUCATION/TRAINING PROGRAM

## 2023-10-16 PROCEDURE — 1101F PT FALLS ASSESS-DOCD LE1/YR: CPT | Mod: HCNC,CPTII,S$GLB, | Performed by: STUDENT IN AN ORGANIZED HEALTH CARE EDUCATION/TRAINING PROGRAM

## 2023-10-16 PROCEDURE — 99999 PR PBB SHADOW E&M-EST. PATIENT-LVL III: ICD-10-PCS | Mod: PBBFAC,HCNC,, | Performed by: STUDENT IN AN ORGANIZED HEALTH CARE EDUCATION/TRAINING PROGRAM

## 2023-10-16 PROCEDURE — 99397 PR PREVENTIVE VISIT,EST,65 & OVER: ICD-10-PCS | Mod: HCNC,S$GLB,, | Performed by: STUDENT IN AN ORGANIZED HEALTH CARE EDUCATION/TRAINING PROGRAM

## 2023-10-16 PROCEDURE — 90694 FLU VACCINE - QUADRIVALENT - ADJUVANTED: ICD-10-PCS | Mod: HCNC,S$GLB,, | Performed by: STUDENT IN AN ORGANIZED HEALTH CARE EDUCATION/TRAINING PROGRAM

## 2023-10-16 NOTE — PROGRESS NOTES
Subjective:      Chief Complaint: Follow-up    HPI  Ms. Samantha Balderrama is a 89-year-old with history of right MCA embolic CVA, a number of ophthalmological diagnoses, history of both squamous cell carcinoma and basal cell carcinoma, paroxysmal AFib, hyperlipidemia, aortic atherosclerosis, history of bilateral breast cancer with secondary lung cancer, hypovitaminosis D, fatty liver disease, diffuse osteoarthritis in setting of osteoporosis, seasonal allergies, and obstructive sleep apnea presenting for hypertensive follow-up:    Hypertension:  -at last appointment, metoprolol succinate 50 (25 b.i.d.) continue without modification due to significant discrepancy between in office and home blood pressures  -blood pressure is currently very well controlled    Fall:  -status post 1 recent mechanical fall with blunt/closed head trauma and without associated loss of consciousness or residual symptoms    Family, social, surgical Hx reviewed     Health Maintenance:  Due for annual screening labs and routine vaccinations    Past Medical History:   Diagnosis Date    (HFpEF) heart failure with preserved ejection fraction 1/6/2022    Allergy     Basal cell carcinoma 2003    Dr. 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 SAQIB Nielsen/ right side of nose and forhead also.    Stroke 2/2/2021     Past Surgical History:   Procedure Laterality Date    BREAST SURGERY Right 1990    x2- Mastectomy    BREAST SURGERY Left     Mastectomy    CATARACT EXTRACTION Bilateral 2012    Dr. Crocker    EYE SURGERY      MASTECTOMY, RADICAL Left 1988     Family History   Problem Relation Age of Onset    Heart failure Mother     Hypertension Mother     Heart  disease Mother     Heart failure Father     Heart disease Father     Heart failure Brother     Hypertension Brother     Heart disease Brother     Hypertension Sister     Hypertension Sister     Heart disease Sister     Cancer Maternal Aunt     Hypertension Maternal Uncle     Hypertension Paternal Aunt     Hypertension Paternal Uncle     Asthma Cousin     Cancer Cousin     Amblyopia Neg Hx     Blindness Neg Hx     Cataracts Neg Hx     Diabetes Neg Hx     Glaucoma Neg Hx     Macular degeneration Neg Hx     Retinal detachment Neg Hx     Strabismus Neg Hx     Stroke Neg Hx     Thyroid disease Neg Hx     Melanoma Neg Hx     Psoriasis Neg Hx     Lupus Neg Hx      Social History     Socioeconomic History    Marital status: Single    Years of education: 12 + 4 + 2    Highest education level: Master's degree (e.g., MA, MS, Patti, MEd, MSW, ANT)   Occupational History    Occupation:  Retired in 1980     Employer: [employer]   Tobacco Use    Smoking status: Never    Smokeless tobacco: Never   Substance and Sexual Activity    Alcohol use: Yes     Alcohol/week: 0.0 standard drinks of alcohol     Comment: social    Drug use: No    Sexual activity: Not Currently   Social History Narrative    Lives in her own condominium by herself.     Social Determinants of Health     Financial Resource Strain: Low Risk  (11/13/2018)    Overall Financial Resource Strain (CARDIA)     Difficulty of Paying Living Expenses: Not hard at all   Food Insecurity: No Food Insecurity (11/13/2018)    Hunger Vital Sign     Worried About Running Out of Food in the Last Year: Never true     Ran Out of Food in the Last Year: Never true   Transportation Needs: No Transportation Needs (11/13/2018)    PRAPARE - Transportation     Lack of Transportation (Medical): No     Lack of Transportation (Non-Medical): No   Physical Activity: Inactive (11/13/2018)    Exercise Vital Sign     Days of Exercise per Week: 0 days     Minutes of Exercise per  Session: 0 min   Stress: No Stress Concern Present (11/13/2018)    [employer] - Occupational Stress Questionnaire     Feeling of Stress : Not at all   Social Connections: Moderately Integrated (11/13/2018)    Social Connection and Isolation Panel [NHANES]     Frequency of Communication with Friends and Family: More than three times a week     Frequency of Social Gatherings with Friends and Family: More than three times a week     Attends Christianity Services: More than 4 times per year     Active Member of Clubs or Organizations: Yes     Attends Club or Organization Meetings: More than 4 times per year     Marital Status: Never      Review of patient's allergies indicates:  No Known Allergies  Samantha Balderrama had no medications administered during this visit.   Review of Systems   Constitutional:  Negative for appetite change, chills and fever.   HENT: Negative.     Respiratory:  Negative for cough, chest tightness and shortness of breath.    Cardiovascular:  Negative for chest pain, palpitations and leg swelling.   Gastrointestinal:  Negative for abdominal distention, abdominal pain, blood in stool, constipation, diarrhea, nausea and vomiting.   Endocrine: Negative.    Genitourinary:  Negative for difficulty urinating, dysuria, frequency and hematuria.   Musculoskeletal: Negative.    Integumentary:  Negative.   Neurological: Negative.    Psychiatric/Behavioral: Negative.           Objective:      Vitals:    10/16/23 1434   BP: 130/80   Pulse: 65   Resp: 19   Temp: 98.3 °F (36.8 °C)      Physical Exam  Vitals reviewed.   Constitutional:       General: She is not in acute distress.     Appearance: Normal appearance.   HENT:      Head: Normocephalic and atraumatic.        Comments: Facial features are symmetric   Small abrasion noted to mid forehead as above without developing ecchymoses or wound     Nose: Nose normal. No congestion or rhinorrhea.      Mouth/Throat:      Mouth:  Mucous membranes are moist.      Pharynx: Oropharynx is clear. No oropharyngeal exudate or posterior oropharyngeal erythema.   Eyes:      General: No scleral icterus.     Extraocular Movements: Extraocular movements intact.      Conjunctiva/sclera: Conjunctivae normal.   Cardiovascular:      Rate and Rhythm: Normal rate and regular rhythm.      Pulses: Normal pulses.      Heart sounds: Normal heart sounds.   Pulmonary:      Effort: Pulmonary effort is normal. No respiratory distress.      Breath sounds: Normal breath sounds.   Musculoskeletal:         General: No deformity or signs of injury. Normal range of motion.      Cervical back: Normal range of motion.      Comments: Gait normal    Skin:     General: Skin is warm and dry.      Findings: No rash.   Neurological:      General: No focal deficit present.      Mental Status: She is alert and oriented to person, place, and time. Mental status is at baseline.   Psychiatric:         Mood and Affect: Mood normal.         Behavior: Behavior normal.         Thought Content: Thought content normal.       Current Outpatient Medications on File Prior to Visit   Medication Sig Dispense Refill    ELIQUIS 5 mg Tab TAKE 1 TABLET (5 MG TOTAL) BY MOUTH 2 (TWO) TIMES DAILY. 60 tablet 11    metoprolol succinate (TOPROL-XL) 25 MG 24 hr tablet TAKE 2 TABLETS ONE TIME DAILY 180 tablet 0    salt moisturizing solution no1 (OCEAN ULTRA SALINE MIST) Mist 2 Pump by Nasal route 2 (two) times daily as needed. 90 mL 0    atorvastatin (LIPITOR) 40 MG tablet TAKE 1 TABLET (40 MG TOTAL) BY MOUTH ONCE DAILY. 90 tablet 3    benzonatate (TESSALON) 200 MG capsule Take 1 capsule (200 mg total) by mouth 3 (three) times daily as needed for Cough. (Patient not taking: Reported on 10/16/2023) 30 capsule 1    dexamethasone (DECADRON) 10 mg/mL injection       ergocalciferol (ERGOCALCIFEROL) 50,000 unit Cap Take 1 capsule (50,000 Units total) by mouth every 7 days. (Patient not taking: Reported on  10/16/2023) 12 capsule 3    loratadine (CLARITIN) 10 mg tablet Take 1 tablet (10 mg total) by mouth daily as needed for Allergies. 30 tablet 0    tamoxifen (NOLVADEX) 10 MG Tab tamoxifen Take 1 time per day No date recorded tablet 1 time per day No route recorded No set duration recorded No set duration amount recorded active 10 mg      VIT A/VIT C/VIT E/ZINC/COPPER (PRESERVISION AREDS ORAL) Take by mouth once daily.        No current facility-administered medications on file prior to visit.         Assessment:       1. Physical exam, annual    2. Hemiplegia and hemiparesis following cerebral infarction affecting left non-dominant side    3. Hypovitaminosis D    4. Hypertension, unspecified type    5. Mixed hyperlipidemia    6. Abnormal fasting glucose    7. At high risk for injury related to fall        Plan:       Physical exam, annual  -     CBC Auto Differential; Future; Expected date: 10/16/2023  -     Comprehensive Metabolic Panel; Future; Expected date: 10/16/2023  -     Hemoglobin A1C; Future; Expected date: 10/16/2023  -     Lipid Panel; Future; Expected date: 10/16/2023  -     T4; Future; Expected date: 10/16/2023  -     TSH; Future; Expected date: 10/16/2023  -     Vitamin D; Future; Expected date: 10/16/2023   - annual screening labs ordered    Hemiplegia and hemiparesis following cerebral infarction affecting left non-dominant side  At high risk for injury related to fall  -     Ambulatory referral/consult to Home Health; Future; Expected date: 10/17/2023   - high traumatic injury risk associated with markedly elevated statistical fall likelihood (in addition to recent objective fall with head trauma) discussed.  For this, I will facilitate home health PT (mobility limitations and transportation issues prevent formal outpatient physical therapy) to limit fall in associated morbidity/mortality risk; recommendations and interventions appreciated     Hypovitaminosis D  -     Vitamin D; Future; Expected  date: 10/16/2023    Hypertension, unspecified type  -     CBC Auto Differential; Future; Expected date: 10/16/2023  -     Comprehensive Metabolic Panel; Future; Expected date: 10/16/2023  -     T4; Future; Expected date: 10/16/2023  -     TSH; Future; Expected date: 10/16/2023    Mixed hyperlipidemia  -     Lipid Panel; Future; Expected date: 10/16/2023    Abnormal fasting glucose  -     Hemoglobin A1C; Future; Expected date: 10/16/2023    Return to clinic in one year for annual exam or sooner if dictated by labs or illness.

## 2023-10-18 PROCEDURE — G0180 MD CERTIFICATION HHA PATIENT: HCPCS | Mod: ,,, | Performed by: STUDENT IN AN ORGANIZED HEALTH CARE EDUCATION/TRAINING PROGRAM

## 2023-10-18 PROCEDURE — G0180 PR HOME HEALTH MD CERTIFICATION: ICD-10-PCS | Mod: ,,, | Performed by: STUDENT IN AN ORGANIZED HEALTH CARE EDUCATION/TRAINING PROGRAM

## 2023-10-23 ENCOUNTER — LAB VISIT (OUTPATIENT)
Dept: LAB | Facility: HOSPITAL | Age: 88
End: 2023-10-23
Attending: STUDENT IN AN ORGANIZED HEALTH CARE EDUCATION/TRAINING PROGRAM
Payer: MEDICARE

## 2023-10-23 DIAGNOSIS — E55.9 HYPOVITAMINOSIS D: Chronic | ICD-10-CM

## 2023-10-23 DIAGNOSIS — Z00.00 PHYSICAL EXAM, ANNUAL: ICD-10-CM

## 2023-10-23 DIAGNOSIS — R73.01 ABNORMAL FASTING GLUCOSE: ICD-10-CM

## 2023-10-23 DIAGNOSIS — I10 HYPERTENSION, UNSPECIFIED TYPE: ICD-10-CM

## 2023-10-23 DIAGNOSIS — E78.2 MIXED HYPERLIPIDEMIA: ICD-10-CM

## 2023-10-23 LAB
ALBUMIN SERPL BCP-MCNC: 3.5 G/DL (ref 3.5–5.2)
ALP SERPL-CCNC: 87 U/L (ref 55–135)
ALT SERPL W/O P-5'-P-CCNC: 12 U/L (ref 10–44)
ANION GAP SERPL CALC-SCNC: 9 MMOL/L (ref 8–16)
AST SERPL-CCNC: 18 U/L (ref 10–40)
BASOPHILS # BLD AUTO: 0.04 K/UL (ref 0–0.2)
BASOPHILS NFR BLD: 0.8 % (ref 0–1.9)
BILIRUB SERPL-MCNC: 0.9 MG/DL (ref 0.1–1)
BUN SERPL-MCNC: 16 MG/DL (ref 10–30)
CALCIUM SERPL-MCNC: 9.4 MG/DL (ref 8.7–10.5)
CHLORIDE SERPL-SCNC: 108 MMOL/L (ref 95–110)
CHOLEST SERPL-MCNC: 193 MG/DL (ref 120–199)
CHOLEST/HDLC SERPL: 4.5 {RATIO} (ref 2–5)
CO2 SERPL-SCNC: 22 MMOL/L (ref 23–29)
CREAT SERPL-MCNC: 0.6 MG/DL (ref 0.5–1.4)
DIFFERENTIAL METHOD: ABNORMAL
EOSINOPHIL # BLD AUTO: 0.1 K/UL (ref 0–0.5)
EOSINOPHIL NFR BLD: 2.2 % (ref 0–8)
ERYTHROCYTE [DISTWIDTH] IN BLOOD BY AUTOMATED COUNT: 14.8 % (ref 11.5–14.5)
EST. GFR  (NO RACE VARIABLE): >60 ML/MIN/1.73 M^2
GLUCOSE SERPL-MCNC: 99 MG/DL (ref 70–110)
HCT VFR BLD AUTO: 39.9 % (ref 37–48.5)
HDLC SERPL-MCNC: 43 MG/DL (ref 40–75)
HDLC SERPL: 22.3 % (ref 20–50)
HGB BLD-MCNC: 12.8 G/DL (ref 12–16)
IMM GRANULOCYTES # BLD AUTO: 0.01 K/UL (ref 0–0.04)
IMM GRANULOCYTES NFR BLD AUTO: 0.2 % (ref 0–0.5)
LDLC SERPL CALC-MCNC: 123.8 MG/DL (ref 63–159)
LYMPHOCYTES # BLD AUTO: 1.1 K/UL (ref 1–4.8)
LYMPHOCYTES NFR BLD: 21.7 % (ref 18–48)
MCH RBC QN AUTO: 27.7 PG (ref 27–31)
MCHC RBC AUTO-ENTMCNC: 32.1 G/DL (ref 32–36)
MCV RBC AUTO: 86 FL (ref 82–98)
MONOCYTES # BLD AUTO: 0.5 K/UL (ref 0.3–1)
MONOCYTES NFR BLD: 10.6 % (ref 4–15)
NEUTROPHILS # BLD AUTO: 3.2 K/UL (ref 1.8–7.7)
NEUTROPHILS NFR BLD: 64.5 % (ref 38–73)
NONHDLC SERPL-MCNC: 150 MG/DL
NRBC BLD-RTO: 0 /100 WBC
PLATELET # BLD AUTO: 179 K/UL (ref 150–450)
PMV BLD AUTO: 10.3 FL (ref 9.2–12.9)
POTASSIUM SERPL-SCNC: 4.3 MMOL/L (ref 3.5–5.1)
PROT SERPL-MCNC: 7.4 G/DL (ref 6–8.4)
RBC # BLD AUTO: 4.62 M/UL (ref 4–5.4)
SODIUM SERPL-SCNC: 139 MMOL/L (ref 136–145)
T4 FREE SERPL-MCNC: 0.93 NG/DL (ref 0.71–1.51)
T4 SERPL-MCNC: 6.5 UG/DL (ref 4.5–11.5)
TRIGL SERPL-MCNC: 131 MG/DL (ref 30–150)
TSH SERPL DL<=0.005 MIU/L-ACNC: 4.92 UIU/ML (ref 0.4–4)
WBC # BLD AUTO: 4.92 K/UL (ref 3.9–12.7)

## 2023-10-23 PROCEDURE — 84443 ASSAY THYROID STIM HORMONE: CPT | Mod: HCNC | Performed by: STUDENT IN AN ORGANIZED HEALTH CARE EDUCATION/TRAINING PROGRAM

## 2023-10-23 PROCEDURE — 80061 LIPID PANEL: CPT | Mod: HCNC | Performed by: STUDENT IN AN ORGANIZED HEALTH CARE EDUCATION/TRAINING PROGRAM

## 2023-10-23 PROCEDURE — 80053 COMPREHEN METABOLIC PANEL: CPT | Mod: HCNC | Performed by: STUDENT IN AN ORGANIZED HEALTH CARE EDUCATION/TRAINING PROGRAM

## 2023-10-23 PROCEDURE — 82306 VITAMIN D 25 HYDROXY: CPT | Mod: HCNC | Performed by: STUDENT IN AN ORGANIZED HEALTH CARE EDUCATION/TRAINING PROGRAM

## 2023-10-23 PROCEDURE — 83036 HEMOGLOBIN GLYCOSYLATED A1C: CPT | Mod: HCNC | Performed by: STUDENT IN AN ORGANIZED HEALTH CARE EDUCATION/TRAINING PROGRAM

## 2023-10-23 PROCEDURE — 85025 COMPLETE CBC W/AUTO DIFF WBC: CPT | Mod: HCNC | Performed by: STUDENT IN AN ORGANIZED HEALTH CARE EDUCATION/TRAINING PROGRAM

## 2023-10-23 PROCEDURE — 84436 ASSAY OF TOTAL THYROXINE: CPT | Mod: HCNC | Performed by: STUDENT IN AN ORGANIZED HEALTH CARE EDUCATION/TRAINING PROGRAM

## 2023-10-23 PROCEDURE — 36415 COLL VENOUS BLD VENIPUNCTURE: CPT | Mod: HCNC,PO | Performed by: STUDENT IN AN ORGANIZED HEALTH CARE EDUCATION/TRAINING PROGRAM

## 2023-10-23 PROCEDURE — 84439 ASSAY OF FREE THYROXINE: CPT | Mod: HCNC | Performed by: STUDENT IN AN ORGANIZED HEALTH CARE EDUCATION/TRAINING PROGRAM

## 2023-10-24 LAB
25(OH)D3+25(OH)D2 SERPL-MCNC: 13 NG/ML (ref 30–96)
ESTIMATED AVG GLUCOSE: 105 MG/DL (ref 68–131)
HBA1C MFR BLD: 5.3 % (ref 4–5.6)

## 2023-11-22 ENCOUNTER — EXTERNAL HOME HEALTH (OUTPATIENT)
Dept: HOME HEALTH SERVICES | Facility: HOSPITAL | Age: 88
End: 2023-11-22
Payer: MEDICARE

## 2023-11-25 ENCOUNTER — DOCUMENT SCAN (OUTPATIENT)
Dept: HOME HEALTH SERVICES | Facility: HOSPITAL | Age: 88
End: 2023-11-25
Payer: MEDICARE

## 2023-12-06 ENCOUNTER — TELEPHONE (OUTPATIENT)
Dept: INTERNAL MEDICINE | Facility: CLINIC | Age: 88
End: 2023-12-06
Payer: MEDICARE

## 2023-12-06 DIAGNOSIS — I69.354 HEMIPLEGIA AND HEMIPARESIS FOLLOWING CEREBRAL INFARCTION AFFECTING LEFT NON-DOMINANT SIDE: Primary | ICD-10-CM

## 2023-12-06 DIAGNOSIS — Z91.81 AT HIGH RISK FOR INJURY RELATED TO FALL: ICD-10-CM

## 2023-12-06 NOTE — TELEPHONE ENCOUNTER
----- Message from Jing Hicks sent at 12/6/2023  2:45 PM CST -----  Contact: [facility] [phone]  [email or web address]     Patient is calling for Medical Advice regarding:needs to set up more therapy     How long has patient had these symptoms:    Pharmacy name and phone#:    Would like response via ethorityhart:  no     Comments:  Pt was discharged from therapy and she is asking to set up more for Physical Therapy please advise

## 2023-12-28 RX ORDER — APIXABAN 5 MG/1
TABLET, FILM COATED ORAL
Qty: 60 TABLET | Refills: 11 | Status: SHIPPED | OUTPATIENT
Start: 2023-12-28

## 2024-01-04 DIAGNOSIS — I48.0 PAROXYSMAL ATRIAL FIBRILLATION: Primary | ICD-10-CM

## 2024-03-26 ENCOUNTER — HOSPITAL ENCOUNTER (OUTPATIENT)
Dept: CARDIOLOGY | Facility: CLINIC | Age: 89
Discharge: HOME | End: 2024-03-26
Payer: MEDICARE

## 2024-03-26 ENCOUNTER — OFFICE VISIT (OUTPATIENT)
Dept: ELECTROPHYSIOLOGY | Facility: CLINIC | Age: 89
End: 2024-03-26
Payer: MEDICARE

## 2024-03-26 VITALS
DIASTOLIC BLOOD PRESSURE: 58 MMHG | HEIGHT: 59 IN | HEART RATE: 81 BPM | BODY MASS INDEX: 28.67 KG/M2 | SYSTOLIC BLOOD PRESSURE: 121 MMHG | WEIGHT: 142.19 LBS

## 2024-03-26 DIAGNOSIS — I48.0 PAROXYSMAL ATRIAL FIBRILLATION: Primary | ICD-10-CM

## 2024-03-26 DIAGNOSIS — I10 PRIMARY HYPERTENSION: ICD-10-CM

## 2024-03-26 DIAGNOSIS — I63.411 CEREBROVASCULAR ACCIDENT (CVA) DUE TO EMBOLISM OF RIGHT MIDDLE CEREBRAL ARTERY: ICD-10-CM

## 2024-03-26 DIAGNOSIS — M15.9 PRIMARY OSTEOARTHRITIS INVOLVING MULTIPLE JOINTS: ICD-10-CM

## 2024-03-26 DIAGNOSIS — I70.0 ATHEROSCLEROSIS OF AORTA: ICD-10-CM

## 2024-03-26 DIAGNOSIS — G47.33 OSA (OBSTRUCTIVE SLEEP APNEA): ICD-10-CM

## 2024-03-26 DIAGNOSIS — C44.81 BASAL CELL CARCINOMA (BCC) OF OVERLAPPING SITES OF SKIN: ICD-10-CM

## 2024-03-26 DIAGNOSIS — Z85.89 HISTORY OF SECONDARY LUNG CANCER: ICD-10-CM

## 2024-03-26 DIAGNOSIS — Z85.828 HISTORY OF SKIN CANCER: ICD-10-CM

## 2024-03-26 DIAGNOSIS — I48.0 PAROXYSMAL ATRIAL FIBRILLATION: ICD-10-CM

## 2024-03-26 DIAGNOSIS — Z85.3 HISTORY OF BILATERAL BREAST CANCER: ICD-10-CM

## 2024-03-26 DIAGNOSIS — K75.81 NASH (NONALCOHOLIC STEATOHEPATITIS): ICD-10-CM

## 2024-03-26 DIAGNOSIS — C34.90 ADENOCARCINOMA OF LUNG, UNSPECIFIED LATERALITY: ICD-10-CM

## 2024-03-26 DIAGNOSIS — Z79.01 CURRENT USE OF LONG TERM ANTICOAGULATION: ICD-10-CM

## 2024-03-26 DIAGNOSIS — E78.2 MIXED HYPERLIPIDEMIA: ICD-10-CM

## 2024-03-26 DIAGNOSIS — I63.411 EMBOLIC STROKE INVOLVING RIGHT MIDDLE CEREBRAL ARTERY: ICD-10-CM

## 2024-03-26 DIAGNOSIS — E03.8 SUBCLINICAL HYPOTHYROIDISM: ICD-10-CM

## 2024-03-26 DIAGNOSIS — M81.0 AGE-RELATED OSTEOPOROSIS WITHOUT CURRENT PATHOLOGICAL FRACTURE: ICD-10-CM

## 2024-03-26 DIAGNOSIS — E66.3 OVERWEIGHT: ICD-10-CM

## 2024-03-26 LAB
OHS QRS DURATION: 84 MS
OHS QTC CALCULATION: 466 MS

## 2024-03-26 PROCEDURE — 1157F ADVNC CARE PLAN IN RCRD: CPT | Mod: HCNC,CPTII,S$GLB, | Performed by: STUDENT IN AN ORGANIZED HEALTH CARE EDUCATION/TRAINING PROGRAM

## 2024-03-26 PROCEDURE — 1126F AMNT PAIN NOTED NONE PRSNT: CPT | Mod: HCNC,CPTII,S$GLB, | Performed by: STUDENT IN AN ORGANIZED HEALTH CARE EDUCATION/TRAINING PROGRAM

## 2024-03-26 PROCEDURE — 1101F PT FALLS ASSESS-DOCD LE1/YR: CPT | Mod: HCNC,CPTII,S$GLB, | Performed by: STUDENT IN AN ORGANIZED HEALTH CARE EDUCATION/TRAINING PROGRAM

## 2024-03-26 PROCEDURE — 1159F MED LIST DOCD IN RCRD: CPT | Mod: HCNC,CPTII,S$GLB, | Performed by: STUDENT IN AN ORGANIZED HEALTH CARE EDUCATION/TRAINING PROGRAM

## 2024-03-26 PROCEDURE — 93005 ELECTROCARDIOGRAM TRACING: CPT | Mod: HCNC,S$GLB,, | Performed by: STUDENT IN AN ORGANIZED HEALTH CARE EDUCATION/TRAINING PROGRAM

## 2024-03-26 PROCEDURE — 3288F FALL RISK ASSESSMENT DOCD: CPT | Mod: HCNC,CPTII,S$GLB, | Performed by: STUDENT IN AN ORGANIZED HEALTH CARE EDUCATION/TRAINING PROGRAM

## 2024-03-26 PROCEDURE — 93010 ELECTROCARDIOGRAM REPORT: CPT | Mod: HCNC,S$GLB,, | Performed by: INTERNAL MEDICINE

## 2024-03-26 PROCEDURE — 99999 PR PBB SHADOW E&M-EST. PATIENT-LVL III: CPT | Mod: PBBFAC,HCNC,, | Performed by: STUDENT IN AN ORGANIZED HEALTH CARE EDUCATION/TRAINING PROGRAM

## 2024-03-26 PROCEDURE — 99214 OFFICE O/P EST MOD 30 MIN: CPT | Mod: HCNC,S$GLB,, | Performed by: STUDENT IN AN ORGANIZED HEALTH CARE EDUCATION/TRAINING PROGRAM

## 2024-03-26 NOTE — PROGRESS NOTES
"Electrophysiology Clinic Note    Reason for follow-up patient visit: Ongoing evaluation and recommendations following a prior stroke with paroxysmal atrial fibrillation.     PRESENTING HISTORY:     History of Present Illness:  Ms. Samantha Balderrama is a joan 89-year-old woman who returns to clinic today for ongoing evaluation and recommendations following a prior stroke with paroxysmal atrial fibrillation noted on ambulatory event monitoring. She has a past medical history significant for paroxysmal atrial fibrillation, a prior stroke of the right middle cerebral artery 2/2/2021, aortic atherosclerosis, mild-to-moderate aortic stenosis, adenocarcinoma of the lung, bilateral breast cancer s/p bilateral mastectomy, obstructive sleep apnea maintained on CPAP, hypertension, hyperlipidemia, basal cell carcinoma of the forehead and nose, osteoarthritis, Drusen retinal degeneration, and non-alcoholic fatty liver.     In brief review of her previous stroke, she reports that she was in her baseline level of physical health until the evening of 2/1/2021 when she reported symptoms of slurred speech and word searching. She attributed these symptoms to "being tired", and did not present for medical attention until 2/2/2021. Alteplase not administered due to being outside of the treatment window. A CT at that admission did not evidence an acute intracranial process, with a CTA head showing hypoattenuation within the right frontal lobe and complete occlusion to the superior branch of the right MCA with good collateral blood flow, and irregular plaque proximal to the common carotid artery and high grade stenosis of both vessels. A subsequent MRI confirmed an acute infarction to the right frontal lobe with superior branch R MCA occlusion. She underwent evaluation with a TTE that showed preserved systolic function, LVEF 65%, and severe left atrial enlargement. She was discharged home with home health, PT, OT, and speech therapy. " "She then remained on aspirin and Plavix, and denied any adverse bleeding events. A 30-day event monitor was obtained and revealed a new diagnosis of paroxysmal atrial fibrillation. Her anticoagulation regimen was then modified to include cessation of the ASA/Plavix and initiation of apixaban 5mg po BID therapy. She has been compliant with this medication and denies any bleeding episodes while on oral anticoagulation. She has only been taking essential medications, including her apixaban, metoprolol succinate, and her atorvastatin, as she wishes to avoid "taking too many pills".     She presents to clinic today with her daughter. She presently lives with her younger sister, who is waiting in the waiting room. Both the patient and her daughter feel that she has continued to make significant progress following her stroke, and she feels "back to normal". She reports that she is back to her baseline in terms of speech, with very mild word searching. She denies any motor or sensory deficits. She tells me that she is feeling overall quite well. She denies any episodes of dizziness, lightheadedness, syncope/presyncope, palpitations, chest pain or chest discomfort, shortness of breath or dyspnea on exertions, nausea or vomiting, orthopnea, lower extremity edema, or PND. She reports continued bilateral knee arthritis which limits her ability to walk for long distances, and she ambulates with a straight cane or her rolling walker outside of the house. She continues to perform her exercises that she learned with PT. She continues to monitor her home blood pressure readings and tells me that they are often at goal at home. She is anticipating undergoing a dental procedure. We discussed the possible need to hold her apixaban for 2 days prior to her anticipated tooth extraction, with resumption the evening of her procedure.     Review of Systems:  Review of Systems   Constitutional:  Negative for activity change.   HENT:  Positive " for hearing loss. Negative for nasal congestion, nosebleeds, postnasal drip, rhinorrhea, sinus pressure/congestion, sneezing and sore throat.    Respiratory:  Positive for shortness of breath. Negative for apnea, cough, chest tightness and wheezing.    Cardiovascular:  Negative for chest pain, palpitations and leg swelling.   Gastrointestinal:  Negative for abdominal distention, abdominal pain, blood in stool, change in bowel habit, constipation, diarrhea, nausea and vomiting.   Genitourinary:  Positive for bladder incontinence. Negative for dysuria and hematuria.   Musculoskeletal:  Positive for arthralgias and back pain. Negative for gait problem.        Ambulates with a straight cane or rolling walker outside of the house.    Neurological:  Negative for dizziness, seizures, syncope, weakness, light-headedness, headaches and coordination difficulties.        Mild word searching; reports this continues to improve following her prior stroke.         PAST HISTORY:     Past Medical History:   Diagnosis Date    (HFpEF) heart failure with preserved ejection fraction 1/6/2022    Allergy     Basal cell carcinoma 2003    Dr. 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 SAQIB Nielsen/ right side of nose and forhead also.    Stroke 2/2/2021       Past Surgical History:   Procedure Laterality Date    BREAST SURGERY Right 1990    x2- Mastectomy    BREAST SURGERY Left     Mastectomy    CATARACT EXTRACTION Bilateral 2012    Dr. Crocker    EYE SURGERY      MASTECTOMY, RADICAL Left 1988       Family History:  Family History   Problem Relation Age of Onset    Heart failure Mother     Hypertension Mother     Heart disease Mother     Heart  failure Father     Heart disease Father     Heart failure Brother     Hypertension Brother     Heart disease Brother     Hypertension Sister     Hypertension Sister     Heart disease Sister     Cancer Maternal Aunt     Hypertension Maternal Uncle     Hypertension Paternal Aunt     Hypertension Paternal Uncle     Asthma Cousin     Cancer Cousin     Amblyopia Neg Hx     Blindness Neg Hx     Cataracts Neg Hx     Diabetes Neg Hx     Glaucoma Neg Hx     Macular degeneration Neg Hx     Retinal detachment Neg Hx     Strabismus Neg Hx     Stroke Neg Hx     Thyroid disease Neg Hx     Melanoma Neg Hx     Psoriasis Neg Hx     Lupus Neg Hx        Social History:  She  reports that she has never smoked. She has never used smokeless tobacco. She reports current alcohol use. She reports that she does not use drugs.      MEDICATIONS & ALLERGIES:     Review of patient's allergies indicates:  No Known Allergies    Current Outpatient Medications on File Prior to Visit   Medication Sig Dispense Refill    atorvastatin (LIPITOR) 40 MG tablet TAKE 1 TABLET (40 MG TOTAL) BY MOUTH ONCE DAILY. 90 tablet 3    benzonatate (TESSALON) 200 MG capsule Take 1 capsule (200 mg total) by mouth 3 (three) times daily as needed for Cough. (Patient not taking: Reported on 10/16/2023) 30 capsule 1    dexamethasone (DECADRON) 10 mg/mL injection       ELIQUIS 5 mg Tab TAKE 1 TABLET (5 MG TOTAL) BY MOUTH 2 (TWO) TIMES DAILY. 60 tablet 11    ergocalciferol (ERGOCALCIFEROL) 50,000 unit Cap Take 1 capsule (50,000 Units total) by mouth every 7 days. (Patient not taking: Reported on 10/16/2023) 12 capsule 3    loratadine (CLARITIN) 10 mg tablet Take 1 tablet (10 mg total) by mouth daily as needed for Allergies. 30 tablet 0    metoprolol succinate (TOPROL-XL) 25 MG 24 hr tablet TAKE 2 TABLETS ONE TIME DAILY 180 tablet 0    salt moisturizing solution no1 (OCEAN ULTRA SALINE MIST) Mist 2 Pump by Nasal route 2 (two) times daily as needed. 90 mL 0    tamoxifen  "(NOLVADEX) 10 MG Tab tamoxifen Take 1 time per day No date recorded tablet 1 time per day No route recorded No set duration recorded No set duration amount recorded active 10 mg      VIT A/VIT C/VIT E/ZINC/COPPER (PRESERVISION AREDS ORAL) Take by mouth once daily.        No current facility-administered medications on file prior to visit.        OBJECTIVE:     Vital Signs:  BP (!) 121/58   Pulse 81   Ht 4' 11" (1.499 m)   Wt 64.5 kg (142 lb 3.2 oz)   BMI 28.72 kg/m²     Physical Exam:  Physical Exam  Constitutional:       General: She is not in acute distress.     Appearance: Normal appearance. She is not ill-appearing or diaphoretic.      Comments: Well-appearing elderly woman in NAD, ambulating with a rolling walker.    HENT:      Head: Normocephalic and atraumatic.      Comments: Hard of hearing.      Nose: Nose normal.      Mouth/Throat:      Mouth: Mucous membranes are moist.      Pharynx: Oropharynx is clear.   Eyes:      Pupils: Pupils are equal, round, and reactive to light.   Cardiovascular:      Rate and Rhythm: Normal rate and regular rhythm.      Pulses: Normal pulses.      Heart sounds: Murmur heard.      No friction rub. No gallop.      Comments: II/VI OTILIA best appreciated at the RUSB with radiation to the carotids.  Pulmonary:      Effort: Pulmonary effort is normal. No respiratory distress.      Breath sounds: Normal breath sounds. No wheezing, rhonchi or rales.   Chest:      Chest wall: No tenderness.   Abdominal:      General: There is no distension.      Palpations: Abdomen is soft.      Tenderness: There is no abdominal tenderness.   Musculoskeletal:         General: No swelling or tenderness.      Cervical back: Normal range of motion.      Right lower leg: No edema.      Left lower leg: No edema.   Skin:     General: Skin is warm and dry.      Findings: No erythema, lesion or rash.   Neurological:      General: No focal deficit present.      Mental Status: She is alert and oriented to " person, place, and time. Mental status is at baseline.      Motor: No weakness.      Gait: Gait normal.   Psychiatric:         Mood and Affect: Mood normal.         Behavior: Behavior normal.        Laboratory Data:  Lab Results   Component Value Date    WBC 4.92 10/23/2023    HGB 12.8 10/23/2023    HCT 39.9 10/23/2023    MCV 86 10/23/2023     10/23/2023     Lab Results   Component Value Date    GLU 99 10/23/2023     10/23/2023    K 4.3 10/23/2023     10/23/2023    CO2 22 (L) 10/23/2023    BUN 16 10/23/2023    CREATININE 0.6 10/23/2023    CALCIUM 9.4 10/23/2023    MG 2.0 01/07/2022     Lab Results   Component Value Date    INR 1.0 02/03/2021    INR 0.9 02/02/2021     Pertinent Cardiac Data:  ECG: Normal sinus rhythm with rate of 81 bpm,  ms, QRS 84 ms, QT/QTc 402/466 ms, voltage criteria for LVH, possible left atrial enlargement, nonspecific STT changes.    Resting 2D Transthoracic Echocardiogram - 2/3/2021:  The left ventricle is normal in size with concentric remodeling and normal systolic function. The estimated ejection fraction is 65%  Indeterminate left ventricular diastolic function.  Normal right ventricular size with normal right ventricular systolic function.  Severe left atrial enlargement.  There is trivial to mild calcific mitral stenosis with restriction of MV leaflts.The mean diastolic gradient across the mitral valve is 4 mmHg at a heart rate of 88 bpm.  Incomplete TR envelope. PA pressure cannot be estimated.  Normal central venous pressure (3 mmHg).  The mean diastolic gradient across the mitral valve is 4 mmHg at a heart rate of 88 bpm.  There is mild-to-moderate low flow aortic valve stenosis. Aortic valve area is 1.42 cm2; peak velocity is 1.99 m/s; mean gradient is 9 mmHg.  Trivial posterior pericardial effusion.    24-Day Ambulatory Event Monitor - 4/1/2021:   Asymptomatic paroxysmal atrial fibrillation. The average HR was 83 bpm. 4% PACs, <1% PVCs. There were a total  of 32 AF events, total AF burden <1%. Longest episode 72 minutes. Average HR in  bpm. The patient reported no symptoms.    Resting 2D Transthoracic Echocardiogram - 1/6/2022:  The left ventricle is normal in size with concentric remodeling and normal systolic function.  The estimated ejection fraction is 65%.  Indeterminate left ventricular diastolic function.  There is mild-to-moderate aortic valve stenosis. Aortic valve area is 1.16 cm2; peak velocity is 2.17 m/s; mean gradient is 13 mmHg. Low stroke volume of 32cc/m2, which may under-estimate MG/PV. Mild aortic regurgitation.  Mitral annular calcification is present with restricted mitral leaflets. Mean gradient 5mmHg across the mitral valve with heart rate of 90bpm.  Normal right ventricular size with normal right ventricular systolic function.  Normal central venous pressure (3 mmHg).  The estimated PA systolic pressure is 36 mmHg.  Moderate left atrial enlargement.      ASSESSMENT & PLAN:   Ms. Samantha Balderrama is a joan 89-year-old woman who returns to clinic today for ongoing evaluation and recommendations following a prior stroke with paroxysmal atrial fibrillation noted on ambulatory event monitoring. She has a past medical history significant for paroxysmal atrial fibrillation, a prior stroke of the right middle cerebral artery 2/2/2021, aortic atherosclerosis, mild-to-moderate aortic stenosis, adenocarcinoma of the lung, bilateral breast cancer s/p bilateral mastectomy, obstructive sleep apnea maintained on CPAP, hypertension, hyperlipidemia, basal cell carcinoma of the forehead and nose, osteoarthritis, Drusen retinal degeneration, and non-alcoholic fatty liver. She remains in normal sinus rhythm today. Her prior CVA was likely cardioembolic in nature in the setting of her atrial fibrillation diagnosis.     - We discussed the pathophysiology of atrial fibrillation; specifically, we discussed paroxysmal atrial fibrillation and the concept that  some patients may experience paroxysms of atrial fibrillation interrupting periods of sinus rhythm. We discussed that even a relatively low burden of atrial fibrillation continues to have an increased risk of CVA.   - She remains on rate control with metoprolol succinate 50mg po daily, with no reported symptoms of palpitations. Her prior ambulatory monitor prior to Bb initiation revealed several periods of atrial fibrillation with RVR - average HR of 103 bpm while in atrial fibrillation. Despite her episodes of AF with RVR, her overall burden of atrial fibrillation remains very low at <1%.   - Her [id number] is 6 (HTN, prior CVA, female gender, age greater than 75), portending an annual adjusted risk of CVA of 9.8%. She remains on uninterrupted apixaban 5mg po BID therapy with no bleeding events reported. She is anticipating undergoing a dental procedure. We discussed the possible need to hold her apixaban for 2 days prior to her anticipated tooth extraction, with resumption the evening of her procedure.   - We discussed the possibility of rhythm control with initiation of an antiarrhythmic agent. As she is having atrial fibrillation very infrequently, this is not required at this time. She voices understanding, and we will continue her rate control strategy at this time.   - Possible underlying drivers of atrial fibrillation were addressed at this appointment, including recommendations for maintenance of a healthy BMI. Review of laboratory data revealed increased TSH at 4.922, with FT4 of 6.5, indicative of subclinical hypothyroidism. She continues to use her CPAP machine for ongoing treatment of her BASILIO.   - She will continue to follow with her PCP and neurology for ongoing surveillance and treatment of her comorbid conditions.     This patient will return to clinic with me in six months with continued PCP and general cardiology visits in the interim. All questions and concerns were addressed at this encounter.      Signing Physician:       ANDREIA Mitchell MD  Electrophysiology Attending

## 2024-03-29 PROBLEM — E03.8 SUBCLINICAL HYPOTHYROIDISM: Status: ACTIVE | Noted: 2024-03-29

## 2024-04-13 ENCOUNTER — OFFICE VISIT (OUTPATIENT)
Dept: URGENT CARE | Facility: CLINIC | Age: 89
End: 2024-04-13
Payer: MEDICARE

## 2024-04-13 VITALS
OXYGEN SATURATION: 97 % | WEIGHT: 142 LBS | HEART RATE: 86 BPM | DIASTOLIC BLOOD PRESSURE: 68 MMHG | BODY MASS INDEX: 28.63 KG/M2 | SYSTOLIC BLOOD PRESSURE: 105 MMHG | RESPIRATION RATE: 18 BRPM | TEMPERATURE: 98 F | HEIGHT: 59 IN

## 2024-04-13 DIAGNOSIS — K02.9 TOOTH DECAY: ICD-10-CM

## 2024-04-13 DIAGNOSIS — L89.102 PRESSURE INJURY OF BACK, STAGE 2: ICD-10-CM

## 2024-04-13 PROCEDURE — 99214 OFFICE O/P EST MOD 30 MIN: CPT | Mod: S$GLB,,, | Performed by: SURGERY

## 2024-04-13 RX ORDER — AMOXICILLIN 875 MG/1
875 TABLET, FILM COATED ORAL 2 TIMES DAILY
Qty: 20 TABLET | Refills: 0 | Status: SHIPPED | OUTPATIENT
Start: 2024-04-13 | End: 2024-04-23

## 2024-04-13 RX ORDER — MUPIROCIN 20 MG/G
OINTMENT TOPICAL DAILY
Qty: 15 G | Refills: 0 | Status: SHIPPED | OUTPATIENT
Start: 2024-04-13

## 2024-04-13 NOTE — PROGRESS NOTES
"Subjective:      Patient ID: Samantha Balderrama is a 89 y.o. female.    Vitals:  height is 4' 11.02" (1.499 m) and weight is 64.4 kg (142 lb). Her tympanic temperature is 98.4 °F (36.9 °C). Her blood pressure is 105/68 and her pulse is 86. Her respiration is 18 and oxygen saturation is 97%.     Chief Complaint: Abrasion and Dental Pain    This is a 89 y.o. female who presents today with a chief complaint of sore on the back and left tooth pain. Patient states that she had blood on her shirt and they cleaned it up and put some ointment on it. Patient has a consultation with the dentist on Monday to talk about her tooth but at times she has a high level of pain.     Dental Pain   This is a new problem. The current episode started 1 to 4 weeks ago. The problem occurs constantly. The problem has been gradually worsening. She has tried nothing for the symptoms. The treatment provided no relief.       Skin:  Negative for erythema.      Objective:     Physical Exam   Constitutional: She is oriented to person, place, and time. She appears well-developed.   HENT:   Head: Normocephalic and atraumatic. Head is without abrasion, without contusion and without laceration.   Ears:   Right Ear: External ear normal.   Left Ear: External ear normal.   Nose: Nose normal.   Mouth/Throat: Oropharynx is clear and moist and mucous membranes are normal.       Eyes: Conjunctivae, EOM and lids are normal. Pupils are equal, round, and reactive to light.   Neck: Trachea normal and phonation normal. Neck supple.   Cardiovascular: Normal rate, regular rhythm and normal heart sounds.   Pulmonary/Chest: Effort normal and breath sounds normal. No stridor. No respiratory distress.   Musculoskeletal: Normal range of motion.         General: Normal range of motion.   Neurological: She is alert and oriented to person, place, and time.   Skin: Skin is warm, dry, intact and no rash. Capillary refill takes less than 2 seconds. No abrasion, No burn, No " bruising, No erythema and No ecchymosis        Psychiatric: Her speech is normal and behavior is normal. Judgment and thought content normal.   Nursing note and vitals reviewed.      Assessment:     1. Pressure injury of back, stage 2    2. Tooth decay        Plan:       Pressure injury of back, stage 2  -     amoxicillin (AMOXIL) 875 MG tablet; Take 1 tablet (875 mg total) by mouth 2 (two) times daily. for 10 days  Dispense: 20 tablet; Refill: 0  -     mupirocin (BACTROBAN) 2 % ointment; Apply topically once daily.  Dispense: 15 g; Refill: 0    Tooth decay    Ulcer cleansed with hibiclens and dressed with mupirocin and bandaid    Discussed wound care with . She will follow up with primary care for further wound care management if needed

## 2024-06-25 RX ORDER — METOPROLOL SUCCINATE 25 MG/1
50 TABLET, EXTENDED RELEASE ORAL
Qty: 180 TABLET | Refills: 1 | Status: SHIPPED | OUTPATIENT
Start: 2024-06-25

## 2024-06-25 NOTE — TELEPHONE ENCOUNTER
No care due was identified.  Health system Embedded Care Due Messages. Reference number: [id number].   6/25/2024 3:00:15 PM CDT

## 2024-06-26 NOTE — TELEPHONE ENCOUNTER
Refill Decision Note   Samantha Balderrama  is requesting a refill authorization.  Brief Assessment and Rationale for Refill:  Approve     Medication Therapy Plan:         Comments:     Note composed:7:11 PM 06/25/2024

## 2024-08-14 ENCOUNTER — TELEPHONE (OUTPATIENT)
Dept: INTERNAL MEDICINE | Facility: CLINIC | Age: 89
End: 2024-08-14
Payer: MEDICARE

## 2024-08-14 DIAGNOSIS — Z91.81 AT HIGH RISK FOR INJURY RELATED TO FALL: ICD-10-CM

## 2024-08-14 DIAGNOSIS — I69.354 HEMIPLEGIA AND HEMIPARESIS FOLLOWING CEREBRAL INFARCTION AFFECTING LEFT NON-DOMINANT SIDE: ICD-10-CM

## 2024-08-14 DIAGNOSIS — I63.411 EMBOLIC STROKE INVOLVING RIGHT MIDDLE CEREBRAL ARTERY: Primary | ICD-10-CM

## 2024-08-14 NOTE — TELEPHONE ENCOUNTER
Pts friend (andrew) contacted office , she is requesting a   Home health referral to assist with bathing the pt , she still gets around pretty good but is very Nervous to shower alone .

## 2024-08-14 NOTE — TELEPHONE ENCOUNTER
----- Message from Samanta Hahn sent at 8/14/2024 11:55 AM CDT -----  Contact: [phone] [place](friend)  [email or web address]     Patient is calling for Medical Advice regarding: Pt friend states Pt is nervous while taking a bath. Friend is curious if Home Health should be involved.    How long has patient had these symptoms:n/a    Pharmacy name and phone#:    [facility] a call back or [facility]: call back    Comments:    Please advise patient replies from provider may take up to 48 hours.

## 2024-08-15 ENCOUNTER — TELEPHONE (OUTPATIENT)
Dept: INTERNAL MEDICINE | Facility: CLINIC | Age: 89
End: 2024-08-15
Payer: MEDICARE

## 2024-08-20 PROCEDURE — G0180 MD CERTIFICATION HHA PATIENT: HCPCS | Mod: ,,, | Performed by: STUDENT IN AN ORGANIZED HEALTH CARE EDUCATION/TRAINING PROGRAM

## 2024-08-22 ENCOUNTER — TELEPHONE (OUTPATIENT)
Dept: INTERNAL MEDICINE | Facility: CLINIC | Age: 89
End: 2024-08-22
Payer: MEDICARE

## 2024-08-22 NOTE — TELEPHONE ENCOUNTER
I have contact 3 numbers on file with no answer , lvm on all 3 lines for someone to contact office back . Pt needs a face to face visit for home health before 9/17/24

## 2024-08-26 DIAGNOSIS — J98.01 COUGH DUE TO BRONCHOSPASM: ICD-10-CM

## 2024-08-26 RX ORDER — BENZONATATE 200 MG/1
200 CAPSULE ORAL 3 TIMES DAILY PRN
Qty: 30 CAPSULE | Refills: 1 | Status: SHIPPED | OUTPATIENT
Start: 2024-08-26

## 2024-09-09 DIAGNOSIS — J98.01 COUGH DUE TO BRONCHOSPASM: ICD-10-CM

## 2024-09-09 RX ORDER — BENZONATATE 200 MG/1
200 CAPSULE ORAL 3 TIMES DAILY PRN
Qty: 30 CAPSULE | Refills: 1 | OUTPATIENT
Start: 2024-09-09

## 2024-09-09 NOTE — TELEPHONE ENCOUNTER
I spoke to pt's caregiver, she was notified there is a refill available on Rx bensonatate. I called [facility], Rx request is being processed and pt will be able to  Rx soon. Pt have an appt on 9-12-24.

## 2024-09-17 ENCOUNTER — OFFICE VISIT (OUTPATIENT)
Dept: INTERNAL MEDICINE | Facility: CLINIC | Age: 89
End: 2024-09-17
Payer: MEDICARE

## 2024-09-17 VITALS
DIASTOLIC BLOOD PRESSURE: 68 MMHG | HEIGHT: 59 IN | RESPIRATION RATE: 18 BRPM | BODY MASS INDEX: 28.67 KG/M2 | TEMPERATURE: 98 F | OXYGEN SATURATION: 97 % | SYSTOLIC BLOOD PRESSURE: 136 MMHG | HEART RATE: 89 BPM | WEIGHT: 142.19 LBS

## 2024-09-17 DIAGNOSIS — I69.354 HEMIPLEGIA AND HEMIPARESIS FOLLOWING CEREBRAL INFARCTION AFFECTING LEFT NON-DOMINANT SIDE: Primary | ICD-10-CM

## 2024-09-17 DIAGNOSIS — J98.01 COUGH DUE TO BRONCHOSPASM: ICD-10-CM

## 2024-09-17 DIAGNOSIS — I48.0 PAROXYSMAL ATRIAL FIBRILLATION: ICD-10-CM

## 2024-09-17 DIAGNOSIS — G47.33 OSA (OBSTRUCTIVE SLEEP APNEA): Primary | ICD-10-CM

## 2024-09-17 DIAGNOSIS — G47.30 SLEEP APNEA TREATED WITH CONTINUOUS POSITIVE AIRWAY PRESSURE (CPAP): ICD-10-CM

## 2024-09-17 PROCEDURE — 1126F AMNT PAIN NOTED NONE PRSNT: CPT | Mod: HCNC,CPTII,GC,S$GLB

## 2024-09-17 PROCEDURE — 1101F PT FALLS ASSESS-DOCD LE1/YR: CPT | Mod: HCNC,CPTII,GC,S$GLB

## 2024-09-17 PROCEDURE — 99999 PR PBB SHADOW E&M-EST. PATIENT-LVL III: CPT | Mod: PBBFAC,HCNC,GC,

## 2024-09-17 PROCEDURE — 99214 OFFICE O/P EST MOD 30 MIN: CPT | Mod: HCNC,GC,S$GLB,

## 2024-09-17 PROCEDURE — 1157F ADVNC CARE PLAN IN RCRD: CPT | Mod: HCNC,CPTII,GC,S$GLB

## 2024-09-17 PROCEDURE — 3288F FALL RISK ASSESSMENT DOCD: CPT | Mod: HCNC,CPTII,GC,S$GLB

## 2024-09-17 RX ORDER — METOPROLOL SUCCINATE 25 MG/1
25 TABLET, EXTENDED RELEASE ORAL 2 TIMES DAILY
Qty: 180 TABLET | Refills: 1 | Status: SHIPPED | OUTPATIENT
Start: 2024-09-17

## 2024-09-17 RX ORDER — BENZONATATE 200 MG/1
200 CAPSULE ORAL 3 TIMES DAILY PRN
Qty: 30 CAPSULE | Refills: 1 | Status: SHIPPED | OUTPATIENT
Start: 2024-09-17

## 2024-09-17 RX ORDER — BENZONATATE 200 MG/1
200 CAPSULE ORAL 3 TIMES DAILY PRN
Qty: 30 CAPSULE | Refills: 1 | Status: CANCELLED | OUTPATIENT
Start: 2024-09-17

## 2024-09-17 NOTE — PROGRESS NOTES
[facility]  CLINIC NOTE    Patient Name: Samantha Balderrama  YOB: 1927    PRESENTING HISTORY       History of Present Illness:  Ms. Samantha Balderrama is a 89 y.o. female with right MCA embolic CVA, a number of ophthalmological diagnoses, history of both squamous cell carcinoma and basal cell carcinoma, paroxysmal AFib, hyperlipidemia, aortic atherosclerosis, history of bilateral breast cancer with secondary lung cancer, hypovitaminosis D, fatty liver disease, diffuse osteoarthritis in setting of osteoporosis, seasonal allergies, and obstructive sleep apnea presenting for home health referral to assist with bathing The patient also needs a new CPAP machine.     She currently denies any CP, SOB, n/v/d, abd pain. Otherwise doing well. Takes her meds every day. Will need refills Eliquis, Metoprolol, and benzonate.    She has been having difficulty with bathing especially after the stroke. Currently does have PT that comes and sees her, however she needs assistance.     Hypertension  History of HTN but she does not take any medications right now per her PCP.     Afib  Eliquis 5 mg BID, Metoprolol 25 mg BID      Review of Systems   Constitutional:  Negative for fever and malaise/fatigue.   Respiratory:  Negative for cough.    Cardiovascular:  Negative for chest pain and leg swelling.   Gastrointestinal:  Negative for abdominal pain, constipation, nausea and vomiting.   Genitourinary:  Negative for frequency.   Musculoskeletal:  Negative for back pain.   Neurological:  Negative for headaches.   Endo/Heme/Allergies:  Negative for environmental allergies.         PAST HISTORY:     Past Medical History:   Diagnosis Date    (HFpEF) heart failure with preserved ejection fraction 1/6/2022    Allergy     Basal cell carcinoma 2003    Dr. Sol Macias (degenerative) of retina 11/14/2013    Embolic stroke involving right middle cerebral artery 2/2/2021    Fatty liver 1/13/9    CT  chest    Hypertension 9/30/2022    Lung cancer 1990    spread from breast cancer    Mixed hyperlipidemia 2/2/2021    Paroxysmal atrial fibrillation 6/15/2021    Squamous cell carcinoma 2003    Dr. SAQIB Nielsen/ right side of nose and forhead also.    Stroke 2/2/2021    Subclinical hypothyroidism 3/29/2024       Past Surgical History:   Procedure Laterality Date    BREAST SURGERY Right 1990    x2- Mastectomy    BREAST SURGERY Left     Mastectomy    CATARACT EXTRACTION Bilateral 2012    Dr. Crocker    EYE SURGERY      MASTECTOMY, RADICAL Left 1988       Family History   Problem Relation Name Age of Onset    Heart failure Mother      Hypertension Mother      Heart disease Mother      Heart failure Father      Heart disease Father      Heart failure Brother      Hypertension Brother      Heart disease Brother      Hypertension Sister      Hypertension Sister      Heart disease Sister      Cancer Maternal Aunt      Hypertension Maternal Uncle      Hypertension Paternal Aunt      Hypertension Paternal Uncle      Asthma Cousin      Cancer Cousin      Amblyopia Neg Hx      Blindness Neg Hx      Cataracts Neg Hx      Diabetes Neg Hx      Glaucoma Neg Hx      Macular degeneration Neg Hx      Retinal detachment Neg Hx      Strabismus Neg Hx      Stroke Neg Hx      Thyroid disease Neg Hx      Melanoma Neg Hx      Psoriasis Neg Hx      Lupus Neg Hx         Social History     Socioeconomic History    Marital status: Single    Years of education: 12 + 4 + 2    Highest education level: Master's degree (e.g., MA, MS, Patti, MEd, MSW, ANT)   Occupational History    Occupation:  Retired in 1980     Employer: [employer]   Tobacco Use    Smoking status: Never    Smokeless tobacco: Never   Substance and Sexual Activity    Alcohol use: Yes     Alcohol/week: 0.0 standard drinks of alcohol     Comment: social    Drug use: No    Sexual activity: Not Currently   Social History Narrative    Lives in her own condominium by  herself.     Social Determinants of Health     Financial Resource Strain: Low Risk  (11/13/2018)    Overall Financial Resource Strain (CARDIA)     Difficulty of Paying Living Expenses: Not hard at all   Food Insecurity: No Food Insecurity (11/13/2018)    Hunger Vital Sign     Worried About Running Out of Food in the Last Year: Never true     Ran Out of Food in the Last Year: Never true   Transportation Needs: No Transportation Needs (11/13/2018)    PRAPARE - Transportation     Lack of Transportation (Medical): No     Lack of Transportation (Non-Medical): No   Physical Activity: Inactive (11/13/2018)    Exercise Vital Sign     Days of Exercise per Week: 0 days     Minutes of Exercise per Session: 0 min   Stress: No Stress Concern Present (11/13/2018)    [employer] - Occupational Stress Questionnaire     Feeling of Stress : Not at all       MEDICATIONS & ALLERGIES:     Current Outpatient Medications on File Prior to Visit   Medication Sig    atorvastatin (LIPITOR) 40 MG tablet TAKE 1 TABLET (40 MG TOTAL) BY MOUTH ONCE DAILY. (Patient not taking: Reported on 3/26/2024)    dexamethasone (DECADRON) 10 mg/mL injection     ergocalciferol (ERGOCALCIFEROL) 50,000 unit Cap Take 1 capsule (50,000 Units total) by mouth every 7 days. (Patient not taking: Reported on 10/16/2023)    loratadine (CLARITIN) 10 mg tablet Take 1 tablet (10 mg total) by mouth daily as needed for Allergies.    mupirocin (BACTROBAN) 2 % ointment Apply topically once daily.    salt moisturizing solution no1 (OCEAN ULTRA SALINE MIST) Mist 2 Pump by Nasal route 2 (two) times daily as needed.    tamoxifen (NOLVADEX) 10 MG Tab tamoxifen Take 1 time per day No date recorded tablet 1 time per day No route recorded No set duration recorded No set duration amount recorded active 10 mg    VIT A/VIT C/VIT E/ZINC/COPPER (PRESERVISION AREDS ORAL) Take by mouth once daily.     [DISCONTINUED] benzonatate (TESSALON) 200 MG capsule TAKE 1  "CAPSULE (200 MG TOTAL) BY MOUTH 3 (THREE) TIMES DAILY AS NEEDED FOR COUGH.    [DISCONTINUED] ELIQUIS 5 mg Tab TAKE 1 TABLET (5 MG TOTAL) BY MOUTH 2 (TWO) TIMES DAILY.    [DISCONTINUED] metoprolol succinate (TOPROL-XL) 25 MG 24 hr tablet TAKE 2 TABLETS ONE TIME DAILY     No current facility-administered medications on file prior to visit.       Review of patient's allergies indicates:  No Known Allergies    OBJECTIVE:   Vital Signs:  Vitals:    09/17/24 1420 09/17/24 1456   BP: (!) 150/72 136/68   Pulse: 89    Resp: 18    Temp: 97.6 °F (36.4 °C)    TempSrc: Temporal    SpO2: 97%    Weight: 64.5 kg (142 lb 3.2 oz)    Height: 4' 11" (1.499 m)        No results found for this or any previous visit (from the past 24 hour(s)).      Physical Exam  Vitals and nursing note reviewed.   Constitutional:       Appearance: Normal appearance.      Comments: Ambulates with a cane   HENT:      Head: Normocephalic and atraumatic.   Cardiovascular:      Rate and Rhythm: Normal rate.      Pulses: Normal pulses.   Pulmonary:      Effort: Pulmonary effort is normal.      Breath sounds: Normal breath sounds.   Abdominal:      General: Abdomen is flat. Bowel sounds are normal.      Palpations: Abdomen is soft.   Skin:     General: Skin is warm.      Capillary Refill: Capillary refill takes less than 2 seconds.   Neurological:      General: No focal deficit present.      Mental Status: She is alert and oriented to person, place, and time.      Motor: Weakness present.   Psychiatric:         Mood and Affect: Mood normal.           ASSESSMENT & PLAN:     1. Hemiplegia and hemiparesis following cerebral infarction affecting left non-dominant side, stable  Pt had a stroke back in 2021. She has been having decline in motor function. Family member noted that she's been having difficulty with bathing and is quite concerned. Believes she would really benefit from assistance.   - high traumatic injury risk associated with markedly elevated " statistical fall likelihood    - HH to continue under oversight of pt PCP, Dr. Bardales, staff reviewed w/ him    2. Cough due to bronchospasm, intermittent stable  -     benzonatate (TESSALON) 200 MG capsule; Take 1 capsule (200 mg total) by mouth 3 (three) times daily as needed for Cough.  Dispense: 30 capsule; Refill: 1    3. Paroxysmal atrial fibrillation, stable  -     metoprolol succinate (TOPROL-XL) 25 MG 24 hr tablet; Take 1 tablet (25 mg total) by mouth 2 (two) times daily.  Dispense: 180 tablet; Refill: 1  -     apixaban (ELIQUIS) 5 mg Tab; Take 1 tablet (5 mg total) by mouth 2 (two) times daily.  Dispense: 60 tablet; Refill: 5      4. Sleep apnea treated with continuous positive airway pressure (CPAP), stable  On CPAP for obstructive sleep apnea. Needs new machine as parts have worn out.   - Pulmonology referral      Discussed with Dr. Santamaria    RTC Follow up with Dr. Jeffy Pickens, DO  Internal Medicine PGY-1

## 2024-09-17 NOTE — PROGRESS NOTES
I have interviewed and examined the patient w/ the resident, Dr. Pickens  I agree w/ the impression and plan as outlined above.      Brittney Santamaria MD- Staff

## 2024-09-18 ENCOUNTER — TELEPHONE (OUTPATIENT)
Dept: SLEEP MEDICINE | Facility: CLINIC | Age: 89
End: 2024-09-18
Payer: MEDICARE

## 2024-09-18 NOTE — TELEPHONE ENCOUNTER
PATIENT INFORMED THEY WILL CALL DME        ----- Message from Elda Correa NP sent at 9/17/2024  4:55 PM CDT -----  Contact: [employer]  [employer] for supplies placed today. She can call see if DME can ship to her. Got new machine 2022.  ----- Message -----  From: Dianna Coates MA  Sent: 9/17/2024   3:47 PM CDT  To: Elda Correa NP    Appt in [place] dec 19  ----- Message -----  From: Fior Sanchez  Sent: 9/17/2024   3:36 PM CDT  To: Sunny LANG Staff    Type:  Patient Call          Who Called:Patient friend - [employer]         Does the patient know what this is regarding?: Requesting a call back pt is in need of supplies or a new machine ; please advise           Would the patient rather a call back or a response via [facility]?call           Best Call Back Number: [phone]             Additional Information:

## 2024-09-19 ENCOUNTER — EXTERNAL HOME HEALTH (OUTPATIENT)
Dept: HOME HEALTH SERVICES | Facility: HOSPITAL | Age: 89
End: 2024-09-19
Payer: MEDICARE

## 2024-10-07 ENCOUNTER — DOCUMENT SCAN (OUTPATIENT)
Dept: HOME HEALTH SERVICES | Facility: HOSPITAL | Age: 89
End: 2024-10-07
Payer: MEDICARE

## 2024-10-11 ENCOUNTER — DOCUMENT SCAN (OUTPATIENT)
Dept: HOME HEALTH SERVICES | Facility: HOSPITAL | Age: 89
End: 2024-10-11
Payer: MEDICARE

## 2024-10-14 DIAGNOSIS — J98.01 COUGH DUE TO BRONCHOSPASM: ICD-10-CM

## 2024-10-14 RX ORDER — BENZONATATE 200 MG/1
200 CAPSULE ORAL 3 TIMES DAILY PRN
Qty: 30 CAPSULE | Refills: 1 | Status: SHIPPED | OUTPATIENT
Start: 2024-10-14

## 2024-11-25 ENCOUNTER — TELEPHONE (OUTPATIENT)
Dept: INTERNAL MEDICINE | Facility: CLINIC | Age: 89
End: 2024-11-25
Payer: MEDICARE

## 2024-11-25 NOTE — TELEPHONE ENCOUNTER
----- Message from [employer] sent at 11/25/2024  1:09 PM CST -----  Contact: Ms. Rizvi Phone# [phone], or [phone] Pts number  Ms. Rizvi patients friend said that the patient had a fall on last week and now she's having trouble walking and back pain.     The system says for the patient to call [facility] [employer] [facility], Ms. Rizvi refused, and she would like for you to give her a call.

## 2024-11-25 NOTE — TELEPHONE ENCOUNTER
I spoke to pt's caregiver [employer], pt needs help with bathing. She was given contact number for 2 caregiver services- [facility] and [facility]

## 2024-11-25 NOTE — TELEPHONE ENCOUNTER
----- Message from Yobany Salcedo sent at 11/25/2024  3:01 PM CST -----  Contact: Ms. Rizvi Phone# [phone], or [phone] Pts number    ----- Message -----  From: Ofelia Barker  Sent: 11/25/2024   1:12 PM CST  To: Jeffy Ness Staff    Ms. Rizvi patients friend said that the patient had a fall on last week and now she's having trouble walking and back pain.     The system says for the patient to call [facility] [employer], Ms. Rizvi refused, and she would like for you to give her a call.

## 2024-11-26 ENCOUNTER — HOSPITAL ENCOUNTER (EMERGENCY)
Facility: HOSPITAL | Age: 89
Discharge: HOME | End: 2024-11-27
Attending: EMERGENCY MEDICINE
Payer: MEDICARE

## 2024-11-26 VITALS
BODY MASS INDEX: 32.25 KG/M2 | RESPIRATION RATE: 16 BRPM | WEIGHT: 160 LBS | TEMPERATURE: 98 F | SYSTOLIC BLOOD PRESSURE: 145 MMHG | HEIGHT: 59 IN | DIASTOLIC BLOOD PRESSURE: 89 MMHG | HEART RATE: 104 BPM | OXYGEN SATURATION: 97 %

## 2024-11-26 DIAGNOSIS — S32.001A CLOSED BURST FRACTURE OF LUMBAR VERTEBRA, INITIAL ENCOUNTER: Primary | ICD-10-CM

## 2024-11-26 PROCEDURE — 99284 EMERGENCY DEPT VISIT MOD MDM: CPT | Mod: 25

## 2024-11-26 PROCEDURE — 25000003 PHARM REV CODE 250: Performed by: EMERGENCY MEDICINE

## 2024-11-26 RX ORDER — LIDOCAINE 50 MG/G
1 PATCH TOPICAL DAILY
Qty: 14 PATCH | Refills: 0 | Status: SHIPPED | OUTPATIENT
Start: 2024-11-26

## 2024-11-26 RX ORDER — ACETAMINOPHEN 500 MG
1000 TABLET ORAL
Status: COMPLETED | OUTPATIENT
Start: 2024-11-26 | End: 2024-11-26

## 2024-11-26 RX ADMIN — ACETAMINOPHEN 1000 MG: 500 TABLET ORAL at 05:11

## 2024-11-26 NOTE — ED NOTES
Pt identifiers Samantha Aguilar and correct  Pt's family states pt was walking with her cane on Thursday when she slipped on the floor and fell backwards, hitting her buttocks, back, and head on the ground. Pt has been taking tylenol with no relief and pain to lumber region has progressively gotten worse. Pt denies LOC, weakness, dizziness, and N/V before and after incident. No bruising noted. Tenderness upon palpation noted to bilateral lumbar regions. No pain or deviation noted to spine.     LOC: The patient is awake, alert, aware of environment with an appropriate affect. Oriented x3, speaking appropriately  APPEARANCE: Pt resting comfortably, in no acute distress, pt is clean and well groomed, clothing properly fastened  SKIN: Skin warm, dry and intact, normal skin turgor, moist mucus membranes  RESPIRATORY: Airway is open and patent, respirations are spontaneous, even and unlabored, normal effort and rate  CARDIAC: Normal rate and rhythm, no peripheral edema noted, capillary refill < 3 seconds, bilateral radial pulses 2+  ABDOMEN: Soft, nontender, nondistended. Bowel sounds present   NEUROLOGIC: PERRL, facial expression is symmetrical, patient moving all extremities spontaneously, normal sensation in all extremities when touched with a finger.  Follows all commands appropriately  MUSCULOSKELETAL: Kyphosis noted

## 2024-11-26 NOTE — ED PROVIDER NOTES
Source of History:  Patient    Chief complaint:  Fall (Pt. Had fall 4 days ago. Denies head injury and LOC. Pt. C/o back soreness, pt. Is ambulatory. )      HPI:  Samantha Balderrama is a 89 y.o. female presenting with low back pain status post fall at home 5 days ago patient was walking across her house in her slippers and her walker, when her foot got caught up and she fell backwards landing on her buttocks and gently striking her head.  The majority of the trauma was to her buttocks.  Since that time she has been taking Tylenol around the clock and still has persistent pain of the lumbar region.  She has no associated symptoms and denies any numbness or tingling.    ROS: As per HPI and below:  General: No fever.  No chills.  Head: No headache.  No loss of consciousness or amnesia.  Neck: No neck pain.  Back:  As above.  Extremities: No extremity pain.  Chest: No shortness of breath.  No chest pain.  Cardiovascular: No palpitations.  Abdomen: No abdominal pain.  No nausea or vomiting.  Integument: No rashes or bruising.  Eyes: No visual changes.  Urinary: No hematuria.  Neurologic: No numbness.  No focal weakness.      Review of patient's allergies indicates:  No Known Allergies    No current facility-administered medications on file prior to encounter.     Current Outpatient Medications on File Prior to Encounter   Medication Sig Dispense Refill    apixaban (ELIQUIS) 5 mg Tab Take 1 tablet (5 mg total) by mouth 2 (two) times daily. 60 tablet 5    benzonatate (TESSALON) 200 MG capsule TAKE 1 CAPSULE (200 MG TOTAL) BY MOUTH 3 (THREE) TIMES DAILY AS NEEDED FOR COUGH. 30 capsule 1    metoprolol succinate (TOPROL-XL) 25 MG 24 hr tablet Take 1 tablet (25 mg total) by mouth 2 (two) times daily. 180 tablet 1    atorvastatin (LIPITOR) 40 MG tablet TAKE 1 TABLET (40 MG TOTAL) BY MOUTH ONCE DAILY. (Patient not taking: Reported on 3/26/2024) 90 tablet 3    dexamethasone (DECADRON) 10 mg/mL injection       ergocalciferol  (ERGOCALCIFEROL) 50,000 unit Cap Take 1 capsule (50,000 Units total) by mouth every 7 days. (Patient not taking: Reported on 10/16/2023) 12 capsule 3    loratadine (CLARITIN) 10 mg tablet Take 1 tablet (10 mg total) by mouth daily as needed for Allergies. 30 tablet 0    mupirocin (BACTROBAN) 2 % ointment Apply topically once daily. 15 g 0    salt moisturizing solution no1 (OCEAN ULTRA SALINE MIST) Mist 2 Pump by Nasal route 2 (two) times daily as needed. 90 mL 0    tamoxifen (NOLVADEX) 10 MG Tab tamoxifen Take 1 time per day No date recorded tablet 1 time per day No route recorded No set duration recorded No set duration amount recorded active 10 mg      VIT A/VIT C/VIT E/ZINC/COPPER (PRESERVISION AREDS ORAL) Take by mouth once daily.          PMH:  As per HPI and below:  Past Medical History:   Diagnosis Date    (HFpEF) heart failure with preserved ejection fraction 1/6/2022    Allergy     Basal cell carcinoma 2003    Dr. 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 SAQIB Nielsen/ right side of nose and forhead also.    Stroke 2/2/2021    Subclinical hypothyroidism 3/29/2024     Past Surgical History:   Procedure Laterality Date    BREAST SURGERY Right 1990    x2- Mastectomy    BREAST SURGERY Left     Mastectomy    CATARACT EXTRACTION Bilateral 2012    Dr. Crocker    EYE SURGERY      MASTECTOMY, RADICAL Left 1988       Social History     Socioeconomic History    Marital status: Single    Years of education: 12 + 4 + 2    Highest education level: Master's degree (e.g., MA, MS, Patti, MEd, MSW, ANT)   Occupational History    Occupation:  Retired in 1980     Employer: [employer]   Tobacco Use    Smoking  status: Never    Smokeless tobacco: Never   Substance and Sexual Activity    Alcohol use: Yes     Alcohol/week: 1.0 standard drink of alcohol     Types: 1 Shots of liquor per week     Comment: a martini a day    Drug use: No    Sexual activity: Not Currently   Social History Narrative    Lives in her own condominium by herself.     Social Drivers of Health     Financial Resource Strain: Low Risk  (11/13/2018)    Overall Financial Resource Strain (CARDIA)     Difficulty of Paying Living Expenses: Not hard at all   Food Insecurity: No Food Insecurity (11/13/2018)    Hunger Vital Sign     Worried About Running Out of Food in the Last Year: Never true     Ran Out of Food in the Last Year: Never true   Transportation Needs: No Transportation Needs (11/13/2018)    PRAPARE - Transportation     Lack of Transportation (Medical): No     Lack of Transportation (Non-Medical): No   Physical Activity: Inactive (11/13/2018)    Exercise Vital Sign     Days of Exercise per Week: 0 days     Minutes of Exercise per Session: 0 min   Stress: No Stress Concern Present (11/13/2018)    [employer] [place] [employer] - Occupational Stress Questionnaire     Feeling of Stress : Not at all       Family History   Problem Relation Name Age of Onset    Heart failure Mother      Hypertension Mother      Heart disease Mother      Heart failure Father      Heart disease Father      Heart failure Brother      Hypertension Brother      Heart disease Brother      Hypertension Sister      Hypertension Sister      Heart disease Sister      Cancer Maternal Aunt      Hypertension Maternal Uncle      Hypertension Paternal Aunt      Hypertension Paternal Uncle      Asthma Cousin      Cancer Cousin      Amblyopia Neg Hx      Blindness Neg Hx      Cataracts Neg Hx      Diabetes Neg Hx      Glaucoma Neg Hx      Macular degeneration Neg Hx      Retinal detachment Neg Hx      Strabismus Neg Hx      Stroke Neg Hx      Thyroid disease Neg Hx      Melanoma  "Neg Hx      Psoriasis Neg Hx      Lupus Neg Hx         Physical Exam:    Vitals:    11/26/24 1246 11/26/24 1334 11/26/24 2221   BP: 128/76 128/76 (!) 145/89   BP Location: Right arm Right arm    Patient Position: Sitting Sitting    Pulse: 86 86 104   Resp: 18 16 16   Temp: 98.6 °F (37 °C) 98.6 °F (37 °C) 98.4 °F (36.9 °C)   TempSrc: Oral Oral    SpO2: 96% 97% 97%   Weight: 64.5 kg (142 lb 3.2 oz) 72.6 kg (160 lb)    Height:  4' 11" (1.499 m)      Appearance: No acute distress.  Head: Atraumatic.  Neck: No cervical spine tenderness.  Full range of motion.  No soft tissue tenderness.  Back:  Positive diffuse lower lumbar tenderness to palpation both paraspinally and midline.  There are no step-offs or deformities.  No thoracic spine tenderness.    Chest: No chest wall tenderness.  Breath sounds are equal bilaterally.  No wheezes.  No rhonchi.  No rales.  Cardiovascular: Regular rate and rhythm.  No murmurs.  No gallops.  No rubs.  Abdomen: Soft. Nontender.   No distention.  No guarding. No rebound.  No ecchymoses.  Integument: No ecchymoses or other signs of trauma.  Musculoskeletal: Good range of motion of all joints.  No bony tenderness in the extremities.  No deformities.  No soft tissue tenderness.  Neurologic: Motor intact.  Sensation intact.  Cranial nerves II through XII intact.  Cerebellar exam intact.  Mental status: Alert and oriented x 3.  GCS 15.      Initial Impression:  Acute lumbar pain status post fall    Labs Reviewed - No data to display      I decided to obtain the patient's medical records.    Imaging Results              X-Ray Lumbar Spine Lateral Supine and Lateral Upright (Final result)  Result time 11/26/24 22:48:42      Final result by Harshad Bob DO (11/26/24 22:48:42)                   Impression:      L1 compression fracture.      Electronically signed by: Harshad Bob  Date:    11/26/2024  Time:    22:48               Narrative:    EXAMINATION:  XR LUMBAR SPINE LATERAL SUPINE AND " LATERAL UPRIGHT    CLINICAL HISTORY:  L1 nunu fx;    TECHNIQUE:  Cross-table lateral supine and upright lateral radiographs of the lumbar spine radiographs.    COMPARISON:  CT lumbar spine from earlier the same date.  CTA chest from 01/05/2022.    FINDINGS:  There is acute compression fracture of the L1 vertebral body.  L1 height loss is worsened on the upright radiograph.  Remaining vertebral body heights are preserved.  There is mild grade 1 anterolisthesis of L4 on L5.  Alignment is otherwise unremarkable.  There are vascular calcifications.                                        CT Lumbar Spine Without Contrast (Final result)  Result time 11/26/24 16:30:01      Final result by Altaf Martínez MD (11/26/24 16:30:01)                   Impression:      Age indeterminate L1 burst fracture with moderate to severe height loss and 4 mm of retropulsion resulting in moderate spinal canal stenosis.    Lumbosacral transitional vertebra.    Multilevel lumbar spondylosis as above.    This report was flagged in Epic as abnormal.    Electronically signed by resident: Shanthi Jacome  Date:    11/26/2024  Time:    15:42    Electronically signed by: Altaf Martínez MD  Date:    11/26/2024  Time:    16:30               Narrative:    EXAMINATION:  CT LUMBAR SPINE WITHOUT CONTRAST    CLINICAL HISTORY:  Low back pain, trauma;    TECHNIQUE:  Low-dose axial, sagittal and coronal reformations are obtained through the lumbar spine.  Contrast was not administered.    COMPARISON:  CTA chest 01/05/2022, chest radiograph 05/27/2022,    FINDINGS:  Alignment: Grade 1 anterolisthesis of L4-L5 and L5-S1.  No spondylolysis.    Vertebrae: Age indeterminate L1 burst fracture with moderate to severe height loss and disruption of the posterior cortex and approximately 4 mm of retropulsion resulting in moderate spinal canal stenosis.  No lytic or blastic lesion.  Lumbosacral transitional vertebra with lumbarization of S1.    Discs: Moderate disc height  loss at L4-L5.    Sacroiliac joints: Mild degenerative changes of bilateral sacroiliac joints.    Degenerative findings:    L1-L2: Diffuse disc bulge and mild facet arthropathy.  Mild bilateral neural foraminal narrowing.  No spinal canal stenosis.    L2-L3: Small diffuse disc bulge and mild facet arthropathy.  Mild bilateral neural foraminal narrowing.  No spinal canal stenosis.    L3-L4: Small diffuse disc bulge, mild facet arthropathy, and ligamentum flavum hypertrophy.  Mild spinal canal stenosis and mild bilateral neural foraminal narrowing.    L4-L5: Diffuse disc bulge and posterior uncovering of the disc, moderate facet arthropathy and ligamentum flavum hypertrophy contribute to moderate spinal canal stenosis, and mild right and moderate left neural foraminal narrowing.    L5-S1: Posterior uncovering of the disc and moderate facet arthropathy.  Mild bilateral neural foraminal narrowing.  No spinal canal stenosis.    Paraspinal muscles & soft tissues: Advanced aortoiliac calcific atherosclerosis.  Diminished paraspinal muscle bulk.  Diverticulosis coli without evidence for acute diverticulitis.                                      Medications   acetaminophen tablet 1,000 mg (1,000 mg Oral Given 11/26/24 1707)                   MDM:    89 y.o. female with Acute lumbar pain status post fall.  Patient has neurologically intact.  I am concerned due to her age and persistent pain she may have a compression fracture or some going to order a CT of her lumbar spine.  There are no neurologic deficits to suggest there is any spinal canal activity if it is fractured.  Diagnosis will help in definitive pain control including referral to interventional pain if necessary.    CT pos for L1 burst fx w/ 4mm retropulsion with mod spinal canal stenosis.  NS consulted.  Discussed with them.  They evaluate patient and recommend TLSO and pain control w/ outpt f/u.  Pt and relative aware of dx and plan    Diagnostic Impression:     1. Closed burst fracture of lumbar vertebra, initial encounter         ED Disposition Condition    Discharge Stable          ED Prescriptions       Medication Sig Dispense Start Date End Date Auth. Provider    LIDOcaine (LIDODERM) 5 % Place 1 patch onto the skin once daily. Remove & Discard patch within 12 hours or as directed by MD 14 patch 11/26/2024 -- Timothy Reed MD          Follow-up Information       Follow up With Specialties Details Why Contact Info Additional Information    Grover Bardales MD Family Medicine Schedule an appointment as soon as possible for a visit  To discuss your recent ER visit and any additional concerns that you may have [address] [facility]  [place] [address]  [phone]       [facility] - Emergency Dept Emergency Medicine Go to  As needed, If symptoms worsen [address] [facility] [id number] [phone]     [facility] - Neurosurgery [facility] Neurosurgery   [address] [facility] [id number] [phone] 8th Floor Clinic [place] Please park in [facility]. Check in desk is located in the [facility]. Please take the C elevator to 8th floor which opens to the lobby.               Carmelo Figueroa III, MD  11/27/24 7725

## 2024-11-27 ENCOUNTER — TELEPHONE (OUTPATIENT)
Dept: NEUROSURGERY | Facility: CLINIC | Age: 89
End: 2024-11-27
Payer: MEDICARE

## 2024-11-27 DIAGNOSIS — G62.9 NEUROPATHY: Primary | ICD-10-CM

## 2024-11-27 NOTE — PROVIDER PROGRESS NOTES - EMERGENCY DEPT.
ED Attending Hand-off Note    I have discussed the patient's history and presentation in the ED with Carmelo Figueroa III, *    Brief H&P: Patient is a 89 y.o. female who presented to the ED with Fall (Pt. Had fall 4 days ago. Denies head injury and LOC. Pt. C/o back soreness, pt. Is ambulatory. )        Pending studies and consultations: NSGY recs    Disposition:  Will continue to monitor, update patient on results of testing and determine appropriate additional treatment for the duration of stay in ED.  Timothy Reed MD 11:20 PM 11/26/2024        UPDATES:   Discussed the case with Neurosurgery who recommends getting an x-ray and giving a TLSO brace.  Patient received a brace.  X-ray demonstrates the known fracture.  Patient's pain is controlled and she was able to ambulate.  Discussed with neurosurgery he was comfortable with the patient being discharged with outpatient management. Patient will be discharged at this time. Patient has been given home care instructions, follow up instructions, and strict return precautions. They agree with and are comfortable with the plan.           Clinical Impression  :  Closed burst fracture of lumbar vertebra, initial encounter (Primary)       ED Disposition Condition    Discharge Stable

## 2024-11-27 NOTE — CONSULTS
[facility] - Emergency Dept  Neurosurgery  Consult Note    Inpatient consult to Neurosurgery  Consult performed by: Lacey Vazquez MD  Consult ordered by: Carmelo Figueroa III, MD        Subjective:     Chief Complaint/Reason for Admission: lower back pain    History of Present Illness: Samantha Balderrama is a 89 y.o. female who presents with lower back pain. Pt's family states pt was walking with her cane on Thursday when she slipped on the floor and fell backwards, hitting her buttocks, back, and head on the ground. Pt has been taking tylenol with no relief and pain to lumber region has progressively gotten worse. Pt denies LOC, weakness, dizziness, and N/V before and after incident. No bruising noted. Tenderness upon palpation noted to bilateral lumbar regions. No pain or deviation noted to spine. Denies neck pain or arm weakness, numbness, or tingling. Denies bladder/bowel dysfunction. CT Lsp wo today showed L1 burst fx w/ moderate-severe height loss & 4mm retropulsion causing moderate spinal canal stenosis. NSGY was consulted for further evaluation and management.    (Not in a hospital admission)      Review of patient's allergies indicates:  No Known Allergies    Past Medical History:   Diagnosis Date    (HFpEF) heart failure with preserved ejection fraction 1/6/2022    Allergy     Basal cell carcinoma 2003    Dr. 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 SAQIB Nielsen/ right side of nose and forhead also.    Stroke 2/2/2021    Subclinical hypothyroidism 3/29/2024     Past Surgical History:   Procedure Laterality Date    BREAST SURGERY Right 1990    x2- Mastectomy    BREAST SURGERY Left     Mastectomy    CATARACT  EXTRACTION Bilateral 2012    Dr. Crocker    EYE SURGERY      MASTECTOMY, RADICAL Left 1988     Family History       Problem Relation (Age of Onset)    Asthma Cousin    Cancer Maternal Aunt, Cousin    Heart disease Mother, Father, Brother, Sister    Heart failure Mother, Father, Brother    Hypertension Mother, Brother, Sister, Sister, Maternal Uncle, Paternal Aunt, Paternal Uncle          Tobacco Use    Smoking status: Never    Smokeless tobacco: Never   Substance and Sexual Activity    Alcohol use: Yes     Alcohol/week: 1.0 standard drink of alcohol     Types: 1 Shots of liquor per week     Comment: lorenza hollingsworthi a day    Drug use: No    Sexual activity: Not Currently     Review of Systems  Objective:     Weight: 72.6 kg (160 lb)  Body mass index is 32.32 kg/m².  Vital Signs (Most Recent):  Temp: 98.6 °F (37 °C) (11/26/24 1334)  Pulse: 86 (11/26/24 1334)  Resp: 16 (11/26/24 1334)  BP: 128/76 (11/26/24 1334)  SpO2: 97 % (11/26/24 1334) Vital Signs (24h Range):  Temp:  [98.6 °F (37 °C)] 98.6 °F (37 °C)  Pulse:  [86] 86  Resp:  [16-18] 16  SpO2:  [96 %-97 %] 97 %  BP: (128)/(76) 128/76                                 Physical Exam         Neurosurgery Physical Exam    Neurosurgery Physical Exam 11/26/2024       General: well developed, well nourished  HEENT: normocephalic, atraumatic  CV: regular rate   Pulmonary: normal respirations, no signs of respiratory distress  Abdomen: soft, non-distended, not tender to palpation  Skin: unremarkable  Heme: no bruising     Neuro:   Mental Status: AO x4  CN II-XII intact  Sensory: intact to light touch throughout  Motor:    Strength   Deltoids Biceps Triceps Wrist Extension Wrist Flexion    Upper: R 5/5 5/5 5/5 5/5 5/5 5/5     L 5/5 5/5 5/5 5/5 5/5 5/5       Hip Flex Knee Ext Knee Flex DF PF EHL   Lower: R 5/5 5/5 5/5 5/5 5/5 5/5     L 5/5 5/5 5/5 5/5 5/5 5/5   Reflexes: -Melendez's, -Babinski, no clonus. Patellar: 2+ bilaterally   Gait: deferred  Back: no tenderness to palpation  "along spinous processes  Rectal: tone intact     Significant Labs:  No results for input(s): "GLU", "NA", "K", "CL", "CO2", "BUN", "CREATININE", "CALCIUM", "MG" in the last 48 hours.  No results for input(s): "WBC", "HGB", "HCT", "PLT" in the last 48 hours.  No results for input(s): "LABPT", "INR", "APTT" in the last 48 hours.  Microbiology Results (last 7 days)       ** No results found for the last 168 hours. **          All pertinent labs from the last 24 hours have been reviewed.    Significant Diagnostics:  I have reviewed all pertinent imaging results/findings within the past 24 hours.  Assessment/Plan:     Lumbar burst fracture  Samantha Balderrama is a 89 y.o. female who presents with lower back pain. Pt's family states pt was walking with her cane on Thursday when she slipped on the floor and fell backwards, hitting her buttocks, back, and head on the ground. Pt has been taking tylenol with no relief and pain to lumber region has progressively gotten worse. Pt denies LOC, weakness, dizziness, and N/V before and after incident. No bruising noted. Tenderness upon palpation noted to bilateral lumbar regions. No pain or deviation noted to spine. Denies neck pain or arm weakness, numbness, or tingling. Denies bladder/bowel dysfunction. CT Lsp wo today showed L1 burst fx w/ moderate-severe height loss & 4mm retropulsion causing moderate spinal canal stenosis. NSGY was consulted for further evaluation and management.     Imaging:  CT Lsp wo 11/26: L1 burst fx w/ moderate-severe height loss & 4mm retropulsion causing moderate spinal canal stenosis.    Plan:  -NSGY seen at bedside  -All labs and imaging were reviewed  -No acute surgical intervention is indicated at this time  -Should admit to HM for medical manamgement given the age and multiple chronic conditions  -Will order TLSO  -Will FU XR lumbar upright and supine in brace  -Will FU PVR  -Will consider CT T/Lsp robotic protocol at some point later when able  -Pain " control  -Continue to monitor clinically, notify NSGY immediately with any changes in neuro status     Discussed with on call staff Dr. Conde        Thank you for your consult. I will follow-up with patient. Please contact us if you have any additional questions.    Lacey Vazquez MD  Neurosurgery  [facility] - Emergency Dept

## 2024-11-27 NOTE — TELEPHONE ENCOUNTER
----- Message from Isha sent at 11/27/2024 12:29 PM CST -----  Contact: [phone] @ [facility]  [email or web address]     Patient is calling for Medical Advice regarding:Patient went to ER and niece need to know how long to have brace on     How long has patient had these symptoms:    Pharmacy name and phone#:    Patient wants a call back or thru [facility]:call     Comments:    Please advise patient replies from provider may take up to 48 hours.

## 2024-11-27 NOTE — ASSESSMENT & PLAN NOTE
Samantha Balderrama is a 89 y.o. female who presents with lower back pain. Pt's family states pt was walking with her cane on Thursday when she slipped on the floor and fell backwards, hitting her buttocks, back, and head on the ground. Pt has been taking tylenol with no relief and pain to lumber region has progressively gotten worse. Pt denies LOC, weakness, dizziness, and N/V before and after incident. No bruising noted. Tenderness upon palpation noted to bilateral lumbar regions. No pain or deviation noted to spine. Denies neck pain or arm weakness, numbness, or tingling. Denies bladder/bowel dysfunction. CT Lsp wo today showed L1 burst fx w/ moderate-severe height loss & 4mm retropulsion causing moderate spinal canal stenosis. NSGY was consulted for further evaluation and management.     Imaging:  CT Lsp wo 11/26: L1 burst fx w/ moderate-severe height loss & 4mm retropulsion causing moderate spinal canal stenosis.    Plan:  -NSGY seen at bedside  -All labs and imaging were reviewed  -No acute surgical intervention is indicated at this time  -Should admit to  for medical manamgement given the age and multiple chronic conditions  -Will order TLSO  -Will FU XR lumbar upright and supine in brace  -Will FU PVR  -Will consider CT T/Lsp robotic protocol at some point later when able  -Pain control  -Continue to monitor clinically, notify NSGY immediately with any changes in neuro status     Discussed with on call staff Dr. Conde

## 2024-11-27 NOTE — DISCHARGE INSTRUCTIONS
It was a pleasure taking care of you today!     Diagnosis: Lumbar Spine Fracture    Home Care:   - Tylenol 1000mg every 8 hours for pain  - May use a Lidoderm patch for 12 hours daily    Follow-Up Plan:  - Follow up with Neurosurgery. They should contact you to schedule an appointment or you may call the number below  - Follow-up with primary care doctor within 3 - 5 days to discuss your recent ER visit and any additional concerns that you may have.  - Additional testing and/or evaluation as directed by your primary doctor    Return to the Emergency Department for symptoms including but not limited to: persistence or worsening of symptoms, shortness of breath or chest pain, inability to drink without vomiting, passing out/fainting/ loss of consciousness, or if you have other concerns.

## 2024-11-27 NOTE — ED NOTES
Contacted brace line for TLSO brace. Consulted stating will be on the  way here from [facility]

## 2024-11-27 NOTE — HPI
Samantha Balderrama is a 89 y.o. female who presents with lower back pain. Pt's family states pt was walking with her cane on Thursday when she slipped on the floor and fell backwards, hitting her buttocks, back, and head on the ground. Pt has been taking tylenol with no relief and pain to lumber region has progressively gotten worse. Pt denies LOC, weakness, dizziness, and N/V before and after incident. No bruising noted. Tenderness upon palpation noted to bilateral lumbar regions. No pain or deviation noted to spine. Denies neck pain or arm weakness, numbness, or tingling. Denies bladder/bowel dysfunction. CT Lsp wo today showed L1 burst fx w/ moderate-severe height loss & 4mm retropulsion causing moderate spinal canal stenosis. NSGY was consulted for further evaluation and management.

## 2024-11-27 NOTE — SUBJECTIVE & OBJECTIVE
(Not in a hospital admission)      Review of patient's allergies indicates:  No Known Allergies    Past Medical History:   Diagnosis Date    (HFpEF) heart failure with preserved ejection fraction 1/6/2022    Allergy     Basal cell carcinoma 2003    Dr. 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 SAQIB Nielsen/ right side of nose and forhead also.    Stroke 2/2/2021    Subclinical hypothyroidism 3/29/2024     Past Surgical History:   Procedure Laterality Date    BREAST SURGERY Right 1990    x2- Mastectomy    BREAST SURGERY Left     Mastectomy    CATARACT EXTRACTION Bilateral 2012    Dr. Crocker    EYE SURGERY      MASTECTOMY, RADICAL Left 1988     Family History       Problem Relation (Age of Onset)    Asthma Cousin    Cancer Maternal Aunt, Cousin    Heart disease Mother, Father, Brother, Sister    Heart failure Mother, Father, Brother    Hypertension Mother, Brother, Sister, Sister, Maternal Uncle, Paternal Aunt, Paternal Uncle          Tobacco Use    Smoking status: Never    Smokeless tobacco: Never   Substance and Sexual Activity    Alcohol use: Yes     Alcohol/week: 1.0 standard drink of alcohol     Types: 1 Shots of liquor per week     Comment: a martini a day    Drug use: No    Sexual activity: Not Currently     Review of Systems  Objective:     Weight: 72.6 kg (160 lb)  Body mass index is 32.32 kg/m².  Vital Signs (Most Recent):  Temp: 98.6 °F (37 °C) (11/26/24 1334)  Pulse: 86 (11/26/24 1334)  Resp: 16 (11/26/24 1334)  BP: 128/76 (11/26/24 1334)  SpO2: 97 % (11/26/24 1334) Vital Signs (24h Range):  Temp:  [98.6 °F (37 °C)] 98.6 °F (37 °C)  Pulse:  [86] 86  Resp:  [16-18] 16  SpO2:  [96 %-97 %] 97 %  BP: (128)/(76)  "128/76                                 Physical Exam         Neurosurgery Physical Exam    Neurosurgery Physical Exam 11/26/2024       General: well developed, well nourished  HEENT: normocephalic, atraumatic  CV: regular rate   Pulmonary: normal respirations, no signs of respiratory distress  Abdomen: soft, non-distended, not tender to palpation  Skin: unremarkable  Heme: no bruising     Neuro:   Mental Status: AO x4  CN II-XII intact  Sensory: intact to light touch throughout  Motor:    Strength   Deltoids Biceps Triceps Wrist Extension Wrist Flexion    Upper: R 5/5 5/5 5/5 5/5 5/5 5/5     L 5/5 5/5 5/5 5/5 5/5 5/5       Hip Flex Knee Ext Knee Flex DF PF EHL   Lower: R 5/5 5/5 5/5 5/5 5/5 5/5     L 5/5 5/5 5/5 5/5 5/5 5/5   Reflexes: -Melendez's, -Babinski, no clonus. Patellar: 2+ bilaterally   Gait: deferred  Back: no tenderness to palpation along spinous processes  Rectal: tone intact     Significant Labs:  No results for input(s): "GLU", "NA", "K", "CL", "CO2", "BUN", "CREATININE", "CALCIUM", "MG" in the last 48 hours.  No results for input(s): "WBC", "HGB", "HCT", "PLT" in the last 48 hours.  No results for input(s): "LABPT", "INR", "APTT" in the last 48 hours.  Microbiology Results (last 7 days)       ** No results found for the last 168 hours. **          All pertinent labs from the last 24 hours have been reviewed.    Significant Diagnostics:  I have reviewed all pertinent imaging results/findings within the past 24 hours.  "

## 2024-11-29 ENCOUNTER — PATIENT OUTREACH (OUTPATIENT)
Dept: EMERGENCY MEDICINE | Facility: HOSPITAL | Age: 89
End: 2024-11-29
Payer: MEDICARE

## 2024-11-29 ENCOUNTER — TELEPHONE (OUTPATIENT)
Dept: INTERNAL MEDICINE | Facility: CLINIC | Age: 89
End: 2024-11-29
Payer: MEDICARE

## 2024-11-29 DIAGNOSIS — J98.01 COUGH DUE TO BRONCHOSPASM: ICD-10-CM

## 2024-11-29 RX ORDER — BENZONATATE 200 MG/1
200 CAPSULE ORAL 3 TIMES DAILY PRN
Qty: 30 CAPSULE | Refills: 0 | Status: SHIPPED | OUTPATIENT
Start: 2024-11-29

## 2024-11-29 NOTE — TELEPHONE ENCOUNTER
I spoke to pt's niece Wilner/Bryan [phone], pt had a VM let on her cell to call the clinic. I don't see a message in pt's chart to contact Dr. Bardales's staff. Message may have been from pt's neurosurgeon.  We went over upcoming appt.  Wilner verbalized understanding

## 2024-11-29 NOTE — TELEPHONE ENCOUNTER
LOV 9-17-24  Annual 10-16-23    I spoke to pt's sister, pt is coughing a lot at night. She is requesting RX refill below.  Annual scheduled 1-6-25.  I tried scheduling appt. Daksha will have Belkys call me on Monday for scheduling.    Thanks

## 2024-11-29 NOTE — TELEPHONE ENCOUNTER
----- Message from Christa sent at 11/29/2024  9:38 AM CST -----  Contact: Wilner/Bryan [phone]  Patient is returning a phone call.    Who left a message for the patient: Unknown    Does patient know what this is regarding:  thinks it may be for recent ER visit    Would you like a call back, or a response through your [facility] portal?:   Call back    Comments:     Please call and advise.    Thank You

## 2024-12-03 ENCOUNTER — PATIENT MESSAGE (OUTPATIENT)
Dept: INTERNAL MEDICINE | Facility: CLINIC | Age: 89
End: 2024-12-03
Payer: MEDICARE

## 2024-12-03 NOTE — TELEPHONE ENCOUNTER
Annual 10-16-23    I spoke to pt's niece Trinidad at [phone], she was notified pt's last annual/physical with Dr. Bardales was on 10-16-23.  Pt is having back pain and the family is requesting stronger Rx. She was advised to contact neurosurgeon for pain management Pt will need an appt with Dr. Bardales before medication can be prescribed. She was informed ER visit maybe needed. Pt saw neurosurgeon in the hospital. She will try calling the office again.

## 2024-12-04 ENCOUNTER — OFFICE VISIT (OUTPATIENT)
Dept: NEUROSURGERY | Facility: CLINIC | Age: 89
End: 2024-12-04
Payer: MEDICARE

## 2024-12-04 VITALS
SYSTOLIC BLOOD PRESSURE: 157 MMHG | HEIGHT: 59 IN | DIASTOLIC BLOOD PRESSURE: 67 MMHG | WEIGHT: 138.44 LBS | BODY MASS INDEX: 27.91 KG/M2 | OXYGEN SATURATION: 95 % | HEART RATE: 101 BPM

## 2024-12-04 DIAGNOSIS — S32.011D CLOSED STABLE BURST FRACTURE OF FIRST LUMBAR VERTEBRA WITH ROUTINE HEALING, SUBSEQUENT ENCOUNTER: Primary | ICD-10-CM

## 2024-12-04 PROCEDURE — 99214 OFFICE O/P EST MOD 30 MIN: CPT | Mod: S$GLB,,, | Performed by: PHYSICIAN ASSISTANT

## 2024-12-04 PROCEDURE — 1125F AMNT PAIN NOTED PAIN PRSNT: CPT | Mod: CPTII,S$GLB,, | Performed by: PHYSICIAN ASSISTANT

## 2024-12-04 PROCEDURE — 1160F RVW MEDS BY RX/DR IN RCRD: CPT | Mod: CPTII,S$GLB,, | Performed by: PHYSICIAN ASSISTANT

## 2024-12-04 PROCEDURE — 1100F PTFALLS ASSESS-DOCD GE2>/YR: CPT | Mod: CPTII,S$GLB,, | Performed by: PHYSICIAN ASSISTANT

## 2024-12-04 PROCEDURE — 1159F MED LIST DOCD IN RCRD: CPT | Mod: CPTII,S$GLB,, | Performed by: PHYSICIAN ASSISTANT

## 2024-12-04 PROCEDURE — 1157F ADVNC CARE PLAN IN RCRD: CPT | Mod: CPTII,S$GLB,, | Performed by: PHYSICIAN ASSISTANT

## 2024-12-04 PROCEDURE — 3288F FALL RISK ASSESSMENT DOCD: CPT | Mod: CPTII,S$GLB,, | Performed by: PHYSICIAN ASSISTANT

## 2024-12-04 NOTE — PROGRESS NOTES
[facility]  Neurosurgery    SUBJECTIVE:     Interval history 12/4/24:  Patient presents with her two nieces to review pain regimen, bracing, and activities related to her L1 burst fracture. She has been taking tylenol 1000mg TID with last dose being at bedtime. Unfortunately, she wakes up in severe pain in the middle of the night with nothing to take. Lidocaine patches were helpful but began to cause skin irritation. TLSO brace has been uncomfortable because the anterior bar is pushing into her neck.       History of Present Illness from ED consult 11/26/24: Samantha Balderrama is a 89 y.o. female who presents with lower back pain. Pt's family states pt was walking with her cane on Thursday when she slipped on the floor and fell backwards, hitting her buttocks, back, and head on the ground. Pt has been taking tylenol with no relief and pain to lumber region has progressively gotten worse. Pt denies LOC, weakness, dizziness, and N/V before and after incident. No bruising noted. Tenderness upon palpation noted to bilateral lumbar regions. No pain or deviation noted to spine. Denies neck pain or arm weakness, numbness, or tingling. Denies bladder/bowel dysfunction. CT Lsp wo today showed L1 burst fx w/ moderate-severe height loss & 4mm retropulsion causing moderate spinal canal stenosis. NSGY was consulted for further evaluation and management.     (Not in a hospital admission)      Review of patient's allergies indicates:  No Known Allergies    Past Medical History:   Diagnosis Date    (HFpEF) heart failure with preserved ejection fraction 1/6/2022    Allergy     Basal cell carcinoma 2003    Dr. 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 SAQIB Nielsen/ right side of nose and forhead also.    Stroke 2/2/2021    Subclinical hypothyroidism 3/29/2024     Past Surgical History:   Procedure Laterality Date    BREAST SURGERY Right 1990    x2- Mastectomy    BREAST SURGERY Left     Mastectomy    CATARACT EXTRACTION Bilateral 2012    Dr. Crocker    EYE SURGERY      MASTECTOMY, RADICAL Left 1988     Social History     Tobacco Use    Smoking status: Never    Smokeless tobacco: Never   Substance Use Topics    Alcohol use: Yes     Alcohol/week: 1.0 standard drink of alcohol     Types: 1 Shots of liquor per week     Comment: socially    Drug use: Never        Review of Systems:  As noted in HPI    OBJECTIVE:     Vital Signs (Most Recent):  Vitals:    12/04/24 0957   BP: (!) 157/67   Pulse: 101   SpO2: 95%   Weight: 62.8 kg (138 lb 7.2 oz)   Height: 4' 11" (1.499 m)   PainSc: 10-Worst pain ever   PainLoc: Back     Body mass index is 27.96 kg/m².      Physical Exam:  General: well developed, well nourished, no distress  Head: normocephalic, atraumatic  Neurologic: Alert and oriented. Thought content appropriate  GCS: Motor: 6/Verbal: 5/Eyes: 4 GCS Total: 15  Language: No aphasia  Speech: No dysarthria  Cranial nerves: face symmetric, tongue midline, CN II-XII grossly intact.   Eyes: pupils equal, round, reactive to light with accommodation, EOMI.   Pulmonary: normal respirations, not labored, no accessory muscles used    Sensory: intact to light touch throughout  Motor Strength: Moves all extremities spontaneously with good tone.  Full strength upper and lower extremities. No abnormal movements seen.   TLSO brace worn properly. Anterior bar removed in clinic and brace refitted as an LSO.     Gait: presents in wheelchair.  Able to stand with minimal assistance    Midline Bony Tenderness: negative      Diagnostic Results:  No new imaging for review         ASSESSMENT/PLAN:     Samantha Balderrama is a 89 y.o. female who presents evaluation " following an L1 burst fracture sustained almost 2 weeks ago.  She has been wearing her TLSO brace as instructed and finds it very uncomfortable.  I was able to remove the anterior bar on the brace and refilled it for improved comfort.      We also reviewed her OTC pain regimen.    --As opposed to Tylenol 1000 mg t.i.d., I recommend switching to 650 mg q.i.d. to allow for a dose overnight if needed.  She was also asked to avoid alcohol while on this Tylenol regimen.  --lidocaine patches has been causing irritation.  She may switch to a lidocaine cream.  --recommend adding Voltaren gel and/or Biofreeze   --Continue using heat and/or ice as needed    The patient's niece had requested a stronger pain medication.  However, I explained that I do not routinely prescribed narcotics in his age group.  Chart review reveals that PCP is willing to consider additional pain medication, but they have not seen the patient in > 1 year and would need an in-person appointment 1st. If this new OTC regimen is not effective, I recommend she schedule an appointment with her primary care to discuss further    Follow up as scheduled in January with repeat x-rays  Continue using brace whenever upright or out of bed   No lifting > 10 lb, bending, or twisting    Please feel free to call with any further questions      Time spent on this encounter: 45 minutes. This includes face-to-face time and non-face to face time preparing to see the patient (eg, review of tests), obtaining and/or reviewing separately obtained history, documenting clinical information in the electronic or other health record, independently interpreting results and communicating results to the patient/family/caregiver, or care coordinator.      Poornima Concepcion PA-C  [facility]  Department of Neurosurgery  [phone]    Disclaimer: This note was dictated by speech recognition. Minor errors in transcription may be present.  Please call with any questions.

## 2025-01-13 ENCOUNTER — TELEPHONE (OUTPATIENT)
Dept: NEUROSURGERY | Facility: CLINIC | Age: OVER 89
End: 2025-01-13
Payer: MEDICARE

## 2025-01-14 ENCOUNTER — OFFICE VISIT (OUTPATIENT)
Dept: NEUROSURGERY | Facility: CLINIC | Age: OVER 89
End: 2025-01-14
Payer: MEDICARE

## 2025-01-14 ENCOUNTER — HOSPITAL ENCOUNTER (OUTPATIENT)
Dept: RADIOLOGY | Facility: HOSPITAL | Age: OVER 89
Discharge: HOME | End: 2025-01-14
Attending: STUDENT IN AN ORGANIZED HEALTH CARE EDUCATION/TRAINING PROGRAM
Payer: MEDICARE

## 2025-01-14 VITALS
HEIGHT: 59 IN | DIASTOLIC BLOOD PRESSURE: 72 MMHG | OXYGEN SATURATION: 95 % | HEART RATE: 96 BPM | SYSTOLIC BLOOD PRESSURE: 120 MMHG | WEIGHT: 133.63 LBS | BODY MASS INDEX: 26.94 KG/M2

## 2025-01-14 DIAGNOSIS — G62.9 NEUROPATHY: ICD-10-CM

## 2025-01-14 DIAGNOSIS — S32.001A CLOSED BURST FRACTURE OF LUMBAR VERTEBRA, INITIAL ENCOUNTER: ICD-10-CM

## 2025-01-14 PROCEDURE — 1157F ADVNC CARE PLAN IN RCRD: CPT | Mod: CPTII,S$GLB,, | Performed by: PHYSICIAN ASSISTANT

## 2025-01-14 PROCEDURE — 72100 X-RAY EXAM L-S SPINE 2/3 VWS: CPT | Mod: 26,HCNC,, | Performed by: RADIOLOGY

## 2025-01-14 PROCEDURE — 1159F MED LIST DOCD IN RCRD: CPT | Mod: CPTII,S$GLB,, | Performed by: PHYSICIAN ASSISTANT

## 2025-01-14 PROCEDURE — 1101F PT FALLS ASSESS-DOCD LE1/YR: CPT | Mod: CPTII,S$GLB,, | Performed by: PHYSICIAN ASSISTANT

## 2025-01-14 PROCEDURE — 99213 OFFICE O/P EST LOW 20 MIN: CPT | Mod: S$GLB,,, | Performed by: PHYSICIAN ASSISTANT

## 2025-01-14 PROCEDURE — 1125F AMNT PAIN NOTED PAIN PRSNT: CPT | Mod: CPTII,S$GLB,, | Performed by: PHYSICIAN ASSISTANT

## 2025-01-14 PROCEDURE — 72100 X-RAY EXAM L-S SPINE 2/3 VWS: CPT | Mod: TC,HCNC,FY

## 2025-01-14 PROCEDURE — 1160F RVW MEDS BY RX/DR IN RCRD: CPT | Mod: CPTII,S$GLB,, | Performed by: PHYSICIAN ASSISTANT

## 2025-01-14 PROCEDURE — 3288F FALL RISK ASSESSMENT DOCD: CPT | Mod: CPTII,S$GLB,, | Performed by: PHYSICIAN ASSISTANT

## 2025-01-14 NOTE — PROGRESS NOTES
[facility]  Neurosurgery    SUBJECTIVE:     Interval history 01/14/2025:   Patient returns to clinic with her 2 nieces for follow up of an L1 burst fracture with new x-rays for review.  She continues to have back pain, though slightly improved compared to her last visit.  She is taking Tylenol less often.  Denies leg pain, numbness, and weakness.  Further denies saddle anesthesia and b/b dysfunction.    She stopped wearing her brace after Serg because she did not find it helpful    Interval history 12/4/24:  Patient presents with her two nieces to review pain regimen, bracing, and activities related to her L1 burst fracture. She has been taking tylenol 1000mg TID with last dose being at bedtime. Unfortunately, she wakes up in severe pain in the middle of the night with nothing to take. Lidocaine patches were helpful but began to cause skin irritation. TLSO brace has been uncomfortable because the anterior bar is pushing into her neck.     History of Present Illness from ED consult 11/26/24: Samantha Balderrama is a 89 y.o. female who presents with lower back pain. Pt's family states pt was walking with her cane on Thursday when she slipped on the floor and fell backwards, hitting her buttocks, back, and head on the ground. Pt has been taking tylenol with no relief and pain to lumber region has progressively gotten worse. Pt denies LOC, weakness, dizziness, and N/V before and after incident. No bruising noted. Tenderness upon palpation noted to bilateral lumbar regions. No pain or deviation noted to spine. Denies neck pain or arm weakness, numbness, or tingling. Denies bladder/bowel dysfunction. CT Lsp wo today showed L1 burst fx w/ moderate-severe height loss & 4mm retropulsion causing moderate spinal canal stenosis. NSGY was consulted for further evaluation and management.     (Not in a hospital admission)      Review of patient's allergies indicates:  No Known Allergies    Past Medical History:  "  Diagnosis Date    (HFpEF) heart failure with preserved ejection fraction 1/6/2022    Allergy     Basal cell carcinoma 2003    Dr. 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 SAQIB Nielsen/ right side of nose and forhead also.    Stroke 2/2/2021    Subclinical hypothyroidism 3/29/2024     Past Surgical History:   Procedure Laterality Date    BREAST SURGERY Right 1990    x2- Mastectomy    BREAST SURGERY Left     Mastectomy    CATARACT EXTRACTION Bilateral 2012    Dr. Crocker    EYE SURGERY      MASTECTOMY, RADICAL Left 1988     Social History     Tobacco Use    Smoking status: Never    Smokeless tobacco: Never   Substance Use Topics    Alcohol use: Yes     Alcohol/week: 1.0 standard drink of alcohol     Types: 1 Shots of liquor per week     Comment: socially    Drug use: Never        Review of Systems:  As noted in HPI    OBJECTIVE:     Vital Signs (Most Recent):  Vitals:    01/14/25 1312   BP: 120/72   Pulse: 96   SpO2: 95%   Weight: 60.6 kg (133 lb 9.6 oz)   Height: 4' 11" (1.499 m)   PainSc:   4   PainLoc: Back     Body mass index is 26.98 kg/m².      Physical Exam:  General: well developed, well nourished, no distress  Head: normocephalic, atraumatic  Neurologic: Alert and oriented. Thought content appropriate  GCS: Motor: 6/Verbal: 5/Eyes: 4 GCS Total: 15  Language: No aphasia  Speech: No dysarthria  Cranial nerves: face symmetric, tongue midline, CN II-XII grossly intact.   Eyes: pupils equal, round, reactive to light with accommodation, EOMI.   Pulmonary: normal respirations, not labored, no accessory muscles used    Sensory: intact to light touch throughout  Motor Strength: Moves all extremities spontaneously with " good tone.    5/5 b/l hip flexion, knee extension, dorsiflexion, and plantar flexion     No brace today    Gait: presents in wheelchair.     Midline Bony Tenderness: negative      Diagnostic Results:  I have personally reviewed imaging and agree with the findings.     Upright x-ray lumbar spine 01/14/2025   L1 burst compression fracture with worsening osseous retropulsion and height loss.      ASSESSMENT/PLAN:     Samantha Balderrama is a 89 y.o. female who presents for follow up of following an L1 burst fracture sustained 7 weeks ago.  She had been wearing her brace as instructed but stopped after Serg because she did not find it helpful.  X-rays today show progression of her L1 compression fracture, now severe with slightly worsened retropulsion.  Fortunately, she has no radicular symptoms and is neurologically intact on exam.    I recommend she resume wearing her brace whenever upright or out of bed   No lifting > 10 lb, bending, or twisting  Advised of s/s that would warrant sooner follow up.    Otherwise, plan for follow up in clinic with repeat x-rays in 4 weeks    Please feel free to call with any further questions        Poornima Concepcion PA-C  [facility]  Department of Neurosurgery  [phone]    Disclaimer: This note was dictated by speech recognition. Minor errors in transcription may be present.  Please call with any questions.

## 2025-01-30 DIAGNOSIS — Z00.00 ENCOUNTER FOR MEDICARE ANNUAL WELLNESS EXAM: ICD-10-CM

## 2025-02-18 ENCOUNTER — OFFICE VISIT (OUTPATIENT)
Dept: NEUROSURGERY | Facility: CLINIC | Age: OVER 89
End: 2025-02-18
Payer: MEDICARE

## 2025-02-18 ENCOUNTER — HOSPITAL ENCOUNTER (OUTPATIENT)
Dept: RADIOLOGY | Facility: HOSPITAL | Age: OVER 89
Discharge: HOME | End: 2025-02-18
Attending: PHYSICIAN ASSISTANT
Payer: MEDICARE

## 2025-02-18 VITALS
WEIGHT: 136 LBS | RESPIRATION RATE: 18 BRPM | TEMPERATURE: 98 F | OXYGEN SATURATION: 97 % | BODY MASS INDEX: 27.42 KG/M2 | HEIGHT: 59 IN | DIASTOLIC BLOOD PRESSURE: 67 MMHG | HEART RATE: 75 BPM | SYSTOLIC BLOOD PRESSURE: 148 MMHG

## 2025-02-18 DIAGNOSIS — S32.011D CLOSED STABLE BURST FRACTURE OF FIRST LUMBAR VERTEBRA WITH ROUTINE HEALING, SUBSEQUENT ENCOUNTER: Primary | ICD-10-CM

## 2025-02-18 DIAGNOSIS — S32.001A CLOSED BURST FRACTURE OF LUMBAR VERTEBRA, INITIAL ENCOUNTER: ICD-10-CM

## 2025-02-18 PROCEDURE — 72100 X-RAY EXAM L-S SPINE 2/3 VWS: CPT | Mod: TC,HCNC,FY

## 2025-02-18 PROCEDURE — 72100 X-RAY EXAM L-S SPINE 2/3 VWS: CPT | Mod: 26,HCNC,, | Performed by: RADIOLOGY

## 2025-02-18 NOTE — PROGRESS NOTES
[facility]  Neurosurgery    SUBJECTIVE:     Interval history 2/18/25:  Patient returns to clinic for FU of severe L1 burst fracture. Pain has continued to improve and is nearly resolved. When present, back pain is tolerable. At most, she will take an occasional tylenol. Denies leg pain and weakness. Ambulating at home with cane or rolling walker. Hopeful to d/c back brace.     Interval history 01/14/2025:   Patient returns to clinic with her 2 nieces for follow up of an L1 burst fracture with new x-rays for review.  She continues to have back pain, though slightly improved compared to her last visit.  She is taking Tylenol less often.  Denies leg pain, numbness, and weakness.  Further denies saddle anesthesia and b/b dysfunction.    She stopped wearing her brace after Serg because she did not find it helpful    Interval history 12/4/24:  Patient presents with her two nieces to review pain regimen, bracing, and activities related to her L1 burst fracture. She has been taking tylenol 1000mg TID with last dose being at bedtime. Unfortunately, she wakes up in severe pain in the middle of the night with nothing to take. Lidocaine patches were helpful but began to cause skin irritation. TLSO brace has been uncomfortable because the anterior bar is pushing into her neck.     History of Present Illness from ED consult 11/26/24: Samantha Balderrama is a 89 y.o. female who presents with lower back pain. Pt's family states pt was walking with her cane on Thursday when she slipped on the floor and fell backwards, hitting her buttocks, back, and head on the ground. Pt has been taking tylenol with no relief and pain to lumber region has progressively gotten worse. Pt denies LOC, weakness, dizziness, and N/V before and after incident. No bruising noted. Tenderness upon palpation noted to bilateral lumbar regions. No pain or deviation noted to spine. Denies neck pain or arm weakness, numbness, or tingling. Denies  "bladder/bowel dysfunction. CT Lsp wo today showed L1 burst fx w/ moderate-severe height loss & 4mm retropulsion causing moderate spinal canal stenosis. NSGY was consulted for further evaluation and management.     (Not in a hospital admission)      Review of patient's allergies indicates:  No Known Allergies    Past Medical History:   Diagnosis Date    (HFpEF) heart failure with preserved ejection fraction 1/6/2022    Allergy     Basal cell carcinoma 2003    Dr. 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 SAQIB Nielsen/ right side of nose and forhead also.    Stroke 2/2/2021    Subclinical hypothyroidism 3/29/2024     Past Surgical History:   Procedure Laterality Date    BREAST SURGERY Right 1990    x2- Mastectomy    BREAST SURGERY Left     Mastectomy    CATARACT EXTRACTION Bilateral 2012    Dr. Crocker    EYE SURGERY      MASTECTOMY, RADICAL Left 1988     Social History     Tobacco Use    Smoking status: Never    Smokeless tobacco: Never   Substance Use Topics    Alcohol use: Yes     Alcohol/week: 1.0 standard drink of alcohol     Types: 1 Shots of liquor per week     Comment: socially    Drug use: Never        Review of Systems:  As noted in HPI    OBJECTIVE:     Vital Signs (Most Recent):  Vitals:    02/18/25 1121   Weight: 61.7 kg (136 lb 0.4 oz)   Height: 4' 11" (1.499 m)   PainSc:   1   PainLoc: Back     Body mass index is 27.47 kg/m².      Physical Exam:  General: well developed, well nourished, no distress  Head: normocephalic, atraumatic  Neurologic: Alert and oriented. Thought content appropriate  GCS: Motor: 6/Verbal: 5/Eyes: 4 GCS Total: 15  Language: No aphasia  Speech: No dysarthria  Cranial nerves: face " symmetric, tongue midline, CN II-XII grossly intact.   Eyes: pupils equal, round, reactive to light with accommodation, EOMI.   Pulmonary: normal respirations, not labored, no accessory muscles used    Sensory: intact to light touch throughout BLE   Motor Strength: Moves all extremities spontaneously with good tone.    5/5 b/l hip flexion     LSO brace worn properly today. Removed in clinic     Gait: presents in wheelchair.     Midline Bony Tenderness: negative      Diagnostic Results:  I have personally reviewed imaging and agree with the findings.     Upright xray lumbar spine 2/18/25  Stable severe L1 burst compression fracture  No new acute fractures     Upright x-ray lumbar spine 01/14/2025   L1 burst compression fracture with worsening osseous retropulsion and height loss.      ASSESSMENT/PLAN:     Samantha Balderrama is a 89 y.o. female who presents for follow up of an L1 burst fracture sustained 3 months ago. She has been wearing her LSO brace as instructed since her last visit. Pain is nearly resolved. Ambulation stable. Xrays stable    Ok to d/c back brace  Activity precautions reviewed with patient and family   FU prn     Please feel free to call with any further questions        Poornima Concepcion PA-C  [facility]  Department of Neurosurgery  [phone]    Disclaimer: This note was dictated by speech recognition. Minor errors in transcription may be present.  Please call with any questions.

## 2025-03-24 DIAGNOSIS — I48.0 PAROXYSMAL ATRIAL FIBRILLATION: ICD-10-CM

## 2025-03-25 NOTE — TELEPHONE ENCOUNTER
No care due was identified.  Health [facility] Embedded Care Due Messages. Reference number: [id number].   3/25/2025 8:52:53 AM CDT
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
## 2025-04-02 ENCOUNTER — LAB VISIT (OUTPATIENT)
Dept: LAB | Facility: HOSPITAL | Age: OVER 89
End: 2025-04-02
Attending: STUDENT IN AN ORGANIZED HEALTH CARE EDUCATION/TRAINING PROGRAM
Payer: MEDICARE

## 2025-04-02 ENCOUNTER — OFFICE VISIT (OUTPATIENT)
Dept: INTERNAL MEDICINE | Facility: CLINIC | Age: OVER 89
End: 2025-04-02
Payer: MEDICARE

## 2025-04-02 VITALS
TEMPERATURE: 97 F | RESPIRATION RATE: 18 BRPM | WEIGHT: 132.94 LBS | BODY MASS INDEX: 26.8 KG/M2 | HEART RATE: 75 BPM | OXYGEN SATURATION: 98 % | HEIGHT: 59 IN | DIASTOLIC BLOOD PRESSURE: 82 MMHG | SYSTOLIC BLOOD PRESSURE: 120 MMHG

## 2025-04-02 DIAGNOSIS — Z00.00 PHYSICAL EXAM, ANNUAL: Primary | ICD-10-CM

## 2025-04-02 DIAGNOSIS — L98.9 SKIN LESION: ICD-10-CM

## 2025-04-02 DIAGNOSIS — I69.354 HEMIPLEGIA AND HEMIPARESIS FOLLOWING CEREBRAL INFARCTION AFFECTING LEFT NON-DOMINANT SIDE: ICD-10-CM

## 2025-04-02 DIAGNOSIS — Z91.81 AT HIGH RISK FOR INJURY RELATED TO FALL: ICD-10-CM

## 2025-04-02 DIAGNOSIS — E55.9 HYPOVITAMINOSIS D: Chronic | ICD-10-CM

## 2025-04-02 DIAGNOSIS — L89.102 PRESSURE INJURY OF BACK, STAGE 2: ICD-10-CM

## 2025-04-02 DIAGNOSIS — Z00.00 PHYSICAL EXAM, ANNUAL: ICD-10-CM

## 2025-04-02 DIAGNOSIS — R53.83 FATIGUE, UNSPECIFIED TYPE: ICD-10-CM

## 2025-04-02 DIAGNOSIS — E78.2 MIXED HYPERLIPIDEMIA: ICD-10-CM

## 2025-04-02 DIAGNOSIS — I10 HYPERTENSION, UNSPECIFIED TYPE: ICD-10-CM

## 2025-04-02 DIAGNOSIS — C34.90 ADENOCARCINOMA OF LUNG, UNSPECIFIED LATERALITY: ICD-10-CM

## 2025-04-02 DIAGNOSIS — R73.01 ABNORMAL FASTING GLUCOSE: ICD-10-CM

## 2025-04-02 DIAGNOSIS — I48.0 PAROXYSMAL ATRIAL FIBRILLATION: ICD-10-CM

## 2025-04-02 LAB
ALBUMIN SERPL BCP-MCNC: 3.8 G/DL (ref 3.5–5.2)
ALP SERPL-CCNC: 72 UNIT/L (ref 40–150)
ALT SERPL W/O P-5'-P-CCNC: 8 UNIT/L (ref 10–44)
ANION GAP (OHS): 8 MMOL/L (ref 8–16)
AST SERPL-CCNC: 14 UNIT/L (ref 11–45)
BILIRUB SERPL-MCNC: 0.9 MG/DL (ref 0.1–1)
BUN SERPL-MCNC: 12 MG/DL (ref 10–30)
CALCIUM SERPL-MCNC: 9.4 MG/DL (ref 8.7–10.5)
CHLORIDE SERPL-SCNC: 107 MMOL/L (ref 95–110)
CHOLEST SERPL-MCNC: 201 MG/DL (ref 120–199)
CHOLEST/HDLC SERPL: 3.9 {RATIO} (ref 2–5)
CO2 SERPL-SCNC: 25 MMOL/L (ref 23–29)
CREAT SERPL-MCNC: 0.6 MG/DL (ref 0.5–1.4)
GFR SERPLBLD CREATININE-BSD FMLA CKD-EPI: >60 ML/MIN/1.73/M2
GLUCOSE SERPL-MCNC: 84 MG/DL (ref 70–110)
HDLC SERPL-MCNC: 51 MG/DL (ref 40–75)
HDLC SERPL: 25.4 % (ref 20–50)
LDLC SERPL CALC-MCNC: 123.2 MG/DL (ref 63–159)
NONHDLC SERPL-MCNC: 150 MG/DL
POTASSIUM SERPL-SCNC: 4 MMOL/L (ref 3.5–5.1)
PROT SERPL-MCNC: 7.9 GM/DL (ref 6–8.4)
SODIUM SERPL-SCNC: 140 MMOL/L (ref 136–145)
TRIGL SERPL-MCNC: 134 MG/DL (ref 30–150)

## 2025-04-02 PROCEDURE — 82306 VITAMIN D 25 HYDROXY: CPT | Mod: HCNC

## 2025-04-02 PROCEDURE — 80061 LIPID PANEL: CPT | Mod: HCNC

## 2025-04-02 PROCEDURE — 80053 COMPREHEN METABOLIC PANEL: CPT | Mod: HCNC

## 2025-04-02 PROCEDURE — 1157F ADVNC CARE PLAN IN RCRD: CPT | Mod: HCNC,CPTII,S$GLB, | Performed by: STUDENT IN AN ORGANIZED HEALTH CARE EDUCATION/TRAINING PROGRAM

## 2025-04-02 PROCEDURE — 3288F FALL RISK ASSESSMENT DOCD: CPT | Mod: HCNC,CPTII,S$GLB, | Performed by: STUDENT IN AN ORGANIZED HEALTH CARE EDUCATION/TRAINING PROGRAM

## 2025-04-02 PROCEDURE — 83036 HEMOGLOBIN GLYCOSYLATED A1C: CPT | Mod: HCNC

## 2025-04-02 PROCEDURE — 1159F MED LIST DOCD IN RCRD: CPT | Mod: HCNC,CPTII,S$GLB, | Performed by: STUDENT IN AN ORGANIZED HEALTH CARE EDUCATION/TRAINING PROGRAM

## 2025-04-02 PROCEDURE — 1126F AMNT PAIN NOTED NONE PRSNT: CPT | Mod: HCNC,CPTII,S$GLB, | Performed by: STUDENT IN AN ORGANIZED HEALTH CARE EDUCATION/TRAINING PROGRAM

## 2025-04-02 PROCEDURE — 84436 ASSAY OF TOTAL THYROXINE: CPT | Mod: HCNC

## 2025-04-02 PROCEDURE — 84443 ASSAY THYROID STIM HORMONE: CPT | Mod: HCNC

## 2025-04-02 PROCEDURE — 36415 COLL VENOUS BLD VENIPUNCTURE: CPT | Mod: HCNC,PO

## 2025-04-02 PROCEDURE — 1101F PT FALLS ASSESS-DOCD LE1/YR: CPT | Mod: HCNC,CPTII,S$GLB, | Performed by: STUDENT IN AN ORGANIZED HEALTH CARE EDUCATION/TRAINING PROGRAM

## 2025-04-02 PROCEDURE — 99397 PER PM REEVAL EST PAT 65+ YR: CPT | Mod: HCNC,S$GLB,, | Performed by: STUDENT IN AN ORGANIZED HEALTH CARE EDUCATION/TRAINING PROGRAM

## 2025-04-02 PROCEDURE — 84439 ASSAY OF FREE THYROXINE: CPT | Mod: HCNC

## 2025-04-02 PROCEDURE — 99999 PR PBB SHADOW E&M-EST. PATIENT-LVL IV: CPT | Mod: PBBFAC,HCNC,, | Performed by: STUDENT IN AN ORGANIZED HEALTH CARE EDUCATION/TRAINING PROGRAM

## 2025-04-02 RX ORDER — METOPROLOL SUCCINATE 25 MG/1
25 TABLET, EXTENDED RELEASE ORAL 2 TIMES DAILY
Qty: 180 TABLET | Refills: 3 | Status: SHIPPED | OUTPATIENT
Start: 2025-04-02

## 2025-04-02 RX ORDER — ERGOCALCIFEROL 1.25 MG/1
50000 CAPSULE ORAL
Qty: 12 CAPSULE | Refills: 3 | Status: SHIPPED | OUTPATIENT
Start: 2025-04-02

## 2025-04-02 NOTE — PROGRESS NOTES
Subjective:    The following note was written in combination with deep-scribe software and dictation (to which understanding and consent was verbally expressed); please excuse any transcription and/or dictation errors.      Chief Complaint: Annual Exam and raised bump (Left thumb)    HPI  Ms. Samantha Balderrama is a 89-year-old with history of right MCA embolic CVA, a number of ophthalmological diagnoses, history of both squamous cell carcinoma and basal cell carcinoma, paroxysmal AFib, hyperlipidemia, aortic atherosclerosis, history of bilateral breast cancer with secondary lung cancer, hypovitaminosis D, fatty liver disease, diffuse osteoarthritis in setting of osteoporosis, seasonal allergies, and obstructive sleep apnea presenting for annual physical:    Fall risk:  - will be professionally elevated given history of CVA (with persistent neurologic dysfunction), chronic anticoagulation, and general fragility given age.    - not opposed to physical therapy, but difficulty with transportation and prior adequacy of home health PT as led to recurrent home health preference.      Requesting evaluation of skin lesion (base of right thumb):  - noted over the past few weeks in slowly growing     Family, social, surgical Hx reviewed     Health Maintenance:  Due for annual screening labs and routine vaccinations (RSV/COVID/Shingrix)    Past Medical History:   Diagnosis Date    (HFpEF) heart failure with preserved ejection fraction 1/6/2022    Allergy     Basal cell carcinoma 2003    Dr. 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 SAQIB Nielsen/ right side of nose and forhead also.    Stroke 2/2/2021    Subclinical hypothyroidism  3/29/2024     Past Surgical History:   Procedure Laterality Date    BREAST SURGERY Right 1990    x2- Mastectomy    BREAST SURGERY Left     Mastectomy    CATARACT EXTRACTION Bilateral 2012    Dr. Crocker    EYE SURGERY      MASTECTOMY, RADICAL Left 1988     Family History   Problem Relation Name Age of Onset    Heart failure Mother      Hypertension Mother      Heart disease Mother      Heart failure Father      Heart disease Father      Heart failure Brother      Hypertension Brother      Heart disease Brother      Hypertension Sister      Hypertension Sister      Heart disease Sister      Cancer Maternal Aunt      Hypertension Maternal Uncle      Hypertension Paternal Aunt      Hypertension Paternal Uncle      Asthma Cousin      Cancer Cousin      Amblyopia Neg Hx      Blindness Neg Hx      Cataracts Neg Hx      Diabetes Neg Hx      Glaucoma Neg Hx      Macular degeneration Neg Hx      Retinal detachment Neg Hx      Strabismus Neg Hx      Stroke Neg Hx      Thyroid disease Neg Hx      Melanoma Neg Hx      Psoriasis Neg Hx      Lupus Neg Hx       Social History[1]  Review of patient's allergies indicates:  No Known Allergies  Samantha Balderrama had no medications administered during this visit.     Review of Systems   Constitutional:  Negative for appetite change, chills and fever.   HENT: Negative.     Respiratory:  Negative for cough, chest tightness and shortness of breath.    Cardiovascular:  Negative for chest pain, palpitations and leg swelling.   Gastrointestinal:  Negative for abdominal distention, abdominal pain, blood in stool, constipation, diarrhea, nausea and vomiting.   Endocrine: Negative.    Genitourinary:  Negative for difficulty urinating, dysuria, frequency and hematuria.   Musculoskeletal: Negative.    Integumentary:  Negative.   Neurological: Negative.    Psychiatric/Behavioral: Negative.           Objective:      Vitals:    04/02/25 1506   BP: 120/82   Pulse: 75   Resp: 18   Temp: 97.2 °F (36.2  °C)      Physical Exam  Vitals reviewed.   Constitutional:       General: She is not in acute distress.     Appearance: Normal appearance.   HENT:      Head: Normocephalic and atraumatic.      Comments: Facial features are symmetric      Nose: Nose normal. No congestion or rhinorrhea.      Mouth/Throat:      Mouth: Mucous membranes are moist.      Pharynx: Oropharynx is clear. No oropharyngeal exudate or posterior oropharyngeal erythema.   Eyes:      General: No scleral icterus.     Extraocular Movements: Extraocular movements intact.      Conjunctiva/sclera: Conjunctivae normal.   Cardiovascular:      Rate and Rhythm: Normal rate and regular rhythm.      Pulses: Normal pulses.      Heart sounds: Normal heart sounds.   Pulmonary:      Effort: Pulmonary effort is normal. No respiratory distress.      Breath sounds: Normal breath sounds.   Musculoskeletal:         General: No deformity or signs of injury. Normal range of motion.      Cervical back: Normal range of motion.   Skin:     General: Skin is warm and dry.      Findings: No rash.   Neurological:      General: No focal deficit present.      Mental Status: She is alert and oriented to person, place, and time. Mental status is at baseline.   Psychiatric:         Mood and Affect: Mood normal.         Behavior: Behavior normal.         Thought Content: Thought content normal.       Medications Ordered Prior to Encounter[2]      Assessment:       1. Physical exam, annual    2. Adenocarcinoma of lung, unspecified laterality    3. Pressure injury of back, stage 2    4. Paroxysmal atrial fibrillation    5. Hemiplegia and hemiparesis following cerebral infarction affecting left non-dominant side    6. Skin lesion    7. Hypertension, unspecified type    8. Abnormal fasting glucose    9. Mixed hyperlipidemia    10. Hypovitaminosis D    11. Fatigue, unspecified type    12. At high risk for injury related to fall        Plan:       Physical exam, annual  -     T4, Free;  Future; Expected date: 04/02/2025  -     CBC Auto Differential; Future; Expected date: 04/02/2025  -     Comprehensive Metabolic Panel; Future; Expected date: 04/02/2025  -     Hemoglobin A1C; Future; Expected date: 04/02/2025  -     Lipid Panel; Future; Expected date: 04/02/2025  -     T4; Future; Expected date: 04/02/2025  -     TSH; Future; Expected date: 04/02/2025  -     Vitamin D; Future; Expected date: 04/02/2025   - annual screening labs ordered    Adenocarcinoma of lung, unspecified laterality   - history noted    Pressure injury of back, stage 2   - resolved    Paroxysmal atrial fibrillation  -     metoprolol succinate (TOPROL-XL) 25 MG 24 hr tablet; Take 1 tablet (25 mg total) by mouth 2 (two) times daily.  Dispense: 180 tablet; Refill: 3    Hemiplegia and hemiparesis following cerebral infarction affecting left non-dominant side   - will facilitate home health physical therapy as below     Skin lesion  -     Ambulatory referral/consult to Dermatology; Future; Expected date: 04/09/2025    Hypertension, unspecified type  -     CBC Auto Differential; Future; Expected date: 04/02/2025  -     Comprehensive Metabolic Panel; Future; Expected date: 04/02/2025  -     T4; Future; Expected date: 04/02/2025  -     TSH; Future; Expected date: 04/02/2025    Abnormal fasting glucose  -     Hemoglobin A1C; Future; Expected date: 04/02/2025    Mixed hyperlipidemia  -     Lipid Panel; Future; Expected date: 04/02/2025    Hypovitaminosis D  -     Vitamin D; Future; Expected date: 04/02/2025    Fatigue, unspecified type  -     T4, Free; Future; Expected date: 04/02/2025    At high risk for injury related to fall  -     Ambulatory referral/consult to Home Health; Future; Expected date: 04/03/2025    Other orders  -     ergocalciferol (ERGOCALCIFEROL) 50,000 unit Cap; Take 1 capsule (50,000 Units total) by mouth every 7 days.  Dispense: 12 capsule; Refill: 3                  [1]   Social History  Socioeconomic History     Marital status: Single    Years of education: 12 + 4 + 2    Highest education level: Master's degree (e.g., MA, MS, Patti, MEd, MSW, ANT)   Occupational History    Occupation:  Retired in 1980     Employer: [employer]   Tobacco Use    Smoking status: Never    Smokeless tobacco: Never   Substance and Sexual Activity    Alcohol use: Yes     Alcohol/week: 1.0 standard drink of alcohol     Types: 1 Shots of liquor per week     Comment: socially    Drug use: Never    Sexual activity: Not Currently   Social History Narrative    Lives in her own condominium by herself.     Social Drivers of Health     Financial Resource Strain: Low Risk  (12/3/2024)    Overall Financial Resource Strain (CARDIA)     Difficulty of Paying Living Expenses: Not hard at all   Food Insecurity: No Food Insecurity (12/3/2024)    Hunger Vital Sign     Worried About Running Out of Food in the Last Year: Never true     Ran Out of Food in the Last Year: Never true   Transportation Needs: No Transportation Needs (11/13/2018)    PRAPARE - Transportation     Lack of Transportation (Medical): No     Lack of Transportation (Non-Medical): No   Physical Activity: Unknown (12/3/2024)    Exercise Vital Sign     Days of Exercise per Week: 0 days   Stress: Stress Concern Present (12/3/2024)    Citizen of [place] [employer] - Occupational Stress Questionnaire     Feeling of Stress : To some extent   Housing Stability: Unknown (12/3/2024)    Housing Stability Vital Sign     Unable to Pay for Housing in the Last Year: No   [2]   Current Outpatient Medications on File Prior to Visit   Medication Sig Dispense Refill    apixaban (ELIQUIS) 5 mg Tab Take 1 tablet (5 mg total) by mouth 2 (two) times daily. 60 tablet 0    benzonatate (TESSALON) 200 MG capsule TAKE 1 CAPSULE (200 MG TOTAL) BY MOUTH 3 (THREE) TIMES DAILY AS NEEDED FOR COUGH. 30 capsule 0    loratadine (CLARITIN) 10 mg tablet Take 1 tablet (10 mg total) by mouth daily as needed  for Allergies. 30 tablet 0    salt moisturizing solution no1 (OCEAN ULTRA SALINE MIST) Mist 2 Pump by Nasal route 2 (two) times daily as needed. 90 mL 0    [DISCONTINUED] metoprolol succinate (TOPROL-XL) 25 MG 24 hr tablet Take 1 tablet (25 mg total) by mouth 2 (two) times daily. 180 tablet 1    [DISCONTINUED] atorvastatin (LIPITOR) 40 MG tablet TAKE 1 TABLET (40 MG TOTAL) BY MOUTH ONCE DAILY. (Patient not taking: Reported on 3/26/2024) 90 tablet 3    [DISCONTINUED] dexamethasone (DECADRON) 10 mg/mL injection  (Patient not taking: Reported on 4/2/2025)      [DISCONTINUED] ergocalciferol (ERGOCALCIFEROL) 50,000 unit Cap Take 1 capsule (50,000 Units total) by mouth every 7 days. (Patient not taking: Reported on 4/2/2025) 12 capsule 3    [DISCONTINUED] LIDOcaine (LIDODERM) 5 % Place 1 patch onto the skin once daily. Remove & Discard patch within 12 hours or as directed by MD (Patient not taking: Reported on 4/2/2025) 14 patch 0    [DISCONTINUED] mupirocin (BACTROBAN) 2 % ointment Apply topically once daily. (Patient not taking: Reported on 4/2/2025) 15 g 0    [DISCONTINUED] tamoxifen (NOLVADEX) 10 MG Tab tamoxifen Take 1 time per day No date recorded tablet 1 time per day No route recorded No set duration recorded No set duration amount recorded active 10 mg (Patient not taking: Reported on 4/2/2025)      [DISCONTINUED] VIT A/VIT C/VIT E/ZINC/COPPER (PRESERVISION AREDS ORAL) Take by mouth once daily.  (Patient not taking: Reported on 4/2/2025)       No current facility-administered medications on file prior to visit.

## 2025-04-03 ENCOUNTER — RESULTS FOLLOW-UP (OUTPATIENT)
Dept: INTERNAL MEDICINE | Facility: CLINIC | Age: OVER 89
End: 2025-04-03

## 2025-04-03 LAB
25(OH)D3+25(OH)D2 SERPL-MCNC: 12 NG/ML (ref 30–96)
EAG (OHS): 100 MG/DL (ref 68–131)
HBA1C MFR BLD: 5.1 % (ref 4–5.6)
T4 FREE SERPL-MCNC: 0.93 NG/DL (ref 0.71–1.51)
T4 SERPL-MCNC: 6 UG/DL (ref 4.5–11.5)
TSH SERPL-ACNC: 3.6 UIU/ML (ref 0.4–4)

## 2025-04-08 DIAGNOSIS — I48.0 PAROXYSMAL ATRIAL FIBRILLATION: ICD-10-CM

## 2025-04-08 RX ORDER — METOPROLOL SUCCINATE 25 MG/1
TABLET, EXTENDED RELEASE ORAL
Qty: 180 TABLET | Refills: 0 | OUTPATIENT
Start: 2025-04-08

## 2025-04-08 NOTE — TELEPHONE ENCOUNTER
Care Due:                  Date            Visit Type   Department     Provider  --------------------------------------------------------------------------------                                EP -                              PRIMARY      MET INTERNAL  Last Visit: 04-      CARE ([facility])   MEDICINE       Grover Bardales  Next Visit: None Scheduled  None         None Found                                                            Last  Test          Frequency    Reason                     Performed    Due Date  --------------------------------------------------------------------------------    CBC.........  12 months..  apixaban.................  10-   10-    [facility] Embedded Care Due Messages. Reference number: [id number].   4/08/2025 2:06:06 PM CDT

## 2025-04-08 NOTE — TELEPHONE ENCOUNTER
Refill Decision Note   Samantha Balderrama  is requesting a refill authorization.  Brief Assessment and Rationale for Refill:  Quick Discontinue     Medication Therapy Plan:    Pharmacy is requesting new scripts for the following medications without required information, (sig/ frequency/qty/etc)      Medication Reconciliation Completed: No     Comments: Pharmacies have been requesting medications for patients without required information, (sig, frequency, qty, etc.). In addition, requests are sent for medication(s) pt. are currently not taking, and medications patients have never taken.    We have spoken to the pharmacies about these request types and advised their teams previously that we are unable to assess these New Script requests and require all details for these requests. This is a known issue and has been reported.     Note composed:3:00 PM 04/08/2025

## 2025-04-10 ENCOUNTER — OFFICE VISIT (OUTPATIENT)
Dept: SLEEP MEDICINE | Facility: CLINIC | Age: OVER 89
End: 2025-04-10
Attending: PSYCHIATRY & NEUROLOGY
Payer: MEDICARE

## 2025-04-10 VITALS
WEIGHT: 132.19 LBS | HEART RATE: 80 BPM | DIASTOLIC BLOOD PRESSURE: 73 MMHG | HEIGHT: 59 IN | SYSTOLIC BLOOD PRESSURE: 159 MMHG | BODY MASS INDEX: 26.65 KG/M2

## 2025-04-10 DIAGNOSIS — G47.33 OSA (OBSTRUCTIVE SLEEP APNEA): Primary | ICD-10-CM

## 2025-04-10 DIAGNOSIS — G47.30 SLEEP APNEA TREATED WITH CONTINUOUS POSITIVE AIRWAY PRESSURE (CPAP): ICD-10-CM

## 2025-04-10 PROCEDURE — 3288F FALL RISK ASSESSMENT DOCD: CPT | Mod: HCNC,CPTII,S$GLB, | Performed by: NURSE PRACTITIONER

## 2025-04-10 PROCEDURE — 1157F ADVNC CARE PLAN IN RCRD: CPT | Mod: HCNC,CPTII,S$GLB, | Performed by: NURSE PRACTITIONER

## 2025-04-10 PROCEDURE — 99999 PR PBB SHADOW E&M-EST. PATIENT-LVL III: CPT | Mod: PBBFAC,HCNC,, | Performed by: NURSE PRACTITIONER

## 2025-04-10 PROCEDURE — 1126F AMNT PAIN NOTED NONE PRSNT: CPT | Mod: HCNC,CPTII,S$GLB, | Performed by: NURSE PRACTITIONER

## 2025-04-10 PROCEDURE — 1101F PT FALLS ASSESS-DOCD LE1/YR: CPT | Mod: HCNC,CPTII,S$GLB, | Performed by: NURSE PRACTITIONER

## 2025-04-10 PROCEDURE — 99214 OFFICE O/P EST MOD 30 MIN: CPT | Mod: S$GLB,,, | Performed by: NURSE PRACTITIONER

## 2025-04-10 NOTE — PROGRESS NOTES
"Cc: BASILIO, last seen 3/2023    02/09/2025 - 04/09/2025 Usage days 60/60 days (100%)   >= 4 hours 57 days (95%)    Average usage (days used) 7 hours 48 minutes    95th percentile: 10.6cm  Leaks - L/[id number] percentile: 52.4  AHI: 8.  She remains adherent with apap 8-12cm(got in 2022).wonders about alternative therapies. Using dreamwear mask. Not aware of mask leaks  W/PAP snoring resolved and sleep more consolidated.       BP (!) 159/73 (BP Location: Left arm, Patient Position: Sitting)   Pulse 80   Ht 4' 11" (1.499 m)   Wt 59.9 kg (132 lb 2.7 oz)   BMI 26.69 kg/m²     PSG (133#) [facility] AHI 29/low sat 82%  2005 Titration cpap 10cm    Assessment:  BASILIO, moderate. Adherent with PAP, benefits from therapy. AHI<5    Plan:  Adjust today to APAP 8-14cm. Notify me please 1 mos to review data again, consider cpap titration study.  DME [facility] supplies          "

## 2025-06-06 DIAGNOSIS — I48.0 PAROXYSMAL ATRIAL FIBRILLATION: ICD-10-CM

## 2025-06-06 NOTE — TELEPHONE ENCOUNTER
No care due was identified.  [facility] Embedded Care Due Messages. Reference number: [id number].   6/06/2025 11:56:38 AM CDT

## 2025-06-07 NOTE — TELEPHONE ENCOUNTER
Refill Routing Note   Medication(s) are not appropriate for processing by [facility] for the following reason(s):        Outside of protocol    [facility] action(s):  Route             Appointments  past 12m or future 3m with PCP    Date Provider   Last Visit   4/2/2025 Grover Bardales MD   Next Visit   Visit date not found Grover Bardales MD   ED visits in past 90 days: 0        Note composed:7:11 PM 06/06/2025

## 2025-06-09 RX ORDER — APIXABAN 5 MG/1
5 TABLET, FILM COATED ORAL 2 TIMES DAILY
Qty: 60 TABLET | Refills: 1 | Status: SHIPPED | OUTPATIENT
Start: 2025-06-09

## 2025-06-18 ENCOUNTER — TELEPHONE (OUTPATIENT)
Dept: INTERNAL MEDICINE | Facility: CLINIC | Age: OVER 89
End: 2025-06-18
Payer: MEDICARE

## 2025-06-18 DIAGNOSIS — J98.01 COUGH DUE TO BRONCHOSPASM: ICD-10-CM

## 2025-06-18 RX ORDER — BENZONATATE 200 MG/1
200 CAPSULE ORAL 3 TIMES DAILY PRN
Qty: 30 CAPSULE | Refills: 1 | Status: SHIPPED | OUTPATIENT
Start: 2025-06-18

## 2025-06-18 NOTE — TELEPHONE ENCOUNTER
Refill Routing Note   Medication(s) are not appropriate for processing by [facility] for the following reason(s):        Outside of protocol    [facility] action(s):  Route             Appointments  past 12m or future 3m with PCP    Date Provider   Last Visit   4/2/2025 Grover Bardales MD   Next Visit   6/18/2025 Grover Bardales MD   ED visits in past 90 days: 0        Note composed:2:55 PM 06/18/2025

## 2025-06-18 NOTE — TELEPHONE ENCOUNTER
Copied from CRM #[id number]. Topic: Medications - Medication Refill  >> Jun 18, 2025  1:46 PM Robert wrote:  Requesting an RX refill or new RX.    Is this a refill or new RX: refill    RX name and strength (copy/paste from chart):  benzonatate (TESSALON) 200 MG capsule   Is this a 30 day or 90 day RX: 30    Pharmacy name and phone # (copy/paste from chart):    [facility] ([place]) - [place] [address] - [address] [place] [address]  [address] [place] [address]  [place] [address]  Phone: [phone] Fax: [phone]    Who called and call back number:Samantha Balderrama [phone]    The doctors have asked that we provide their patients with the following 2 reminders -- prescription refills can take up to 72 hours, and a friendly reminder that in the future you can use your [facility] account to request refills:

## 2025-06-18 NOTE — TELEPHONE ENCOUNTER
You last filled 30 pills 11/29/24  Patient calling for refill of tessalon.    Lov 4/2/25 annual.  Left msg to call us with more information  Why she needs this refilled.

## 2025-07-30 ENCOUNTER — TELEPHONE (OUTPATIENT)
Dept: DERMATOLOGY | Facility: CLINIC | Age: OVER 89
End: 2025-07-30
Payer: MEDICARE

## 2025-07-31 ENCOUNTER — TELEPHONE (OUTPATIENT)
Dept: DERMATOLOGY | Facility: CLINIC | Age: OVER 89
End: 2025-07-31
Payer: MEDICARE

## 2025-07-31 ENCOUNTER — OFFICE VISIT (OUTPATIENT)
Dept: DERMATOLOGY | Facility: CLINIC | Age: OVER 89
End: 2025-07-31
Payer: MEDICARE

## 2025-07-31 DIAGNOSIS — D48.5 NEOPLASM OF UNCERTAIN BEHAVIOR OF SKIN: Primary | ICD-10-CM

## 2025-07-31 DIAGNOSIS — B35.4 TINEA CORPORIS: ICD-10-CM

## 2025-07-31 PROCEDURE — 11102 TANGNTL BX SKIN SINGLE LES: CPT | Mod: HCNC,S$GLB,, | Performed by: DERMATOLOGY

## 2025-07-31 PROCEDURE — 11103 TANGNTL BX SKIN EA SEP/ADDL: CPT | Mod: HCNC,S$GLB,, | Performed by: DERMATOLOGY

## 2025-07-31 PROCEDURE — 99999 PR PBB SHADOW E&M-EST. PATIENT-LVL III: CPT | Mod: PBBFAC,HCNC,, | Performed by: DERMATOLOGY

## 2025-07-31 PROCEDURE — 99499 UNLISTED E&M SERVICE: CPT | Mod: HCNC,S$GLB,, | Performed by: DERMATOLOGY

## 2025-07-31 NOTE — PROGRESS NOTES
Subjective:      Patient ID:  Samantha Balderrama is a 89 y.o. female who presents for   Chief Complaint   Patient presents with    Lesion     L hand      Hx of BCC of the right cheek (s/p Mohs by Dr. Waterman on 2013)- H/o SCC r nose and forehead Dr. Nielsen     History of Present Illness: The patient presents with chief complaint of lesion.  Location:  L hand   Duration: 6 mos   Signs/Symptoms: none    Prior treatments: none           Review of Systems   Skin:  Negative for itching, rash, dry skin, daily sunscreen use, activity-related sunscreen use and wears hat.   Hematologic/Lymphatic: Bruises/bleeds easily (on eliqus).       Objective:   Physical Exam   Constitutional: She appears well-developed and well-nourished. No distress.   Neurological: She is alert and oriented to person, place, and time. She is not disoriented.   Psychiatric: She has a normal mood and affect.   Skin:   Areas Examined (abnormalities noted in diagram):   Head / Face Inspection Performed  LLE Inspection Performed  Nails and Digits Inspection Performed            Diagram Legend     Erythematous scaling macule/papule c/w actinic keratosis       Vascular papule c/w angioma      Pigmented verrucoid papule/plaque c/w seborrheic keratosis      Yellow umbilicated papule c/w sebaceous hyperplasia      Irregularly shaped tan macule c/w lentigo     1-2 mm smooth white papules consistent with Milia      Movable subcutaneous cyst with punctum c/w epidermal inclusion cyst      Subcutaneous movable cyst c/w pilar cyst      Firm pink to brown papule c/w dermatofibroma      Pedunculated fleshy papule(s) c/w skin tag(s)      Evenly pigmented macule c/w junctional nevus     Mildly variegated pigmented, slightly irregular-bordered macule c/w mildly atypical nevus      Flesh colored to evenly pigmented papule c/w intradermal nevus       Pink pearly papule/plaque c/w basal cell carcinoma      Erythematous hyperkeratotic cursted plaque c/w SCC      Surgical  scar with no sign of skin cancer recurrence      Open and closed comedones      Inflammatory papules and pustules      Verrucoid papule consistent consistent with wart     Erythematous eczematous patches and plaques     Dystrophic onycholytic nail with subungual debris c/w onychomycosis     Umbilicated papule    Erythematous-base heme-crusted tan verrucoid plaque consistent with inflamed seborrheic keratosis     Erythematous Silvery Scaling Plaque c/w Psoriasis     See annotation                    Assessment / Plan:      Pathology Orders:       Normal Orders This Visit    Specimen to Pathology, Dermatology     Questions:    Procedure Type: Dermatology and skin neoplasms    Number of Specimens: 2    ------------------------: -------------------------    Spec 1 Procedure: Shave Biopsy    Spec 1 Clinical Impression: r/o bcc    Spec 1 Source: left nl fold    ------------------------: -------------------------    Spec 2 Procedure: Shave Biopsy    Spec 2 Clinical Impression: cut horn, r/o underlying malignancy    Spec 2 Source: left hand    Clinical Information: see EPIC    Clinical History: see EPIC    Specimen Source: Skin    Release to patient: Immediate    Send normal result to authorizing provider's In Basket if patient is active on MyChart: Yes          Neoplasm of uncertain behavior of skin  -     Ambulatory referral/consult to Dermatology  -     Specimen to Pathology, Dermatology    Shave biopsy procedure note:    Shave biopsy x 2 performed after verbal consent including risk of infection, scar, recurrence, need for additional treatment of site. Area prepped with alcohol, anesthetized with approximately 1.0cc of 1% lidocaine with epinephrine. Lesional tissue shaved with razor blade. Hemostasis achieved with application of aluminum chloride followed by hyfrecation. No complications. Dressing applied. Wound care explained.    Saucerized face. Pt does not want surgery    Tinea corporis  KOH +  Lamisil cream or  Lotrimin ultra cream to affected area and at least 1 inch around affected area 2 times/day for 1 month.               No follow-ups on file.

## 2025-07-31 NOTE — PATIENT INSTRUCTIONS
Shave Biopsy Wound Care    Your doctor has performed a shave biopsy today.  A band aid and vaseline ointment has been placed over the site.  This should remain in place for NO LONGER THAN 48 hours.  It is fine to remove the bandaid after 24 hours, if the area is no longer bleeding. It is recommended that you keep the area dry (do not wet)) for the first 24 hours.  After 24 hours, wash the area with warm soap and water and apply Vaseline jelly.  Many patients prefer to use Neosporin or Bacitracin ointment.  This is acceptable; however, know that you can develop an allergy to this medication even if you have used it safely for years.  It is important to keep the area moist.  Letting it dry out and get air slows healing time, and will worsen the scar.        If you notice increasing redness, tenderness, pain, or yellow drainage at the biopsy site, please notify your doctor.  These are signs of an infection.    If your biopsy site is bleeding, apply firm pressure for 15 minutes straight.  Repeat for another 15 minutes, if it is still bleeding.   If the surgical site continues to bleed, then please contact your doctor.      For [facility] users:   You will receive your biopsy results in [facility] as soon as they are available. Please be assured that your physician/provider will review your results and will then determine what further treatment, evaluation, or planning is required. You should be contacted by your physician's/provider's office within 5 business days of receiving your results; If not, please reach out to directly. This is one more way [employer] is putting you first.     [facility] [place] [address]/ [phone] FAX/ [email or web address]       For leg:   Lamisil cream or Lotrimin ultra cream to affected area and at least 1 inch around affected area 2 times/day for 1 month.

## 2025-08-19 DIAGNOSIS — J98.01 COUGH DUE TO BRONCHOSPASM: ICD-10-CM

## 2025-08-19 DIAGNOSIS — I48.0 PAROXYSMAL ATRIAL FIBRILLATION: ICD-10-CM

## 2025-08-20 RX ORDER — APIXABAN 5 MG/1
5 TABLET, FILM COATED ORAL 2 TIMES DAILY
Qty: 180 TABLET | Refills: 2 | Status: SHIPPED | OUTPATIENT
Start: 2025-08-20

## 2025-08-20 RX ORDER — BENZONATATE 200 MG/1
CAPSULE ORAL
Qty: 30 CAPSULE | Refills: 1 | Status: SHIPPED | OUTPATIENT
Start: 2025-08-20